# Patient Record
Sex: MALE | Race: WHITE | HISPANIC OR LATINO | URBAN - METROPOLITAN AREA
[De-identification: names, ages, dates, MRNs, and addresses within clinical notes are randomized per-mention and may not be internally consistent; named-entity substitution may affect disease eponyms.]

---

## 2019-07-18 ENCOUNTER — INPATIENT (INPATIENT)
Facility: HOSPITAL | Age: 64
LOS: 4 days | Discharge: HOME | DRG: 897 | End: 2019-07-23
Attending: FAMILY MEDICINE | Admitting: FAMILY MEDICINE
Payer: MEDICAID

## 2019-07-18 VITALS
WEIGHT: 145.06 LBS | HEART RATE: 107 BPM | OXYGEN SATURATION: 96 % | HEIGHT: 60 IN | SYSTOLIC BLOOD PRESSURE: 145 MMHG | RESPIRATION RATE: 16 BRPM | TEMPERATURE: 98 F | DIASTOLIC BLOOD PRESSURE: 87 MMHG

## 2019-07-18 DIAGNOSIS — K29.20 ALCOHOLIC GASTRITIS WITHOUT BLEEDING: ICD-10-CM

## 2019-07-18 DIAGNOSIS — F10.10 ALCOHOL ABUSE, UNCOMPLICATED: ICD-10-CM

## 2019-07-18 DIAGNOSIS — R10.84 GENERALIZED ABDOMINAL PAIN: ICD-10-CM

## 2019-07-18 DIAGNOSIS — E11.9 TYPE 2 DIABETES MELLITUS WITHOUT COMPLICATIONS: ICD-10-CM

## 2019-07-18 DIAGNOSIS — I10 ESSENTIAL (PRIMARY) HYPERTENSION: ICD-10-CM

## 2019-07-18 DIAGNOSIS — Z29.9 ENCOUNTER FOR PROPHYLACTIC MEASURES, UNSPECIFIED: ICD-10-CM

## 2019-07-18 LAB
ALBUMIN SERPL ELPH-MCNC: 4.2 G/DL — SIGNIFICANT CHANGE UP (ref 3.5–5)
ALP SERPL-CCNC: 113 U/L — SIGNIFICANT CHANGE UP (ref 40–120)
ALT FLD-CCNC: 146 U/L DA — HIGH (ref 10–60)
ANION GAP SERPL CALC-SCNC: 20 MMOL/L — HIGH (ref 5–17)
AST SERPL-CCNC: 232 U/L — HIGH (ref 10–40)
BASE EXCESS BLDA CALC-SCNC: -6.3 MMOL/L — LOW (ref -2–2)
BASOPHILS # BLD AUTO: 0.02 K/UL — SIGNIFICANT CHANGE UP (ref 0–0.2)
BASOPHILS NFR BLD AUTO: 0.4 % — SIGNIFICANT CHANGE UP (ref 0–2)
BILIRUB SERPL-MCNC: 1.6 MG/DL — HIGH (ref 0.2–1.2)
BLOOD GAS COMMENTS ARTERIAL: SIGNIFICANT CHANGE UP
BUN SERPL-MCNC: 17 MG/DL — SIGNIFICANT CHANGE UP (ref 7–18)
CALCIUM SERPL-MCNC: 8.6 MG/DL — SIGNIFICANT CHANGE UP (ref 8.4–10.5)
CHLORIDE SERPL-SCNC: 96 MMOL/L — SIGNIFICANT CHANGE UP (ref 96–108)
CO2 SERPL-SCNC: 17 MMOL/L — LOW (ref 22–31)
CREAT SERPL-MCNC: 0.96 MG/DL — SIGNIFICANT CHANGE UP (ref 0.5–1.3)
EOSINOPHIL # BLD AUTO: 0.06 K/UL — SIGNIFICANT CHANGE UP (ref 0–0.5)
EOSINOPHIL NFR BLD AUTO: 1.1 % — SIGNIFICANT CHANGE UP (ref 0–6)
ETHANOL SERPL-MCNC: 204 MG/DL — HIGH (ref 0–10)
GLUCOSE SERPL-MCNC: 168 MG/DL — HIGH (ref 70–99)
HCO3 BLDA-SCNC: 18 MMOL/L — LOW (ref 23–27)
HCT VFR BLD CALC: 42.5 % — SIGNIFICANT CHANGE UP (ref 39–50)
HGB BLD-MCNC: 15.1 G/DL — SIGNIFICANT CHANGE UP (ref 13–17)
HOROWITZ INDEX BLDA+IHG-RTO: 21 — SIGNIFICANT CHANGE UP
IMM GRANULOCYTES NFR BLD AUTO: 0.2 % — SIGNIFICANT CHANGE UP (ref 0–1.5)
LIDOCAIN IGE QN: 227 U/L — SIGNIFICANT CHANGE UP (ref 73–393)
LYMPHOCYTES # BLD AUTO: 2.72 K/UL — SIGNIFICANT CHANGE UP (ref 1–3.3)
LYMPHOCYTES # BLD AUTO: 51 % — HIGH (ref 13–44)
MCHC RBC-ENTMCNC: 35 PG — HIGH (ref 27–34)
MCHC RBC-ENTMCNC: 35.5 GM/DL — SIGNIFICANT CHANGE UP (ref 32–36)
MCV RBC AUTO: 98.4 FL — SIGNIFICANT CHANGE UP (ref 80–100)
MONOCYTES # BLD AUTO: 0.29 K/UL — SIGNIFICANT CHANGE UP (ref 0–0.9)
MONOCYTES NFR BLD AUTO: 5.4 % — SIGNIFICANT CHANGE UP (ref 2–14)
NEUTROPHILS # BLD AUTO: 2.23 K/UL — SIGNIFICANT CHANGE UP (ref 1.8–7.4)
NEUTROPHILS NFR BLD AUTO: 41.9 % — LOW (ref 43–77)
NRBC # BLD: 0 /100 WBCS — SIGNIFICANT CHANGE UP (ref 0–0)
PCO2 BLDA: 32 MMHG — SIGNIFICANT CHANGE UP (ref 32–46)
PH BLDA: 7.36 — SIGNIFICANT CHANGE UP (ref 7.35–7.45)
PLATELET # BLD AUTO: 168 K/UL — SIGNIFICANT CHANGE UP (ref 150–400)
PO2 BLDA: 85 MMHG — SIGNIFICANT CHANGE UP (ref 74–108)
POTASSIUM SERPL-MCNC: 3.8 MMOL/L — SIGNIFICANT CHANGE UP (ref 3.5–5.3)
POTASSIUM SERPL-SCNC: 3.8 MMOL/L — SIGNIFICANT CHANGE UP (ref 3.5–5.3)
PROT SERPL-MCNC: 9.3 G/DL — HIGH (ref 6–8.3)
RBC # BLD: 4.32 M/UL — SIGNIFICANT CHANGE UP (ref 4.2–5.8)
RBC # FLD: 12.8 % — SIGNIFICANT CHANGE UP (ref 10.3–14.5)
SAO2 % BLDA: 95 % — SIGNIFICANT CHANGE UP (ref 92–96)
SODIUM SERPL-SCNC: 133 MMOL/L — LOW (ref 135–145)
WBC # BLD: 5.33 K/UL — SIGNIFICANT CHANGE UP (ref 3.8–10.5)
WBC # FLD AUTO: 5.33 K/UL — SIGNIFICANT CHANGE UP (ref 3.8–10.5)

## 2019-07-18 PROCEDURE — 76700 US EXAM ABDOM COMPLETE: CPT | Mod: 26

## 2019-07-18 PROCEDURE — 74177 CT ABD & PELVIS W/CONTRAST: CPT | Mod: 26

## 2019-07-18 PROCEDURE — 99285 EMERGENCY DEPT VISIT HI MDM: CPT

## 2019-07-18 RX ORDER — INSULIN LISPRO 100/ML
VIAL (ML) SUBCUTANEOUS EVERY 6 HOURS
Refills: 0 | Status: DISCONTINUED | OUTPATIENT
Start: 2019-07-18 | End: 2019-07-22

## 2019-07-18 RX ORDER — SODIUM CHLORIDE 9 MG/ML
1000 INJECTION, SOLUTION INTRAVENOUS
Refills: 0 | Status: DISCONTINUED | OUTPATIENT
Start: 2019-07-18 | End: 2019-07-22

## 2019-07-18 RX ORDER — SODIUM CHLORIDE 9 MG/ML
1000 INJECTION, SOLUTION INTRAVENOUS
Refills: 0 | Status: DISCONTINUED | OUTPATIENT
Start: 2019-07-18 | End: 2019-07-18

## 2019-07-18 RX ORDER — SODIUM CHLORIDE 9 MG/ML
1000 INJECTION INTRAMUSCULAR; INTRAVENOUS; SUBCUTANEOUS ONCE
Refills: 0 | Status: COMPLETED | OUTPATIENT
Start: 2019-07-18 | End: 2019-07-18

## 2019-07-18 RX ORDER — ONDANSETRON 8 MG/1
4 TABLET, FILM COATED ORAL ONCE
Refills: 0 | Status: COMPLETED | OUTPATIENT
Start: 2019-07-18 | End: 2019-07-18

## 2019-07-18 RX ORDER — THIAMINE MONONITRATE (VIT B1) 100 MG
500 TABLET ORAL DAILY
Refills: 0 | Status: COMPLETED | OUTPATIENT
Start: 2019-07-18 | End: 2019-07-21

## 2019-07-18 RX ORDER — FAMOTIDINE 10 MG/ML
20 INJECTION INTRAVENOUS ONCE
Refills: 0 | Status: COMPLETED | OUTPATIENT
Start: 2019-07-18 | End: 2019-07-18

## 2019-07-18 RX ORDER — METOCLOPRAMIDE HCL 10 MG
10 TABLET ORAL ONCE
Refills: 0 | Status: COMPLETED | OUTPATIENT
Start: 2019-07-18 | End: 2019-07-18

## 2019-07-18 RX ORDER — THIAMINE MONONITRATE (VIT B1) 100 MG
100 TABLET ORAL ONCE
Refills: 0 | Status: COMPLETED | OUTPATIENT
Start: 2019-07-18 | End: 2019-07-18

## 2019-07-18 RX ORDER — ENOXAPARIN SODIUM 100 MG/ML
40 INJECTION SUBCUTANEOUS DAILY
Refills: 0 | Status: DISCONTINUED | OUTPATIENT
Start: 2019-07-18 | End: 2019-07-23

## 2019-07-18 RX ORDER — SODIUM CHLORIDE 9 MG/ML
1000 INJECTION, SOLUTION INTRAVENOUS
Refills: 0 | Status: DISCONTINUED | OUTPATIENT
Start: 2019-07-18 | End: 2019-07-23

## 2019-07-18 RX ADMIN — FAMOTIDINE 20 MILLIGRAM(S): 10 INJECTION INTRAVENOUS at 13:34

## 2019-07-18 RX ADMIN — Medication 100 MILLIGRAM(S): at 13:34

## 2019-07-18 RX ADMIN — SODIUM CHLORIDE 150 MILLILITER(S): 9 INJECTION, SOLUTION INTRAVENOUS at 20:05

## 2019-07-18 RX ADMIN — SODIUM CHLORIDE 1000 MILLILITER(S): 9 INJECTION INTRAMUSCULAR; INTRAVENOUS; SUBCUTANEOUS at 14:19

## 2019-07-18 RX ADMIN — ONDANSETRON 4 MILLIGRAM(S): 8 TABLET, FILM COATED ORAL at 13:34

## 2019-07-18 RX ADMIN — Medication 1 MILLIGRAM(S): at 15:30

## 2019-07-18 RX ADMIN — SODIUM CHLORIDE 1000 MILLILITER(S): 9 INJECTION INTRAMUSCULAR; INTRAVENOUS; SUBCUTANEOUS at 13:33

## 2019-07-18 RX ADMIN — Medication 30 MILLILITER(S): at 13:34

## 2019-07-18 RX ADMIN — Medication 10 MILLIGRAM(S): at 15:30

## 2019-07-18 NOTE — ED PROVIDER NOTE - PROGRESS NOTE DETAILS
patient persistently vomiting, will admit for symptom control and hydration, likely alcoholic ketoacidosis.

## 2019-07-18 NOTE — ED PROVIDER NOTE - OBJECTIVE STATEMENT
64 y/o M with a significant PMHx of ETOH dependence presents to the ED with complaints of epigastric/LUQ pain and nausea. Patient states he has been drinking every day and night for the past 15 days; last drink prior to arrival. Denies fever, chills or any other acute complaints.

## 2019-07-18 NOTE — ED PROVIDER NOTE - CARE PLAN
Principal Discharge DX:	Acute alcoholic gastritis, presence of bleeding unspecified  Secondary Diagnosis:	Vomiting  Secondary Diagnosis:	Alcoholic ketoacidosis

## 2019-07-18 NOTE — ED ADULT TRIAGE NOTE - CHIEF COMPLAINT QUOTE
c/o ETOH " I HAVE BEEN DRINKING LIQUEUR FOR THE PASSED 15 DAYS DAY AND NIGHT AND I AM DIABETIC. I HAVE NO MORE MONEY FOR ALCOHOL." LAST DRINK ONE HOUR AGO

## 2019-07-18 NOTE — ED ADULT NURSE NOTE - OBJECTIVE STATEMENT
pt a&ox3, here with c/o ETOH intox. pt states he's been drinking for the past 2 weeks, last drink was today. states he doesn't have any money left. c/o nausea, minor headache. ambulatory in ED.

## 2019-07-18 NOTE — H&P ADULT - HISTORY OF PRESENT ILLNESS
63 Y old Male Lithuanian speaking with Significant alcohol intoxication presented with  abdominal pain, 4/10, non radiating. He felt like throwing up. He is binge drinker. Drinks 10 - 15 glasses in a day. He has been drinking Alcohol for many years,he was still in intoxicated state wasnt able to provide much history. Primary team to follow. 63 Y old Male Albanian speaking with Significant alcohol intoxication presented with  abdominal pain, 4/10, non radiating. He felt like throwing up. He is binge drinker. Drinks 10 - 15 glasses in a day. He has been drinking Alcohol for many years,he was still in intoxicated state wasnt able to provide much history. Primary team to follow.    Ed course Vit [id number]

## 2019-07-18 NOTE — ED ADULT NURSE NOTE - ED STAT RN HANDOFF DETAILS
endorsed pt to YANELI Saldaña in G1 in stable condition for continuation of care. pt a&ox3, admitted for alcoholic gastritis. 20G right hand. in yellow gown, safety maintained.

## 2019-07-18 NOTE — H&P ADULT - PROBLEM SELECTOR PLAN 1
Patient is intoxicated.  -Banana bag  -Ativan q6.  U/S abdomen  -amylase  -lipase  -CIWA protocol.  - consult  -gianfranco consult. Patient is intoxicated.  -multivitamin/thiamin/folic acid in 0/9 % Nacl. ( banana bag) ordered once stat. (Primary team to reordeer if needed.  -Ativan 1 mg q4  U/S abdomen  -amylase  -lipase  -CIWA protocol.  - consult  -pysch consult.

## 2019-07-18 NOTE — ED PROVIDER NOTE - CLINICAL SUMMARY MEDICAL DECISION MAKING FREE TEXT BOX
64 y/o M presents with epigastric/LUQ pain. Will give symptomatic control, obtain labs including lipase, hydrate and assess for sobriety.

## 2019-07-18 NOTE — H&P ADULT - PROBLEM SELECTOR PLAN 3
unabble to get history about diabetes. he said he had high blood sugars.  F/U HbA1c, acccu checks.  on HSS

## 2019-07-18 NOTE — ED ADULT NURSE NOTE - NSIMPLEMENTINTERV_GEN_ALL_ED
Implemented All Fall Risk Interventions:  [place] to call system. Call bell, personal items and telephone within reach. Instruct patient to call for assistance. Room bathroom lighting operational. Non-slip footwear when patient is off stretcher. Physically safe environment: no spills, clutter or unnecessary equipment. Stretcher in lowest position, wheels locked, appropriate side rails in place. Provide visual cue, wrist band, yellow gown, etc. Monitor gait and stability. Monitor for mental status changes and reorient to person, place, and time. Review medications for side effects contributing to fall risk. Reinforce activity limits and safety measures with patient and family.

## 2019-07-18 NOTE — H&P ADULT - ATTENDING COMMENTS
Patient seen and examined. Case fully discussed with  ER attending and medical resident. Reviewed chief complaint. Reviewed review of systems. Reviewed list of medications.  Reviewed physical exam.  Reviewed assessment and plan. agree with full H and P. Will follow up clinically. Will follow up with psychiatry. DT precautions.

## 2019-07-19 DIAGNOSIS — K29.20 ALCOHOLIC GASTRITIS WITHOUT BLEEDING: ICD-10-CM

## 2019-07-19 DIAGNOSIS — R11.0 NAUSEA: ICD-10-CM

## 2019-07-19 DIAGNOSIS — F10.20 ALCOHOL DEPENDENCE, UNCOMPLICATED: ICD-10-CM

## 2019-07-19 LAB
24R-OH-CALCIDIOL SERPL-MCNC: 14 NG/ML — LOW (ref 30–80)
ALBUMIN SERPL ELPH-MCNC: 3.4 G/DL — LOW (ref 3.5–5)
ALBUMIN SERPL ELPH-MCNC: 3.5 G/DL — SIGNIFICANT CHANGE UP (ref 3.5–5)
ALP SERPL-CCNC: 92 U/L — SIGNIFICANT CHANGE UP (ref 40–120)
ALP SERPL-CCNC: 93 U/L — SIGNIFICANT CHANGE UP (ref 40–120)
ALT FLD-CCNC: 110 U/L DA — HIGH (ref 10–60)
ALT FLD-CCNC: 111 U/L DA — HIGH (ref 10–60)
AMYLASE P1 CFR SERPL: 50 U/L — SIGNIFICANT CHANGE UP (ref 25–115)
ANION GAP SERPL CALC-SCNC: 17 MMOL/L — SIGNIFICANT CHANGE UP (ref 5–17)
APAP SERPL-MCNC: <10 UG/ML — SIGNIFICANT CHANGE UP (ref 10–30)
AST SERPL-CCNC: 151 U/L — HIGH (ref 10–40)
AST SERPL-CCNC: 152 U/L — HIGH (ref 10–40)
BASOPHILS # BLD AUTO: 0.03 K/UL — SIGNIFICANT CHANGE UP (ref 0–0.2)
BASOPHILS NFR BLD AUTO: 0.6 % — SIGNIFICANT CHANGE UP (ref 0–2)
BILIRUB DIRECT SERPL-MCNC: 0.4 MG/DL — HIGH (ref 0–0.2)
BILIRUB INDIRECT FLD-MCNC: 1.2 MG/DL — HIGH (ref 0.2–1)
BILIRUB SERPL-MCNC: 1.6 MG/DL — HIGH (ref 0.2–1.2)
BILIRUB SERPL-MCNC: 1.6 MG/DL — HIGH (ref 0.2–1.2)
BUN SERPL-MCNC: 14 MG/DL — SIGNIFICANT CHANGE UP (ref 7–18)
CALCIUM SERPL-MCNC: 7.7 MG/DL — LOW (ref 8.4–10.5)
CHLORIDE SERPL-SCNC: 101 MMOL/L — SIGNIFICANT CHANGE UP (ref 96–108)
CHOLEST SERPL-MCNC: 233 MG/DL — HIGH (ref 10–199)
CO2 SERPL-SCNC: 18 MMOL/L — LOW (ref 22–31)
CREAT SERPL-MCNC: 0.77 MG/DL — SIGNIFICANT CHANGE UP (ref 0.5–1.3)
EOSINOPHIL # BLD AUTO: 0.09 K/UL — SIGNIFICANT CHANGE UP (ref 0–0.5)
EOSINOPHIL NFR BLD AUTO: 1.7 % — SIGNIFICANT CHANGE UP (ref 0–6)
FOLATE SERPL-MCNC: >20 NG/ML — SIGNIFICANT CHANGE UP
GLUCOSE BLDC GLUCOMTR-MCNC: 147 MG/DL — HIGH (ref 70–99)
GLUCOSE BLDC GLUCOMTR-MCNC: 163 MG/DL — HIGH (ref 70–99)
GLUCOSE BLDC GLUCOMTR-MCNC: 165 MG/DL — HIGH (ref 70–99)
GLUCOSE BLDC GLUCOMTR-MCNC: 166 MG/DL — HIGH (ref 70–99)
GLUCOSE BLDC GLUCOMTR-MCNC: 193 MG/DL — HIGH (ref 70–99)
GLUCOSE SERPL-MCNC: 155 MG/DL — HIGH (ref 70–99)
HAV IGM SER-ACNC: SIGNIFICANT CHANGE UP
HBA1C BLD-MCNC: 9.9 % — HIGH (ref 4–5.6)
HBV CORE IGM SER-ACNC: SIGNIFICANT CHANGE UP
HBV SURFACE AG SER-ACNC: SIGNIFICANT CHANGE UP
HCT VFR BLD CALC: 37.1 % — LOW (ref 39–50)
HCV AB S/CO SERPL IA: 0.17 S/CO — SIGNIFICANT CHANGE UP (ref 0–0.99)
HCV AB SERPL-IMP: SIGNIFICANT CHANGE UP
HDLC SERPL-MCNC: 59 MG/DL — SIGNIFICANT CHANGE UP
HGB BLD-MCNC: 12.8 G/DL — LOW (ref 13–17)
IMM GRANULOCYTES NFR BLD AUTO: 0.2 % — SIGNIFICANT CHANGE UP (ref 0–1.5)
LACTATE SERPL-SCNC: 1 MMOL/L — SIGNIFICANT CHANGE UP (ref 0.7–2)
LIDOCAIN IGE QN: 154 U/L — SIGNIFICANT CHANGE UP (ref 73–393)
LIPID PNL WITH DIRECT LDL SERPL: 147 MG/DL — SIGNIFICANT CHANGE UP
LYMPHOCYTES # BLD AUTO: 1.6 K/UL — SIGNIFICANT CHANGE UP (ref 1–3.3)
LYMPHOCYTES # BLD AUTO: 30.5 % — SIGNIFICANT CHANGE UP (ref 13–44)
MAGNESIUM SERPL-MCNC: 2 MG/DL — SIGNIFICANT CHANGE UP (ref 1.6–2.6)
MCHC RBC-ENTMCNC: 34.2 PG — HIGH (ref 27–34)
MCHC RBC-ENTMCNC: 34.5 GM/DL — SIGNIFICANT CHANGE UP (ref 32–36)
MCV RBC AUTO: 99.2 FL — SIGNIFICANT CHANGE UP (ref 80–100)
MONOCYTES # BLD AUTO: 0.33 K/UL — SIGNIFICANT CHANGE UP (ref 0–0.9)
MONOCYTES NFR BLD AUTO: 6.3 % — SIGNIFICANT CHANGE UP (ref 2–14)
NEUTROPHILS # BLD AUTO: 3.18 K/UL — SIGNIFICANT CHANGE UP (ref 1.8–7.4)
NEUTROPHILS NFR BLD AUTO: 60.7 % — SIGNIFICANT CHANGE UP (ref 43–77)
NRBC # BLD: 0 /100 WBCS — SIGNIFICANT CHANGE UP (ref 0–0)
PHOSPHATE SERPL-MCNC: 2.4 MG/DL — LOW (ref 2.5–4.5)
PLATELET # BLD AUTO: 139 K/UL — LOW (ref 150–400)
POTASSIUM SERPL-MCNC: 3.6 MMOL/L — SIGNIFICANT CHANGE UP (ref 3.5–5.3)
POTASSIUM SERPL-SCNC: 3.6 MMOL/L — SIGNIFICANT CHANGE UP (ref 3.5–5.3)
PROT SERPL-MCNC: 7.5 G/DL — SIGNIFICANT CHANGE UP (ref 6–8.3)
PROT SERPL-MCNC: 7.6 G/DL — SIGNIFICANT CHANGE UP (ref 6–8.3)
RBC # BLD: 3.74 M/UL — LOW (ref 4.2–5.8)
RBC # FLD: 13.2 % — SIGNIFICANT CHANGE UP (ref 10.3–14.5)
SODIUM SERPL-SCNC: 136 MMOL/L — SIGNIFICANT CHANGE UP (ref 135–145)
TOTAL CHOLESTEROL/HDL RATIO MEASUREMENT: 3.9 RATIO — SIGNIFICANT CHANGE UP (ref 3.4–9.6)
TRIGL SERPL-MCNC: 137 MG/DL — SIGNIFICANT CHANGE UP (ref 10–149)
TSH SERPL-MCNC: 3.29 UU/ML — SIGNIFICANT CHANGE UP (ref 0.34–4.82)
VIT B12 SERPL-MCNC: 916 PG/ML — SIGNIFICANT CHANGE UP (ref 232–1245)
WBC # BLD: 5.24 K/UL — SIGNIFICANT CHANGE UP (ref 3.8–10.5)
WBC # FLD AUTO: 5.24 K/UL — SIGNIFICANT CHANGE UP (ref 3.8–10.5)

## 2019-07-19 PROCEDURE — 76705 ECHO EXAM OF ABDOMEN: CPT | Mod: 26

## 2019-07-19 RX ORDER — LANOLIN ALCOHOL/MO/W.PET/CERES
5 CREAM (GRAM) TOPICAL AT BEDTIME
Refills: 0 | Status: DISCONTINUED | OUTPATIENT
Start: 2019-07-19 | End: 2019-07-20

## 2019-07-19 RX ORDER — POTASSIUM PHOSPHATE, MONOBASIC POTASSIUM PHOSPHATE, DIBASIC 236; 224 MG/ML; MG/ML
15 INJECTION, SOLUTION INTRAVENOUS ONCE
Refills: 0 | Status: COMPLETED | OUTPATIENT
Start: 2019-07-19 | End: 2019-07-19

## 2019-07-19 RX ORDER — ONDANSETRON 8 MG/1
4 TABLET, FILM COATED ORAL ONCE
Refills: 0 | Status: COMPLETED | OUTPATIENT
Start: 2019-07-19 | End: 2019-07-19

## 2019-07-19 RX ORDER — ONDANSETRON 8 MG/1
4 TABLET, FILM COATED ORAL EVERY 6 HOURS
Refills: 0 | Status: DISCONTINUED | OUTPATIENT
Start: 2019-07-19 | End: 2019-07-23

## 2019-07-19 RX ORDER — ACETAMINOPHEN 500 MG
650 TABLET ORAL ONCE
Refills: 0 | Status: COMPLETED | OUTPATIENT
Start: 2019-07-19 | End: 2019-07-19

## 2019-07-19 RX ADMIN — Medication 650 MILLIGRAM(S): at 21:53

## 2019-07-19 RX ADMIN — SODIUM CHLORIDE 150 MILLILITER(S): 9 INJECTION, SOLUTION INTRAVENOUS at 13:35

## 2019-07-19 RX ADMIN — Medication 650 MILLIGRAM(S): at 23:19

## 2019-07-19 RX ADMIN — ENOXAPARIN SODIUM 40 MILLIGRAM(S): 100 INJECTION SUBCUTANEOUS at 13:26

## 2019-07-19 RX ADMIN — Medication 1: at 17:20

## 2019-07-19 RX ADMIN — Medication 105 MILLIGRAM(S): at 13:26

## 2019-07-19 RX ADMIN — SODIUM CHLORIDE 100 MILLILITER(S): 9 INJECTION, SOLUTION INTRAVENOUS at 00:58

## 2019-07-19 RX ADMIN — ONDANSETRON 4 MILLIGRAM(S): 8 TABLET, FILM COATED ORAL at 21:53

## 2019-07-19 RX ADMIN — POTASSIUM PHOSPHATE, MONOBASIC POTASSIUM PHOSPHATE, DIBASIC 62.5 MILLIMOLE(S): 236; 224 INJECTION, SOLUTION INTRAVENOUS at 15:33

## 2019-07-19 RX ADMIN — ONDANSETRON 4 MILLIGRAM(S): 8 TABLET, FILM COATED ORAL at 13:49

## 2019-07-19 NOTE — PROGRESS NOTE ADULT - ASSESSMENT
63 Y old Male Indonesian speaking with Significant alcohol intoxication presented with  abdominal pain, 4/10, non radiating. He felt like throwing up. He is binge drinker. Drinks 10 - 15 glasses in a day. He has been drinking Alcohol for many years, he was still in intoxicated state wasn't able to provide much history.

## 2019-07-19 NOTE — CHART NOTE - NSCHARTNOTEFT_GEN_A_CORE
EVENT:     OBJECTIVE:  Vital Signs Last 24 Hrs  T(C): 36.6 (19 Jul 2019 13:19), Max: 36.8 (19 Jul 2019 05:37)  T(F): 97.8 (19 Jul 2019 13:19), Max: 98.3 (19 Jul 2019 05:37)  HR: 85 (19 Jul 2019 13:19) (84 - 89)  BP: 136/66 (19 Jul 2019 13:19) (113/51 - 136/66)  BP(mean): --  RR: 17 (19 Jul 2019 13:19) (17 - 18)  SpO2: 98% (19 Jul 2019 13:19) (95% - 98%)    FOCUSED PHYSICAL EXAM:    LABS:                        12.8   5.24  )-----------( 139      ( 19 Jul 2019 05:56 )             37.1     07-19    136  |  101  |  14  ----------------------------<  155<H>  3.6   |  18<L>  |  0.77    Ca    7.7<L>      19 Jul 2019 05:56  Phos  2.4     07-19  Mg     2.0     07-19    TPro  7.6  /  Alb  3.5  /  TBili  1.6<H>  /  DBili  0.4<H>  /  AST  151<H>  /  ALT  111<H>  /  AlkPhos  93  07-19      EKG:   IMGAGING:    ASSESSMENT:  HPI:  63 Y old Male Serbian speaking with Significant alcohol intoxication presented with  abdominal pain, 4/10, non radiating. He felt like throwing up. He is binge drinker. Drinks 10 - 15 glasses in a day. He has been drinking Alcohol for many years,he was still in intoxicated state wasnt able to provide much history. Primary team to follow.    Ed course Vit [id number] (18 Jul 2019 20:24)      PLAN:     FOLLOW UP / RESULT: EVENT: c/o headache, epigastric pain and nausea. Requesting medicine to sleep also.    OBJECTIVE:  Vital Signs Last 24 Hrs  T(C): 36.6 (19 Jul 2019 13:19), Max: 36.8 (19 Jul 2019 05:37)  T(F): 97.8 (19 Jul 2019 13:19), Max: 98.3 (19 Jul 2019 05:37)  HR: 85 (19 Jul 2019 13:19) (84 - 89)  BP: 136/66 (19 Jul 2019 13:19) (113/51 - 136/66)  BP(mean): --  RR: 17 (19 Jul 2019 13:19) (17 - 18)  SpO2: 98% (19 Jul 2019 13:19) (95% - 98%)    FOCUSED PHYSICAL EXAM:  CHEST/LUNG: Clear to auscultation bilaterally; No wheezes or rales  HEART: Regular rate and rhythm; No murmurs, rubs, or gallops  ABDOMEN: Soft, epigastric pain, Nondistended, Normal bowel sounds  EXTREMITIES:  2+ Peripheral Pulses, No clubbing, cyanosis, or edema  Neurological: AOx3  Skin: Warm and dry    LABS:                        12.8   5.24  )-----------( 139      ( 19 Jul 2019 05:56 )             37.1     07-19    136  |  101  |  14  ----------------------------<  155<H>  3.6   |  18<L>  |  0.77    Ca    7.7<L>      19 Jul 2019 05:56  Phos  2.4     07-19  Mg     2.0     07-19    TPro  7.6  /  Alb  3.5  /  TBili  1.6<H>  /  DBili  0.4<H>  /  AST  151<H>  /  ALT  111<H>  /  AlkPhos  93  07-19    ASSESSMENT:  HPI:  63 Y old Male Sinhala speaking with Significant alcohol intoxication presented with  abdominal pain, 4/10, non radiating. He felt like throwing up. He is binge drinker. Drinks 10 - 15 glasses in a day. He has been drinking Alcohol for many years, he was still in intoxicated state wasnt able to provide much history. Primary team to follow.  Ed course Vit [id number] (18 Jul 2019 20:24)    PLAN:     MEDICATIONS  (STANDING):  dextrose 5% + sodium chloride 0.45%. 1000 milliLiter(s) (150 mL/Hr) IV Continuous <Continuous>  enoxaparin Injectable 40 milliGRAM(s) SubCutaneous daily  insulin lispro (HumaLOG) corrective regimen sliding scale   SubCutaneous every 6 hours  melatonin 5 milliGRAM(s) Oral at bedtime  multivitamin/thiamine/folic acid in sodium chloride 0.9% 1000 milliLiter(s) (100 mL/Hr) IV Continuous <Continuous>  thiamine IVPB 500 milliGRAM(s) IV Intermittent daily    MEDICATIONS  (PRN):  LORazepam   Injectable 1 milliGRAM(s) IV Push every 4 hours PRN CIWA-Ar score increase by 2 points and a total score of 7 or less  LORazepam   Injectable 1 milliGRAM(s) IntraMuscular four times a day PRN Agitation EVENT: c/o headache, epigastric pain and nausea. Requesting medicine to sleep also.    OBJECTIVE:  Vital Signs Last 24 Hrs  T(C): 36.6 (19 Jul 2019 13:19), Max: 36.8 (19 Jul 2019 05:37)  T(F): 97.8 (19 Jul 2019 13:19), Max: 98.3 (19 Jul 2019 05:37)  HR: 85 (19 Jul 2019 13:19) (84 - 89)  BP: 136/66 (19 Jul 2019 13:19) (113/51 - 136/66)  BP(mean): --  RR: 17 (19 Jul 2019 13:19) (17 - 18)  SpO2: 98% (19 Jul 2019 13:19) (95% - 98%)    FOCUSED PHYSICAL EXAM:  CHEST/LUNG: Clear to auscultation bilaterally; No wheezes or rales  HEART: Regular rate and rhythm; No murmurs, rubs, or gallops  ABDOMEN: Soft, epigastric pain, Nondistended, Normal bowel sounds  EXTREMITIES:  2+ Peripheral Pulses, No clubbing, cyanosis, or edema  Neurological: AOx3  Skin: Warm and dry    LABS:                        12.8   5.24  )-----------( 139      ( 19 Jul 2019 05:56 )             37.1     07-19    136  |  101  |  14  ----------------------------<  155<H>  3.6   |  18<L>  |  0.77    Ca    7.7<L>      19 Jul 2019 05:56  Phos  2.4     07-19  Mg     2.0     07-19    TPro  7.6  /  Alb  3.5  /  TBili  1.6<H>  /  DBili  0.4<H>  /  AST  151<H>  /  ALT  111<H>  /  AlkPhos  93  07-19    ASSESSMENT:  HPI:  63 Y old Male Kyrgyz speaking with Significant alcohol intoxication presented with  abdominal pain, 4/10, non radiating. He felt like throwing up. He is binge drinker. Drinks 10 - 15 glasses in a day. He has been drinking Alcohol for many years, he was still in intoxicated state wasnt able to provide much history. Primary team to follow.  Ed course Vit [id number] (18 Jul 2019 20:24)    PLAN:   MEDICATIONS  (PRN):  1. Ondansetron Injectable 4 milliGRAM(s) IV Push every 6 hours PRN Nausea and/or Vomiting ordered  2. Melatonin 5 milliGRAM(s) Oral at bedtime ordered EVENT: c/o headache, epigastric pain and nausea. Requesting medicine to sleep also.    OBJECTIVE:  Vital Signs Last 24 Hrs  T(C): 36.6 (19 Jul 2019 13:19), Max: 36.8 (19 Jul 2019 05:37)  T(F): 97.8 (19 Jul 2019 13:19), Max: 98.3 (19 Jul 2019 05:37)  HR: 85 (19 Jul 2019 13:19) (84 - 89)  BP: 136/66 (19 Jul 2019 13:19) (113/51 - 136/66)  BP(mean): --  RR: 17 (19 Jul 2019 13:19) (17 - 18)  SpO2: 98% (19 Jul 2019 13:19) (95% - 98%)    FOCUSED PHYSICAL EXAM:  CHEST/LUNG: Clear to auscultation bilaterally; No wheezes or rales  HEART: Regular rate and rhythm; No murmurs, rubs, or gallops  ABDOMEN: Soft, epigastric pain, Nondistended, Normal bowel sounds  EXTREMITIES:  2+ Peripheral Pulses, No clubbing, cyanosis, or edema  Neurological: AOx3  Skin: Warm and dry    LABS:                        12.8   5.24  )-----------( 139      ( 19 Jul 2019 05:56 )             37.1     07-19    136  |  101  |  14  ----------------------------<  155<H>  3.6   |  18<L>  |  0.77    Ca    7.7<L>      19 Jul 2019 05:56  Phos  2.4     07-19  Mg     2.0     07-19    TPro  7.6  /  Alb  3.5  /  TBili  1.6<H>  /  DBili  0.4<H>  /  AST  151<H>  /  ALT  111<H>  /  AlkPhos  93  07-19    ASSESSMENT:  HPI:  63 Y old Male Bengali speaking with Significant alcohol intoxication presented with  abdominal pain, 4/10, non radiating. He felt like throwing up. He is binge drinker. Drinks 10 - 15 glasses in a day. He has been drinking Alcohol for many years, he was still in intoxicated state wasnt able to provide much history. Primary team to follow.  Ed course Vit [id number] (18 Jul 2019 20:24)    PLAN:   MEDICATIONS  (PRN):  1. Ondansetron Injectable 4 milliGRAM(s) IV Push every 6 hours PRN Nausea and/or Vomiting ordered  2. Melatonin 5 milliGRAM(s) Oral at bedtime ordered  3. Tylenol 650 mg X 1 ordered

## 2019-07-20 LAB
ALBUMIN SERPL ELPH-MCNC: 3.1 G/DL — LOW (ref 3.5–5)
ALP SERPL-CCNC: 89 U/L — SIGNIFICANT CHANGE UP (ref 40–120)
ALT FLD-CCNC: 113 U/L DA — HIGH (ref 10–60)
ANION GAP SERPL CALC-SCNC: 12 MMOL/L — SIGNIFICANT CHANGE UP (ref 5–17)
AST SERPL-CCNC: 163 U/L — HIGH (ref 10–40)
BILIRUB SERPL-MCNC: 1.4 MG/DL — HIGH (ref 0.2–1.2)
BUN SERPL-MCNC: 7 MG/DL — SIGNIFICANT CHANGE UP (ref 7–18)
CALCIUM SERPL-MCNC: 7.9 MG/DL — LOW (ref 8.4–10.5)
CHLORIDE SERPL-SCNC: 100 MMOL/L — SIGNIFICANT CHANGE UP (ref 96–108)
CO2 SERPL-SCNC: 22 MMOL/L — SIGNIFICANT CHANGE UP (ref 22–31)
CREAT SERPL-MCNC: 0.71 MG/DL — SIGNIFICANT CHANGE UP (ref 0.5–1.3)
GLUCOSE BLDC GLUCOMTR-MCNC: 161 MG/DL — HIGH (ref 70–99)
GLUCOSE BLDC GLUCOMTR-MCNC: 202 MG/DL — HIGH (ref 70–99)
GLUCOSE BLDC GLUCOMTR-MCNC: 224 MG/DL — HIGH (ref 70–99)
GLUCOSE BLDC GLUCOMTR-MCNC: 241 MG/DL — HIGH (ref 70–99)
GLUCOSE BLDC GLUCOMTR-MCNC: 248 MG/DL — HIGH (ref 70–99)
GLUCOSE BLDC GLUCOMTR-MCNC: 251 MG/DL — HIGH (ref 70–99)
GLUCOSE SERPL-MCNC: 142 MG/DL — HIGH (ref 70–99)
HCT VFR BLD CALC: 35.3 % — LOW (ref 39–50)
HGB BLD-MCNC: 12.4 G/DL — LOW (ref 13–17)
MCHC RBC-ENTMCNC: 34.8 PG — HIGH (ref 27–34)
MCHC RBC-ENTMCNC: 35.1 GM/DL — SIGNIFICANT CHANGE UP (ref 32–36)
MCV RBC AUTO: 99.2 FL — SIGNIFICANT CHANGE UP (ref 80–100)
NRBC # BLD: 0 /100 WBCS — SIGNIFICANT CHANGE UP (ref 0–0)
PHOSPHATE SERPL-MCNC: 2.2 MG/DL — LOW (ref 2.5–4.5)
PLATELET # BLD AUTO: 111 K/UL — LOW (ref 150–400)
POTASSIUM SERPL-MCNC: 3.4 MMOL/L — LOW (ref 3.5–5.3)
POTASSIUM SERPL-SCNC: 3.4 MMOL/L — LOW (ref 3.5–5.3)
PROT SERPL-MCNC: 7.1 G/DL — SIGNIFICANT CHANGE UP (ref 6–8.3)
RBC # BLD: 3.56 M/UL — LOW (ref 4.2–5.8)
RBC # FLD: 12.6 % — SIGNIFICANT CHANGE UP (ref 10.3–14.5)
SODIUM SERPL-SCNC: 134 MMOL/L — LOW (ref 135–145)
WBC # BLD: 3.57 K/UL — LOW (ref 3.8–10.5)
WBC # FLD AUTO: 3.57 K/UL — LOW (ref 3.8–10.5)

## 2019-07-20 RX ORDER — LANOLIN ALCOHOL/MO/W.PET/CERES
5 CREAM (GRAM) TOPICAL AT BEDTIME
Refills: 0 | Status: DISCONTINUED | OUTPATIENT
Start: 2019-07-20 | End: 2019-07-23

## 2019-07-20 RX ORDER — LANOLIN ALCOHOL/MO/W.PET/CERES
5 CREAM (GRAM) TOPICAL ONCE
Refills: 0 | Status: COMPLETED | OUTPATIENT
Start: 2019-07-20 | End: 2019-07-20

## 2019-07-20 RX ADMIN — SODIUM CHLORIDE 100 MILLILITER(S): 9 INJECTION, SOLUTION INTRAVENOUS at 12:24

## 2019-07-20 RX ADMIN — Medication 2: at 17:25

## 2019-07-20 RX ADMIN — Medication 105 MILLIGRAM(S): at 12:23

## 2019-07-20 RX ADMIN — Medication 2: at 00:33

## 2019-07-20 RX ADMIN — Medication 5 MILLIGRAM(S): at 01:02

## 2019-07-20 RX ADMIN — SODIUM CHLORIDE 150 MILLILITER(S): 9 INJECTION, SOLUTION INTRAVENOUS at 16:37

## 2019-07-20 RX ADMIN — Medication 5 MILLIGRAM(S): at 21:32

## 2019-07-20 RX ADMIN — SODIUM CHLORIDE 150 MILLILITER(S): 9 INJECTION, SOLUTION INTRAVENOUS at 23:49

## 2019-07-20 RX ADMIN — SODIUM CHLORIDE 100 MILLILITER(S): 9 INJECTION, SOLUTION INTRAVENOUS at 02:56

## 2019-07-20 RX ADMIN — ONDANSETRON 4 MILLIGRAM(S): 8 TABLET, FILM COATED ORAL at 16:37

## 2019-07-20 RX ADMIN — Medication 3: at 23:44

## 2019-07-20 RX ADMIN — Medication 2: at 12:24

## 2019-07-20 RX ADMIN — ENOXAPARIN SODIUM 40 MILLIGRAM(S): 100 INJECTION SUBCUTANEOUS at 12:23

## 2019-07-20 RX ADMIN — Medication 1: at 06:17

## 2019-07-20 NOTE — PROGRESS NOTE ADULT - ASSESSMENT
63 Y old Male Nepali speaking with Significant alcohol intoxication presented with  abdominal pain, 4/10, non radiating. He felt like throwing up. He is binge drinker. Drinks 10 - 15 glasses in a day. He has been drinking Alcohol for many years, he was still in intoxicated state wasn't able to provide much history.

## 2019-07-21 LAB
ALBUMIN SERPL ELPH-MCNC: 3.2 G/DL — LOW (ref 3.5–5)
ALP SERPL-CCNC: 93 U/L — SIGNIFICANT CHANGE UP (ref 40–120)
ALT FLD-CCNC: 189 U/L DA — HIGH (ref 10–60)
ANION GAP SERPL CALC-SCNC: 8 MMOL/L — SIGNIFICANT CHANGE UP (ref 5–17)
AST SERPL-CCNC: 270 U/L — HIGH (ref 10–40)
BILIRUB SERPL-MCNC: 1.4 MG/DL — HIGH (ref 0.2–1.2)
BUN SERPL-MCNC: 4 MG/DL — LOW (ref 7–18)
CALCIUM SERPL-MCNC: 8.4 MG/DL — SIGNIFICANT CHANGE UP (ref 8.4–10.5)
CHLORIDE SERPL-SCNC: 99 MMOL/L — SIGNIFICANT CHANGE UP (ref 96–108)
CO2 SERPL-SCNC: 28 MMOL/L — SIGNIFICANT CHANGE UP (ref 22–31)
CREAT SERPL-MCNC: 0.69 MG/DL — SIGNIFICANT CHANGE UP (ref 0.5–1.3)
GLUCOSE BLDC GLUCOMTR-MCNC: 179 MG/DL — HIGH (ref 70–99)
GLUCOSE BLDC GLUCOMTR-MCNC: 218 MG/DL — HIGH (ref 70–99)
GLUCOSE BLDC GLUCOMTR-MCNC: 246 MG/DL — HIGH (ref 70–99)
GLUCOSE BLDC GLUCOMTR-MCNC: 348 MG/DL — HIGH (ref 70–99)
GLUCOSE SERPL-MCNC: 229 MG/DL — HIGH (ref 70–99)
HCT VFR BLD CALC: 36.9 % — LOW (ref 39–50)
HGB BLD-MCNC: 13.1 G/DL — SIGNIFICANT CHANGE UP (ref 13–17)
MCHC RBC-ENTMCNC: 35.1 PG — HIGH (ref 27–34)
MCHC RBC-ENTMCNC: 35.5 GM/DL — SIGNIFICANT CHANGE UP (ref 32–36)
MCV RBC AUTO: 98.9 FL — SIGNIFICANT CHANGE UP (ref 80–100)
NRBC # BLD: 0 /100 WBCS — SIGNIFICANT CHANGE UP (ref 0–0)
PLATELET # BLD AUTO: 118 K/UL — LOW (ref 150–400)
POTASSIUM SERPL-MCNC: 3.4 MMOL/L — LOW (ref 3.5–5.3)
POTASSIUM SERPL-SCNC: 3.4 MMOL/L — LOW (ref 3.5–5.3)
PROT SERPL-MCNC: 7.5 G/DL — SIGNIFICANT CHANGE UP (ref 6–8.3)
RBC # BLD: 3.73 M/UL — LOW (ref 4.2–5.8)
RBC # FLD: 12.3 % — SIGNIFICANT CHANGE UP (ref 10.3–14.5)
SODIUM SERPL-SCNC: 135 MMOL/L — SIGNIFICANT CHANGE UP (ref 135–145)
WBC # BLD: 3.43 K/UL — LOW (ref 3.8–10.5)
WBC # FLD AUTO: 3.43 K/UL — LOW (ref 3.8–10.5)

## 2019-07-21 RX ORDER — POTASSIUM CHLORIDE 20 MEQ
40 PACKET (EA) ORAL ONCE
Refills: 0 | Status: COMPLETED | OUTPATIENT
Start: 2019-07-21 | End: 2019-07-21

## 2019-07-21 RX ADMIN — Medication 1: at 17:41

## 2019-07-21 RX ADMIN — Medication 4: at 11:36

## 2019-07-21 RX ADMIN — Medication 40 MILLIEQUIVALENT(S): at 11:26

## 2019-07-21 RX ADMIN — ONDANSETRON 4 MILLIGRAM(S): 8 TABLET, FILM COATED ORAL at 13:45

## 2019-07-21 RX ADMIN — Medication 5 MILLIGRAM(S): at 21:57

## 2019-07-21 RX ADMIN — Medication 2: at 06:02

## 2019-07-21 RX ADMIN — Medication 105 MILLIGRAM(S): at 11:26

## 2019-07-21 RX ADMIN — ENOXAPARIN SODIUM 40 MILLIGRAM(S): 100 INJECTION SUBCUTANEOUS at 11:26

## 2019-07-21 RX ADMIN — SODIUM CHLORIDE 100 MILLILITER(S): 9 INJECTION, SOLUTION INTRAVENOUS at 11:34

## 2019-07-21 NOTE — PROGRESS NOTE ADULT - ASSESSMENT
63 Y old Male Amharic speaking with Significant alcohol intoxication presented with  abdominal pain, 4/10, non radiating. He felt like throwing up. He is binge drinker. Drinks 10 - 15 glasses in a day. He has been drinking Alcohol for many years, he was still in intoxicated state wasn't able to provide much history.

## 2019-07-22 DIAGNOSIS — E87.6 HYPOKALEMIA: ICD-10-CM

## 2019-07-22 LAB
ALBUMIN SERPL ELPH-MCNC: 3 G/DL — LOW (ref 3.5–5)
ALP SERPL-CCNC: 91 U/L — SIGNIFICANT CHANGE UP (ref 40–120)
ALT FLD-CCNC: 213 U/L DA — HIGH (ref 10–60)
ANION GAP SERPL CALC-SCNC: 8 MMOL/L — SIGNIFICANT CHANGE UP (ref 5–17)
AST SERPL-CCNC: 238 U/L — HIGH (ref 10–40)
BILIRUB SERPL-MCNC: 1.1 MG/DL — SIGNIFICANT CHANGE UP (ref 0.2–1.2)
BUN SERPL-MCNC: 4 MG/DL — LOW (ref 7–18)
CALCIUM SERPL-MCNC: 8.4 MG/DL — SIGNIFICANT CHANGE UP (ref 8.4–10.5)
CHLORIDE SERPL-SCNC: 101 MMOL/L — SIGNIFICANT CHANGE UP (ref 96–108)
CO2 SERPL-SCNC: 27 MMOL/L — SIGNIFICANT CHANGE UP (ref 22–31)
CREAT SERPL-MCNC: 0.64 MG/DL — SIGNIFICANT CHANGE UP (ref 0.5–1.3)
GLUCOSE BLDC GLUCOMTR-MCNC: 136 MG/DL — HIGH (ref 70–99)
GLUCOSE BLDC GLUCOMTR-MCNC: 186 MG/DL — HIGH (ref 70–99)
GLUCOSE BLDC GLUCOMTR-MCNC: 231 MG/DL — HIGH (ref 70–99)
GLUCOSE BLDC GLUCOMTR-MCNC: 378 MG/DL — HIGH (ref 70–99)
GLUCOSE SERPL-MCNC: 240 MG/DL — HIGH (ref 70–99)
HCT VFR BLD CALC: 36.4 % — LOW (ref 39–50)
HGB BLD-MCNC: 13 G/DL — SIGNIFICANT CHANGE UP (ref 13–17)
MCHC RBC-ENTMCNC: 34.9 PG — HIGH (ref 27–34)
MCHC RBC-ENTMCNC: 35.7 GM/DL — SIGNIFICANT CHANGE UP (ref 32–36)
MCV RBC AUTO: 97.8 FL — SIGNIFICANT CHANGE UP (ref 80–100)
NRBC # BLD: 0 /100 WBCS — SIGNIFICANT CHANGE UP (ref 0–0)
PLATELET # BLD AUTO: 124 K/UL — LOW (ref 150–400)
POTASSIUM SERPL-MCNC: 3.2 MMOL/L — LOW (ref 3.5–5.3)
POTASSIUM SERPL-SCNC: 3.2 MMOL/L — LOW (ref 3.5–5.3)
PROT SERPL-MCNC: 6.9 G/DL — SIGNIFICANT CHANGE UP (ref 6–8.3)
RBC # BLD: 3.72 M/UL — LOW (ref 4.2–5.8)
RBC # FLD: 12.2 % — SIGNIFICANT CHANGE UP (ref 10.3–14.5)
SODIUM SERPL-SCNC: 136 MMOL/L — SIGNIFICANT CHANGE UP (ref 135–145)
WBC # BLD: 3.59 K/UL — LOW (ref 3.8–10.5)
WBC # FLD AUTO: 3.59 K/UL — LOW (ref 3.8–10.5)

## 2019-07-22 RX ORDER — POTASSIUM CHLORIDE 20 MEQ
40 PACKET (EA) ORAL
Refills: 0 | Status: COMPLETED | OUTPATIENT
Start: 2019-07-22 | End: 2019-07-22

## 2019-07-22 RX ORDER — POTASSIUM CHLORIDE 20 MEQ
10 PACKET (EA) ORAL ONCE
Refills: 0 | Status: DISCONTINUED | OUTPATIENT
Start: 2019-07-22 | End: 2019-07-22

## 2019-07-22 RX ORDER — INSULIN LISPRO 100/ML
5 VIAL (ML) SUBCUTANEOUS ONCE
Refills: 0 | Status: COMPLETED | OUTPATIENT
Start: 2019-07-22 | End: 2019-07-22

## 2019-07-22 RX ORDER — INSULIN LISPRO 100/ML
VIAL (ML) SUBCUTANEOUS
Refills: 0 | Status: DISCONTINUED | OUTPATIENT
Start: 2019-07-22 | End: 2019-07-23

## 2019-07-22 RX ADMIN — Medication 5 MILLIGRAM(S): at 21:50

## 2019-07-22 RX ADMIN — Medication 1: at 21:50

## 2019-07-22 RX ADMIN — Medication 5 UNIT(S): at 12:29

## 2019-07-22 RX ADMIN — Medication 2: at 00:29

## 2019-07-22 RX ADMIN — ENOXAPARIN SODIUM 40 MILLIGRAM(S): 100 INJECTION SUBCUTANEOUS at 12:00

## 2019-07-22 RX ADMIN — SODIUM CHLORIDE 150 MILLILITER(S): 9 INJECTION, SOLUTION INTRAVENOUS at 06:39

## 2019-07-22 RX ADMIN — Medication 40 MILLIEQUIVALENT(S): at 17:40

## 2019-07-22 RX ADMIN — Medication 40 MILLIEQUIVALENT(S): at 10:16

## 2019-07-22 NOTE — PROGRESS NOTE ADULT - PROBLEM SELECTOR PLAN 3
Pt with CIWA 2 ,  No tremors  d/c MVI /thiamine and folic acid in NS. (tolerating consisten carbohydrate diet) Pt with CIWA 2   No tremors  Diet advanced to consistent carbohydrate (tolerating well)

## 2019-07-22 NOTE — PROGRESS NOTE ADULT - PROBLEM SELECTOR PLAN 2
No further vomiting, abd pain present but better  + Tenderness
improved

## 2019-07-22 NOTE — PROGRESS NOTE ADULT - SUBJECTIVE AND OBJECTIVE BOX
CHIEF COMPLAINT:Patient is a 63y old  Male who presents with a chief complaint of Alcohol intoxication (19 Jul 2019 13:28)    	  REVIEW OF SYSTEMS:  CONSTITUTIONAL: No fever, weight loss, or fatigue  EYES: No eye pain, visual disturbances, or discharge  ENMT:  No difficulty hearing, tinnitus, vertigo; No sinus or throat pain  NECK: No pain or stiffness  RESPIRATORY: No cough, wheezing, chills or hemoptysis; No Shortness of Breath  CARDIOVASCULAR: No chest pain, palpitations, passing out, dizziness, or leg swelling  GASTROINTESTINAL: No abdominal or epigastric pain. No nausea, vomiting, or hematemesis; No diarrhea or constipation. No melena or hematochezia.  GENITOURINARY: No dysuria, frequency, hematuria, or incontinence  NEUROLOGICAL: No headaches, memory loss, loss of strength, numbness, or tremors  SKIN: No itching, burning, rashes, or lesions   LYMPH Nodes: No enlarged glands  ENDOCRINE: No heat or cold intolerance; No hair loss  MUSCULOSKELETAL: No joint pain or swelling; No muscle, back, or extremity pain  PSYCHIATRIC: No depression, anxiety, mood swings, or difficulty sleeping  HEME/LYMPH: No easy bruising, or bleeding gums  ALLERY AND IMMUNOLOGIC: No hives or eczema	    [ ] All others negative	  [ ] Unable to obtain    PHYSICAL EXAM:  T(C): 36.9 (07-20-19 @ 21:11), Max: 36.9 (07-20-19 @ 21:11)  HR: 75 (07-20-19 @ 21:11) (67 - 80)  BP: 133/75 (07-20-19 @ 21:11) (116/63 - 134/74)  RR: 16 (07-20-19 @ 21:11) (16 - 18)  SpO2: 100% (07-20-19 @ 21:11) (98% - 100%)  Wt(kg): --  I&O's Summary      Appearance: Normal	  HEENT:   Normal oral mucosa, PERRL, EOMI	  Lymphatic: No lymphadenopathy  Cardiovascular: Normal S1 S2, No JVD, No murmurs, No edema  Respiratory: Lungs clear to auscultation	  Psychiatry: A & O x 1-2, Mood & affect appropriate  Gastrointestinal:  Soft, Non-tender, + BS	  Skin: No rashes, No ecchymoses, No cyanosis	  Neurologic: Non-focal  Extremities: Normal range of motion, No clubbing, cyanosis or edema  Vascular: Peripheral pulses palpable 2+ bilaterally    MEDICATIONS  (STANDING):  dextrose 5% + sodium chloride 0.45%. 1000 milliLiter(s) (150 mL/Hr) IV Continuous <Continuous>  enoxaparin Injectable 40 milliGRAM(s) SubCutaneous daily  insulin lispro (HumaLOG) corrective regimen sliding scale   SubCutaneous every 6 hours  melatonin 5 milliGRAM(s) Oral at bedtime  multivitamin/thiamine/folic acid in sodium chloride 0.9% 1000 milliLiter(s) (100 mL/Hr) IV Continuous <Continuous>  thiamine IVPB 500 milliGRAM(s) IV Intermittent daily      TELEMETRY: 	    ECG:  	  RADIOLOGY:  OTHER: 	  	  CBC Full  -  ( 20 Jul 2019 08:12 )  WBC Count : 3.57 K/uL  Hemoglobin : 12.4 g/dL  Hematocrit : 35.3 %  Platelet Count - Automated : 111 K/uL  Mean Cell Volume : 99.2 fl  Mean Cell Hemoglobin : 34.8 pg  Mean Cell Hemoglobin Concentration : 35.1 gm/dL  Auto Neutrophil # : x  Auto Lymphocyte # : x  Auto Monocyte # : x  Auto Eosinophil # : x  Auto Basophil # : x  Auto Neutrophil % : x  Auto Lymphocyte % : x  Auto Monocyte % : x  Auto Eosinophil % : x  Auto Basophil % : x        CARDIAC MARKERS:                              12.4   3.57  )-----------( 111      ( 20 Jul 2019 08:12 )             35.3       07-20    134<L>  |  100  |  7   ----------------------------<  142<H>  3.4<L>   |  22  |  0.71    Ca    7.9<L>      20 Jul 2019 08:12  Phos  2.2     07-20  Mg     2.0     07-19    TPro  7.1  /  Alb  3.1<L>  /  TBili  1.4<H>  /  DBili  x   /  AST  163<H>  /  ALT  113<H>  /  AlkPhos  89  07-20            proBNP:   Lipid Profile: Cholesterol 233    HDL 59      HgA1c: Hemoglobin A1C, Whole Blood: 9.9 % (07-19 @ 09:07)    TSH: Thyroid Stimulating Hormone, Serum: 3.29 uU/mL (07-19 @ 05:56)
CHIEF COMPLAINT:Patient is a 63y old  Male who presents with a chief complaint of Alcohol intoxication (20 Jul 2019 21:28)    	  REVIEW OF SYSTEMS:  CONSTITUTIONAL: No fever, weight loss, or fatigue  EYES: No eye pain, visual disturbances, or discharge  ENMT:  No difficulty hearing, tinnitus, vertigo; No sinus or throat pain  NECK: No pain or stiffness  RESPIRATORY: No cough, wheezing, chills or hemoptysis; No Shortness of Breath  CARDIOVASCULAR: No chest pain, palpitations, passing out, dizziness, or leg swelling  GASTROINTESTINAL: No abdominal or epigastric pain. No nausea, vomiting, or hematemesis; No diarrhea or constipation. No melena or hematochezia.  GENITOURINARY: No dysuria, frequency, hematuria, or incontinence  NEUROLOGICAL: No headaches, memory loss, loss of strength, numbness, or tremors  SKIN: No itching, burning, rashes, or lesions   LYMPH Nodes: No enlarged glands  ENDOCRINE: No heat or cold intolerance; No hair loss  MUSCULOSKELETAL: No joint pain or swelling; No muscle, back, or extremity pain  PSYCHIATRIC: No depression, anxiety, mood swings, or difficulty sleeping  HEME/LYMPH: No easy bruising, or bleeding gums  ALLERY AND IMMUNOLOGIC: No hives or eczema	    [ ] All others negative	  [ ] Unable to obtain    PHYSICAL EXAM:  T(C): 37 (07-21-19 @ 14:25), Max: 37 (07-21-19 @ 14:25)  HR: 82 (07-21-19 @ 14:25) (69 - 82)  BP: 123/63 (07-21-19 @ 14:25) (121/64 - 133/75)  RR: 17 (07-21-19 @ 14:25) (16 - 17)  SpO2: 97% (07-21-19 @ 14:25) (97% - 100%)  Wt(kg): --  I&O's Summary      Appearance: Normal	  HEENT:   Normal oral mucosa, PERRL, EOMI	  Lymphatic: No lymphadenopathy  Cardiovascular: Normal S1 S2, No JVD, No murmurs, No edema  Respiratory: Lungs clear to auscultation	  Psychiatry: A & O x 3, Mood & affect appropriate  Gastrointestinal:  Soft, Non-tender, + BS	  Skin: No rashes, No ecchymoses, No cyanosis	  Neurologic: Non-focal  Extremities: Normal range of motion, No clubbing, cyanosis or edema  Vascular: Peripheral pulses palpable 2+ bilaterally    MEDICATIONS  (STANDING):  dextrose 5% + sodium chloride 0.45%. 1000 milliLiter(s) (150 mL/Hr) IV Continuous <Continuous>  enoxaparin Injectable 40 milliGRAM(s) SubCutaneous daily  insulin lispro (HumaLOG) corrective regimen sliding scale   SubCutaneous every 6 hours  melatonin 5 milliGRAM(s) Oral at bedtime  multivitamin/thiamine/folic acid in sodium chloride 0.9% 1000 milliLiter(s) (100 mL/Hr) IV Continuous <Continuous>      TELEMETRY: 	    ECG:  	  RADIOLOGY:  OTHER: 	  	  CBC Full  -  ( 21 Jul 2019 07:36 )  WBC Count : 3.43 K/uL  Hemoglobin : 13.1 g/dL  Hematocrit : 36.9 %  Platelet Count - Automated : 118 K/uL  Mean Cell Volume : 98.9 fl  Mean Cell Hemoglobin : 35.1 pg  Mean Cell Hemoglobin Concentration : 35.5 gm/dL  Auto Neutrophil # : x  Auto Lymphocyte # : x  Auto Monocyte # : x  Auto Eosinophil # : x  Auto Basophil # : x  Auto Neutrophil % : x  Auto Lymphocyte % : x  Auto Monocyte % : x  Auto Eosinophil % : x  Auto Basophil % : x        CARDIAC MARKERS:                              13.1   3.43  )-----------( 118      ( 21 Jul 2019 07:36 )             36.9       07-21    135  |  99  |  4<L>  ----------------------------<  229<H>  3.4<L>   |  28  |  0.69    Ca    8.4      21 Jul 2019 07:36  Phos  2.2     07-20    TPro  7.5  /  Alb  3.2<L>  /  TBili  1.4<H>  /  DBili  x   /  AST  270<H>  /  ALT  189<H>  /  AlkPhos  93  07-21            proBNP:   Lipid Profile: Cholesterol 233    HDL 59      HgA1c: Hemoglobin A1C, Whole Blood: 9.9 % (07-19 @ 09:07)    TSH: Thyroid Stimulating Hormone, Serum: 3.29 uU/mL (07-19 @ 05:56)
Patient is a 63y old  Male who presents with a chief complaint of Alcohol intoxication (21 Jul 2019 17:09)      INTERVAL HPI/OVERNIGHT EVENTS:  T(C): 37.2 (07-22-19 @ 13:54), Max: 37.2 (07-22-19 @ 13:54)  HR: 80 (07-22-19 @ 13:54) (73 - 80)  BP: 120/70 (07-22-19 @ 13:54) (120/70 - 135/74)  RR: 16 (07-22-19 @ 13:54) (16 - 16)  SpO2: 98% (07-22-19 @ 13:54) (96% - 99%)  Wt(kg): --  I&O's Summary      PAST MEDICAL & SURGICAL HISTORY:  EtOH dependence  No significant past surgical history            LABS:                        13.0   3.59  )-----------( 124      ( 22 Jul 2019 07:51 )             36.4     07-22    136  |  101  |  4<L>  ----------------------------<  240<H>  3.2<L>   |  27  |  0.64    Ca    8.4      22 Jul 2019 07:51    TPro  6.9  /  Alb  3.0<L>  /  TBili  1.1  /  DBili  x   /  AST  238<H>  /  ALT  213<H>  /  AlkPhos  91  07-22        CAPILLARY BLOOD GLUCOSE      POCT Blood Glucose.: 136 mg/dL (22 Jul 2019 16:34)  POCT Blood Glucose.: 378 mg/dL (22 Jul 2019 11:36)  POCT Blood Glucose.: 231 mg/dL (22 Jul 2019 06:22)  POCT Blood Glucose.: 218 mg/dL (21 Jul 2019 23:51)            MEDICATIONS  (STANDING):  dextrose 5% + sodium chloride 0.45%. 1000 milliLiter(s) (150 mL/Hr) IV Continuous <Continuous>  enoxaparin Injectable 40 milliGRAM(s) SubCutaneous daily  insulin lispro (HumaLOG) corrective regimen sliding scale   SubCutaneous Before meals and at bedtime  melatonin 5 milliGRAM(s) Oral at bedtime  multivitamin/thiamine/folic acid in sodium chloride 0.9% 1000 milliLiter(s) (100 mL/Hr) IV Continuous <Continuous>  potassium chloride   Powder 40 milliEquivalent(s) Oral two times a day    MEDICATIONS  (PRN):  LORazepam   Injectable 1 milliGRAM(s) IV Push every 4 hours PRN CIWA-Ar score increase by 2 points and a total score of 7 or less  LORazepam   Injectable 1 milliGRAM(s) IntraMuscular four times a day PRN Agitation  ondansetron Injectable 4 milliGRAM(s) IV Push every 6 hours PRN Nausea and/or Vomiting      REVIEW OF SYSTEMS:  CONSTITUTIONAL: No fever, weight loss, or fatigue  EYES: No eye pain, visual disturbances, or discharge  ENMT:  No difficulty hearing, tinnitus, vertigo; No sinus or throat pain  NECK: No pain or stiffness  RESPIRATORY: No cough, wheezing, chills or hemoptysis; No shortness of breath  CARDIOVASCULAR: No chest pain, palpitations, dizziness, or leg swelling  GASTROINTESTINAL: No abdominal or epigastric pain. No nausea, vomiting, or hematemesis; No diarrhea or constipation. No melena or hematochezia.  GENITOURINARY: No dysuria, frequency, hematuria, or incontinence  NEUROLOGICAL: No headaches, memory loss, loss of strength, numbness, or tremors  SKIN: No itching, burning, rashes, or lesions   LYMPH NODES: No enlarged glands  ENDOCRINE: No heat or cold intolerance; No hair loss  MUSCULOSKELETAL: No joint pain or swelling; No muscle, back, or extremity pain  PSYCHIATRIC: No depression, anxiety, mood swings, or difficulty sleeping  HEME/LYMPH: No easy bruising, or bleeding gums  ALLERY AND IMMUNOLOGIC: No hives or eczema    RADIOLOGY & ADDITIONAL TESTS:    Imaging Personally Reviewed:  [x ] YES  [ ] NO    Consultant(s) Notes Reviewed:  [x ] YES  [ ] NO    PHYSICAL EXAM:  GENERAL: NAD, well-groomed, well-developed  HEAD:  Atraumatic, Normocephalic  EYES: EOMI, PERRLA, conjunctiva and sclera clear  ENMT: No tonsillar erythema, exudates, or enlargement; Moist mucous membranes, Good dentition, No lesions  NECK: Supple, No JVD, Normal thyroid  NERVOUS SYSTEM:  Alert & Oriented X3, Good concentration  CHEST/LUNG: Clear to percussion bilaterally; No rales, rhonchi, wheezing, or rubs  HEART: Regular rate and rhythm; No murmurs, rubs, or gallops  ABDOMEN: Soft, Nontender, Nondistended; Bowel sounds present  EXTREMITIES:  2+ Peripheral Pulses, No clubbing, cyanosis, or edema  LYMPH: No lymphadenopathy noted  SKIN: No rashes or lesions    Care Collaborated Discussed with Consultants/Other Providers [x ] YES  [ ] NO
NP Note discussed with  Primary Attending    Patient is a 63y old  Male who presents with a chief complaint of Alcohol intoxication (18 Jul 2019 20:24)      INTERVAL HPI/OVERNIGHT EVENTS: no new complaints    MEDICATIONS  (STANDING):  dextrose 5% + sodium chloride 0.45%. 1000 milliLiter(s) (150 mL/Hr) IV Continuous <Continuous>  enoxaparin Injectable 40 milliGRAM(s) SubCutaneous daily  insulin lispro (HumaLOG) corrective regimen sliding scale   SubCutaneous every 6 hours  multivitamin/thiamine/folic acid in sodium chloride 0.9% 1000 milliLiter(s) (100 mL/Hr) IV Continuous <Continuous>  ondansetron Injectable 4 milliGRAM(s) IV Push once  thiamine IVPB 500 milliGRAM(s) IV Intermittent daily    MEDICATIONS  (PRN):  LORazepam   Injectable 1 milliGRAM(s) IV Push every 4 hours PRN CIWA-Ar score increase by 2 points and a total score of 7 or less  LORazepam   Injectable 1 milliGRAM(s) IntraMuscular four times a day PRN Agitation      __________________________________________________  REVIEW OF SYSTEMS:    CONSTITUTIONAL: No fever,   EYES: no acute visual disturbances  NECK: No pain or stiffness  RESPIRATORY: No cough; No shortness of breath  CARDIOVASCULAR: No chest pain, no palpitations  GASTROINTESTINAL: + pain. + nausea , no vomiting; No diarrhea   NEUROLOGICAL: No headache or numbness, no tremors  MUSCULOSKELETAL: No joint pain, no muscle pain  GENITOURINARY: no dysuria, no frequency, no hesitancy  PSYCHIATRY: no depression , no anxiety  ALL OTHER  ROS negative        Vital Signs Last 24 Hrs  T(C): 36.6 (19 Jul 2019 13:19), Max: 37 (18 Jul 2019 15:27)  T(F): 97.8 (19 Jul 2019 13:19), Max: 98.6 (18 Jul 2019 15:27)  HR: 85 (19 Jul 2019 13:19) (84 - 89)  BP: 136/66 (19 Jul 2019 13:19) (113/51 - 155/66)  BP(mean): --  RR: 17 (19 Jul 2019 13:19) (16 - 18)  SpO2: 98% (19 Jul 2019 13:19) (95% - 98%)    ________________________________________________  PHYSICAL EXAM:  GENERAL: NAD  HEENT: Normocephalic;  conjunctivae and sclerae clear; moist mucous membranes;   NECK : supple  CHEST/LUNG: Clear to auscultation  HEART: S1 S2  regular;   ABDOMEN: Soft, + tender, Nondistended; Bowel sounds present  EXTREMITIES: no cyanosis; no edema; no calf tenderness  SKIN: warm and dry; no rash  NERVOUS SYSTEM:  Awake and alert; Oriented  to place, person and time    _________________________________________________  LABS:                        12.8   5.24  )-----------( 139      ( 19 Jul 2019 05:56 )             37.1     07-19    136  |  101  |  14  ----------------------------<  155<H>  3.6   |  18<L>  |  0.77    Ca    7.7<L>      19 Jul 2019 05:56  Phos  2.4     07-19  Mg     2.0     07-19    TPro  7.6  /  Alb  3.5  /  TBili  1.6<H>  /  DBili  0.4<H>  /  AST  151<H>  /  ALT  111<H>  /  AlkPhos  93  07-19        CAPILLARY BLOOD GLUCOSE      POCT Blood Glucose.: 165 mg/dL (19 Jul 2019 11:41)  POCT Blood Glucose.: 163 mg/dL (19 Jul 2019 05:43)  POCT Blood Glucose.: 147 mg/dL (18 Jul 2019 23:59)        RADIOLOGY & ADDITIONAL TESTS:  < from: US Abdomen Limited (07.19.19 @ 10:05) >  Study limited to evaluation of the hepatic surface does not demonstrate   nodularity. There is fatty infiltration of the liver as seen on the CAT   scan of the previous day    < end of copied text >  < from: CT Abdomen and Pelvis w/ IV Cont (07.18.19 @ 21:35) >  significant diffuse hepatic steatosis.    < end of copied text >    Imaging Personally Reviewed:  YES  Consultant(s) Notes Reviewed:   YES  Care Discussed with Consultants :     Plan of care was discussed with patient; all questions and concerns were addressed and care was aligned with patient's wishes.

## 2019-07-22 NOTE — PROGRESS NOTE ADULT - PROBLEM SELECTOR PLAN 1
Pt c/o nausea. Zofran ordered.  Pt also requesting food. Diet ordered for after Zofran
Pt c/o nausea. Zofran ordered.  Pt also requesting food. Diet ordered for after Zofran
c/w zofran prn
Pt c/o nausea. Zofran ordered.  Pt also requesting food. Diet ordered for after Zofran

## 2019-07-22 NOTE — PROGRESS NOTE ADULT - PROBLEM SELECTOR PROBLEM 4
Acute alcoholic gastritis, presence of bleeding unspecified
Diabetes

## 2019-07-22 NOTE — PROGRESS NOTE ADULT - PROBLEM SELECTOR PLAN 4
Pantoprazole IV ordered , if tolerating diet will change to PO
a1c-9.9%  consistent carbohydrate no snack  c/w ss insulin  monitor FS

## 2019-07-22 NOTE — DIETITIAN INITIAL EVALUATION ADULT. - ADD RECOMMEND
Diet was changed to Diabetic, Suggest social service consult and Endo consult if medically appropriate

## 2019-07-22 NOTE — DIETITIAN INITIAL EVALUATION ADULT. - OTHER INFO
Pt visited. OOB to chair. Pt is Azeri speaking. Information obtained via [email or web address]  Phone ID # [phone] Vianca. Pt reports Recently Dx DM 1 year ago in this Hospital. Pt was on insulin and oral Hypoglycemic agents. But later on insulin was Stopped . Pt goes to  [facility] for DM  management. Pt has Blood sugar machine at  Home but does not check it. But At present Pt requesting For Supplies for the machine. At home lives with the son. Pt cooks for himself. Pt eats well. NKFA. Weight stable. Appetite is good at present. Pt admitts to NON complinance to the diet Like drinking Regular coke,  Rice and Breads ( increased amount of carbohydrate) Pt W ETOH for many years. Also Binge drinking. Pt wants to find out that he needs to take at Home

## 2019-07-22 NOTE — PROGRESS NOTE ADULT - ASSESSMENT
63 Y old Male Kittitian speaking with Significant alcohol intoxication presented with  abdominal pain, 4/10, non radiating. He felt like throwing up. He is binge drinker. Drinks 10 - 15 glasses in a day. He has been drinking Alcohol for many years. Patient seen at [facility] today, ambulatory AxOx3, denies abdominal   pain or discomfort. Tolerating consistent carbohydrate diet. 63 Y old Male New Zealander speaking with Significant alcohol intoxication presented with  abdominal pain, 4/10, non radiating. He felt like throwing up. He is binge drinker. Drinks 10 - 15 glasses in a day. He has been drinking Alcohol for many years. Patient seen at [facility] today, ambulatory AxOx3, denies abdominal  pain or discomfort. Tolerating consistent carbohydrate diet. Potassium level low today.

## 2019-07-22 NOTE — DIETITIAN INITIAL EVALUATION ADULT. - PROBLEM SELECTOR PLAN 1
Patient is intoxicated.  -multivitamin/thiamin/folic acid in 0/9 % Nacl. ( banana bag) ordered once stat. (Primary team to reordeer if needed.  -Ativan 1 mg q4  U/S abdomen  -amylase  -lipase  -CIWA protocol.  - consult  -pysch consult.

## 2019-07-22 NOTE — PROGRESS NOTE ADULT - PROBLEM SELECTOR PLAN 5
GI PPX with IV pantoprazole  DVT ppx with Lovenox
c/w DVT ppx with Lovenox.

## 2019-07-22 NOTE — PROGRESS NOTE ADULT - ATTENDING COMMENTS
Patient is seen and examined. Case reviewed with the medical team. Above note is appreciated. Will follow up clinically. Continue DVT prophylaxis. Case discussed with house staff. Follow up with psychiatry. Ativan prn.
Patient is seen and examined. Case reviewed with the medical team. Above note is appreciated. Will follow up clinically. Continue DVT prophylaxis. Case discussed with house staff. Follow up with psychiatry. Ativan prn.
Patient is seen and examined. Case reviewed with the medical team. Above note is appreciated. Will follow up clinically. Continue DVT prophylaxis. Case discussed with house staff.
Patient is seen and examined. Case reviewed with the medical team. Above note is appreciated. Will follow up clinically. Continue DVT prophylaxis. Replace K. Diabetes better controlled. Will change to oral tabs, likely amaryl and will refer to medical clinic for follow up upon discharge.

## 2019-07-23 VITALS
OXYGEN SATURATION: 99 % | TEMPERATURE: 98 F | DIASTOLIC BLOOD PRESSURE: 72 MMHG | SYSTOLIC BLOOD PRESSURE: 117 MMHG | HEART RATE: 79 BPM | RESPIRATION RATE: 16 BRPM

## 2019-07-23 LAB
ALBUMIN SERPL ELPH-MCNC: 3.3 G/DL — LOW (ref 3.5–5)
ALP SERPL-CCNC: 104 U/L — SIGNIFICANT CHANGE UP (ref 40–120)
ALT FLD-CCNC: 223 U/L DA — HIGH (ref 10–60)
ANION GAP SERPL CALC-SCNC: 9 MMOL/L — SIGNIFICANT CHANGE UP (ref 5–17)
AST SERPL-CCNC: 194 U/L — HIGH (ref 10–40)
BILIRUB DIRECT SERPL-MCNC: 0.2 MG/DL — SIGNIFICANT CHANGE UP (ref 0–0.2)
BILIRUB INDIRECT FLD-MCNC: 0.7 MG/DL — SIGNIFICANT CHANGE UP (ref 0.2–1)
BILIRUB SERPL-MCNC: 0.9 MG/DL — SIGNIFICANT CHANGE UP (ref 0.2–1.2)
BUN SERPL-MCNC: 6 MG/DL — LOW (ref 7–18)
CALCIUM SERPL-MCNC: 8.9 MG/DL — SIGNIFICANT CHANGE UP (ref 8.4–10.5)
CHLORIDE SERPL-SCNC: 97 MMOL/L — SIGNIFICANT CHANGE UP (ref 96–108)
CO2 SERPL-SCNC: 28 MMOL/L — SIGNIFICANT CHANGE UP (ref 22–31)
CREAT SERPL-MCNC: 0.6 MG/DL — SIGNIFICANT CHANGE UP (ref 0.5–1.3)
GLUCOSE BLDC GLUCOMTR-MCNC: 211 MG/DL — HIGH (ref 70–99)
GLUCOSE BLDC GLUCOMTR-MCNC: 221 MG/DL — HIGH (ref 70–99)
GLUCOSE BLDC GLUCOMTR-MCNC: 254 MG/DL — HIGH (ref 70–99)
GLUCOSE SERPL-MCNC: 190 MG/DL — HIGH (ref 70–99)
HCT VFR BLD CALC: 38.2 % — LOW (ref 39–50)
HGB BLD-MCNC: 13.5 G/DL — SIGNIFICANT CHANGE UP (ref 13–17)
MCHC RBC-ENTMCNC: 34.7 PG — HIGH (ref 27–34)
MCHC RBC-ENTMCNC: 35.3 GM/DL — SIGNIFICANT CHANGE UP (ref 32–36)
MCV RBC AUTO: 98.2 FL — SIGNIFICANT CHANGE UP (ref 80–100)
NRBC # BLD: 0 /100 WBCS — SIGNIFICANT CHANGE UP (ref 0–0)
PLATELET # BLD AUTO: 135 K/UL — LOW (ref 150–400)
POTASSIUM SERPL-MCNC: 3.6 MMOL/L — SIGNIFICANT CHANGE UP (ref 3.5–5.3)
POTASSIUM SERPL-SCNC: 3.6 MMOL/L — SIGNIFICANT CHANGE UP (ref 3.5–5.3)
PROT SERPL-MCNC: 7.5 G/DL — SIGNIFICANT CHANGE UP (ref 6–8.3)
RBC # BLD: 3.89 M/UL — LOW (ref 4.2–5.8)
RBC # FLD: 12.4 % — SIGNIFICANT CHANGE UP (ref 10.3–14.5)
SODIUM SERPL-SCNC: 134 MMOL/L — LOW (ref 135–145)
WBC # BLD: 4.79 K/UL — SIGNIFICANT CHANGE UP (ref 3.8–10.5)
WBC # FLD AUTO: 4.79 K/UL — SIGNIFICANT CHANGE UP (ref 3.8–10.5)

## 2019-07-23 PROCEDURE — 82746 ASSAY OF FOLIC ACID SERUM: CPT

## 2019-07-23 PROCEDURE — 82150 ASSAY OF AMYLASE: CPT

## 2019-07-23 PROCEDURE — 84443 ASSAY THYROID STIM HORMONE: CPT

## 2019-07-23 PROCEDURE — 82306 VITAMIN D 25 HYDROXY: CPT

## 2019-07-23 PROCEDURE — 82962 GLUCOSE BLOOD TEST: CPT

## 2019-07-23 PROCEDURE — 84100 ASSAY OF PHOSPHORUS: CPT

## 2019-07-23 PROCEDURE — 96374 THER/PROPH/DIAG INJ IV PUSH: CPT

## 2019-07-23 PROCEDURE — 80048 BASIC METABOLIC PNL TOTAL CA: CPT

## 2019-07-23 PROCEDURE — 76705 ECHO EXAM OF ABDOMEN: CPT

## 2019-07-23 PROCEDURE — 82607 VITAMIN B-12: CPT

## 2019-07-23 PROCEDURE — 36415 COLL VENOUS BLD VENIPUNCTURE: CPT

## 2019-07-23 PROCEDURE — 83690 ASSAY OF LIPASE: CPT

## 2019-07-23 PROCEDURE — 80074 ACUTE HEPATITIS PANEL: CPT

## 2019-07-23 PROCEDURE — 82803 BLOOD GASES ANY COMBINATION: CPT

## 2019-07-23 PROCEDURE — 80061 LIPID PANEL: CPT

## 2019-07-23 PROCEDURE — 85027 COMPLETE CBC AUTOMATED: CPT

## 2019-07-23 PROCEDURE — 96375 TX/PRO/DX INJ NEW DRUG ADDON: CPT

## 2019-07-23 PROCEDURE — 80053 COMPREHEN METABOLIC PANEL: CPT

## 2019-07-23 PROCEDURE — 76700 US EXAM ABDOM COMPLETE: CPT

## 2019-07-23 PROCEDURE — 83735 ASSAY OF MAGNESIUM: CPT

## 2019-07-23 PROCEDURE — 99285 EMERGENCY DEPT VISIT HI MDM: CPT | Mod: 25

## 2019-07-23 PROCEDURE — 80307 DRUG TEST PRSMV CHEM ANLYZR: CPT

## 2019-07-23 PROCEDURE — 80076 HEPATIC FUNCTION PANEL: CPT

## 2019-07-23 PROCEDURE — 83605 ASSAY OF LACTIC ACID: CPT

## 2019-07-23 PROCEDURE — 83036 HEMOGLOBIN GLYCOSYLATED A1C: CPT

## 2019-07-23 PROCEDURE — 74177 CT ABD & PELVIS W/CONTRAST: CPT

## 2019-07-23 RX ORDER — METFORMIN HYDROCHLORIDE 850 MG/1
1 TABLET ORAL
Qty: 60 | Refills: 0
Start: 2019-07-23 | End: 2019-08-21

## 2019-07-23 RX ORDER — POTASSIUM CHLORIDE 20 MEQ
40 PACKET (EA) ORAL ONCE
Refills: 0 | Status: COMPLETED | OUTPATIENT
Start: 2019-07-23 | End: 2019-07-23

## 2019-07-23 RX ADMIN — ENOXAPARIN SODIUM 40 MILLIGRAM(S): 100 INJECTION SUBCUTANEOUS at 11:37

## 2019-07-23 RX ADMIN — Medication 2: at 08:30

## 2019-07-23 RX ADMIN — Medication 40 MILLIEQUIVALENT(S): at 13:43

## 2019-07-23 RX ADMIN — Medication 2: at 12:43

## 2019-07-23 NOTE — DISCHARGE NOTE PROVIDER - CARE PROVIDER_API CALL
[facility]  [address] [place] [id number]  Phone: [phone]  Fax: (   )    -  Follow Up Time: 1 week

## 2019-07-23 NOTE — DISCHARGE NOTE NURSING/CASE MANAGEMENT/SOCIAL WORK - NSDCDPATPORTLINK_GEN_ALL_CORE
You can access the [facility], offered by [facility], by registering with the following website: [email or web address]

## 2019-07-23 NOTE — DISCHARGE NOTE PROVIDER - HOSPITAL COURSE
actively on HAART therapy at home    primary team to contact pharmacy to determine current regimen. 63 Y old Male Palestinian speaking with Significant alcohol intoxication presented with  abdominal pain, 4/10, non radiating. He felt like throwing up. He is binge drinker. Drinks 10 - 15 glasses in a day. He has been drinking Alcohol for many years.    admitted to medicine for alcohol intoxication    Completed course of IVF as per [facility] protocol, tolerating regular diet well without pain. Pt is medically stable to discharge to home. Pt was consulted with SW before d/c today regarding medication and follow up appointment.    Pt goes to [facility] clinic for his DM management and he clearly said that he has full bottle of metformin at home. He went to clinic last month and got this bottle but he has not taken medication because he was drinking. He also said, he can't go to [facility] within 1 weeks due to his financial status. He does not have family in [place] but his roommates used to help him financially when he needs to go to clinic. Reinforced importance of follow up with appointment and instruction of medication for DM control. He is AO x 3.  TARIK will provide metro card to go home today.

## 2019-07-23 NOTE — DISCHARGE NOTE PROVIDER - NSDCCPCAREPLAN_GEN_ALL_CORE_FT
PRINCIPAL DISCHARGE DIAGNOSIS  Diagnosis: EtOH dependence  Assessment and Plan of Treatment: you were admitted to hospital due to alcohol intoxication  you need to stop drinking and take medication for your diabetes as prescribed   If you need help please contact to  in community      SECONDARY DISCHARGE DIAGNOSES  Diagnosis: Diabetes  Assessment and Plan of Treatment: you HgA1c was 9.9%   take metformin 500mg 2 times daily as prescribed by [facility] and please follow up with clinic before running out of your medication   Monitor FS at home

## 2019-08-21 ENCOUNTER — EMERGENCY (EMERGENCY)
Facility: HOSPITAL | Age: 64
LOS: 1 days | Discharge: HOME | End: 2019-08-21
Attending: EMERGENCY MEDICINE
Payer: SELF-PAY

## 2019-08-21 VITALS
TEMPERATURE: 98 F | HEIGHT: 61 IN | DIASTOLIC BLOOD PRESSURE: 76 MMHG | RESPIRATION RATE: 17 BRPM | HEART RATE: 99 BPM | SYSTOLIC BLOOD PRESSURE: 130 MMHG | WEIGHT: 169.98 LBS | OXYGEN SATURATION: 97 %

## 2019-08-21 VITALS
DIASTOLIC BLOOD PRESSURE: 71 MMHG | OXYGEN SATURATION: 96 % | SYSTOLIC BLOOD PRESSURE: 129 MMHG | HEART RATE: 83 BPM | TEMPERATURE: 98 F | RESPIRATION RATE: 16 BRPM

## 2019-08-21 LAB
ANION GAP SERPL CALC-SCNC: 12 MMOL/L — SIGNIFICANT CHANGE UP (ref 5–17)
APAP SERPL-MCNC: <2 UG/ML — LOW (ref 10–30)
BASOPHILS # BLD AUTO: 0.03 K/UL — SIGNIFICANT CHANGE UP (ref 0–0.2)
BASOPHILS NFR BLD AUTO: 0.5 % — SIGNIFICANT CHANGE UP (ref 0–2)
BUN SERPL-MCNC: 9 MG/DL — SIGNIFICANT CHANGE UP (ref 7–18)
CALCIUM SERPL-MCNC: 8.8 MG/DL — SIGNIFICANT CHANGE UP (ref 8.4–10.5)
CHLORIDE SERPL-SCNC: 103 MMOL/L — SIGNIFICANT CHANGE UP (ref 96–108)
CO2 SERPL-SCNC: 26 MMOL/L — SIGNIFICANT CHANGE UP (ref 22–31)
CREAT SERPL-MCNC: 0.67 MG/DL — SIGNIFICANT CHANGE UP (ref 0.5–1.3)
EOSINOPHIL # BLD AUTO: 0.07 K/UL — SIGNIFICANT CHANGE UP (ref 0–0.5)
EOSINOPHIL NFR BLD AUTO: 1.2 % — SIGNIFICANT CHANGE UP (ref 0–6)
ETHANOL SERPL-MCNC: 272 MG/DL — HIGH (ref 0–10)
GLUCOSE SERPL-MCNC: 176 MG/DL — HIGH (ref 70–99)
HCT VFR BLD CALC: 41.5 % — SIGNIFICANT CHANGE UP (ref 39–50)
HGB BLD-MCNC: 14.3 G/DL — SIGNIFICANT CHANGE UP (ref 13–17)
IMM GRANULOCYTES NFR BLD AUTO: 0.2 % — SIGNIFICANT CHANGE UP (ref 0–1.5)
LYMPHOCYTES # BLD AUTO: 1.59 K/UL — SIGNIFICANT CHANGE UP (ref 1–3.3)
LYMPHOCYTES # BLD AUTO: 28 % — SIGNIFICANT CHANGE UP (ref 13–44)
MCHC RBC-ENTMCNC: 34.4 PG — HIGH (ref 27–34)
MCHC RBC-ENTMCNC: 34.5 GM/DL — SIGNIFICANT CHANGE UP (ref 32–36)
MCV RBC AUTO: 99.8 FL — SIGNIFICANT CHANGE UP (ref 80–100)
MONOCYTES # BLD AUTO: 0.3 K/UL — SIGNIFICANT CHANGE UP (ref 0–0.9)
MONOCYTES NFR BLD AUTO: 5.3 % — SIGNIFICANT CHANGE UP (ref 2–14)
NEUTROPHILS # BLD AUTO: 3.67 K/UL — SIGNIFICANT CHANGE UP (ref 1.8–7.4)
NEUTROPHILS NFR BLD AUTO: 64.8 % — SIGNIFICANT CHANGE UP (ref 43–77)
NRBC # BLD: 0 /100 WBCS — SIGNIFICANT CHANGE UP (ref 0–0)
PLATELET # BLD AUTO: 157 K/UL — SIGNIFICANT CHANGE UP (ref 150–400)
POTASSIUM SERPL-MCNC: 3.6 MMOL/L — SIGNIFICANT CHANGE UP (ref 3.5–5.3)
POTASSIUM SERPL-SCNC: 3.6 MMOL/L — SIGNIFICANT CHANGE UP (ref 3.5–5.3)
RBC # BLD: 4.16 M/UL — LOW (ref 4.2–5.8)
RBC # FLD: 13 % — SIGNIFICANT CHANGE UP (ref 10.3–14.5)
SALICYLATES SERPL-MCNC: <1.7 MG/DL — LOW (ref 2.8–20)
SODIUM SERPL-SCNC: 141 MMOL/L — SIGNIFICANT CHANGE UP (ref 135–145)
WBC # BLD: 5.67 K/UL — SIGNIFICANT CHANGE UP (ref 3.8–10.5)
WBC # FLD AUTO: 5.67 K/UL — SIGNIFICANT CHANGE UP (ref 3.8–10.5)

## 2019-08-21 PROCEDURE — 96360 HYDRATION IV INFUSION INIT: CPT

## 2019-08-21 PROCEDURE — 36415 COLL VENOUS BLD VENIPUNCTURE: CPT

## 2019-08-21 PROCEDURE — 85027 COMPLETE CBC AUTOMATED: CPT

## 2019-08-21 PROCEDURE — 93005 ELECTROCARDIOGRAM TRACING: CPT

## 2019-08-21 PROCEDURE — 99285 EMERGENCY DEPT VISIT HI MDM: CPT

## 2019-08-21 PROCEDURE — 82962 GLUCOSE BLOOD TEST: CPT

## 2019-08-21 PROCEDURE — 80048 BASIC METABOLIC PNL TOTAL CA: CPT

## 2019-08-21 PROCEDURE — 99285 EMERGENCY DEPT VISIT HI MDM: CPT | Mod: 25

## 2019-08-21 PROCEDURE — 80307 DRUG TEST PRSMV CHEM ANLYZR: CPT

## 2019-08-21 RX ORDER — SODIUM CHLORIDE 9 MG/ML
1000 INJECTION INTRAMUSCULAR; INTRAVENOUS; SUBCUTANEOUS ONCE
Refills: 0 | Status: COMPLETED | OUTPATIENT
Start: 2019-08-21 | End: 2019-08-21

## 2019-08-21 RX ADMIN — SODIUM CHLORIDE 1000 MILLILITER(S): 9 INJECTION INTRAMUSCULAR; INTRAVENOUS; SUBCUTANEOUS at 13:52

## 2019-08-21 RX ADMIN — SODIUM CHLORIDE 1000 MILLILITER(S): 9 INJECTION INTRAMUSCULAR; INTRAVENOUS; SUBCUTANEOUS at 14:52

## 2019-08-21 NOTE — ED ADULT TRIAGE NOTE - CHIEF COMPLAINT QUOTE
as per  patient was recently diagnosed with DM and he stopped taking his metformin x 15 days ago because he drinks alcohol everyday. Patient complaining of dizziness and weakness. No chest pains or shortness of breath. as per  patient was recently diagnosed with DM and he stopped taking his metformin x 15 days ago because he drinks alcohol everyday. last drink was 3 hours ago. Patient complaining of dizziness and weakness. No chest pains or shortness of breath.

## 2019-08-21 NOTE — ED ADULT NURSE NOTE - NSIMPLEMENTINTERV_GEN_ALL_ED
Implemented All Fall with Harm Risk Interventions:  [place] to call system. Call bell, personal items and telephone within reach. Instruct patient to call for assistance. Room bathroom lighting operational. Non-slip footwear when patient is off stretcher. Physically safe environment: no spills, clutter or unnecessary equipment. Stretcher in lowest position, wheels locked, appropriate side rails in place. Provide visual cue, wrist band, yellow gown, etc. Monitor gait and stability. Monitor for mental status changes and reorient to person, place, and time. Review medications for side effects contributing to fall risk. Reinforce activity limits and safety measures with patient and family. Provide visual clues: red socks.

## 2019-08-21 NOTE — ED PROVIDER NOTE - CLINICAL SUMMARY MEDICAL DECISION MAKING FREE TEXT BOX
Patient with alcohol abuse, not taking his diabetes medicine. Patient informed he can take his medicines when drinking as long as he eats food. Patient clinically sober in ED. To f/u with PMD in 1-2 days. Return to the ED immediately if getting worse, not improving, or if having any new or troubling symptoms. No DKA, no SI/HI.

## 2019-08-21 NOTE — ED ADULT NURSE NOTE - OBJECTIVE STATEMENT
Came for alcohol intoxication walk in. Asking for help to quite drinking. Has been drinking vodka for the past 15 days straight. Pt states tried quitting but starts to shake.

## 2019-08-21 NOTE — ED PROVIDER NOTE - CARE PLAN
Principal Discharge DX:	Alcoholic intoxication without complication  Secondary Diagnosis:	Hyperglycemia

## 2019-08-21 NOTE — ED ADULT NURSE NOTE - CHIEF COMPLAINT QUOTE
as per  patient was recently diagnosed with DM and he stopped taking his metformin x 15 days ago because he drinks alcohol everyday. last drink was 3 hours ago. Patient complaining of dizziness and weakness. No chest pains or shortness of breath.

## 2019-08-21 NOTE — CHART NOTE - NSCHARTNOTEFT_GEN_A_CORE
Patient is a 64 year old, predominantly Nepalese speaking, came to [facility] for alcohol intoxication. Patient referred to SW for alcohol treatment resources as per his request.   Patient's chart was reviewed, previous medical history includes: HTN, DM, and ETOH dependence.  Patient last assessed by [facility] unit SWer July 2019 in which patient was receptive to obtaining resources but no further SW intervention otherwise.  ED SWer met with patient at bedside; role of SW was explained.  Patient predominantly Nepalese speaking, understands and can verbalize some English.  The following resources were provided to patient in Nepalese: Information (address, contact name and phone number) to [facility] in [place]: an inpatient drug rehab that accommodates Nepalese speaking only patients referred to at the [facility]” with Nepalese speaking therapists/staff; list of all Nepalese Speaking AA meetings in [place] in Nepalese and walk in information to detox treatment for alcohol treatment in Nepalese.  Patient expressed understanding, expressed no outstanding questions or concerns and declined for  services to discuss additional treatment options.  Patient socially cleared for d/c; medical team assigned ot patient’s care made aware.

## 2019-08-21 NOTE — ED PROVIDER NOTE - PROGRESS NOTE DETAILS
Patient resting comfortably, feels markedly improved. Wants to speak to SW about alcohol treatment. I spoke to Heidi, who will go see him. Signed out to Dr. Begum, who will d/c after SW consult. Patient resting comfortably, feels markedly improved. Wants to speak to SW about alcohol treatment. I spoke to Heidi, who will go see him. Seen by SW, given resources.

## 2019-09-04 ENCOUNTER — EMERGENCY (EMERGENCY)
Facility: HOSPITAL | Age: 64
LOS: 1 days | Discharge: HOME | End: 2019-09-04
Attending: EMERGENCY MEDICINE
Payer: SELF-PAY

## 2019-09-04 VITALS
HEIGHT: 64 IN | DIASTOLIC BLOOD PRESSURE: 82 MMHG | RESPIRATION RATE: 16 BRPM | HEART RATE: 108 BPM | SYSTOLIC BLOOD PRESSURE: 142 MMHG | OXYGEN SATURATION: 95 % | WEIGHT: 139.99 LBS | TEMPERATURE: 99 F

## 2019-09-04 VITALS
DIASTOLIC BLOOD PRESSURE: 70 MMHG | OXYGEN SATURATION: 98 % | HEART RATE: 90 BPM | SYSTOLIC BLOOD PRESSURE: 123 MMHG | RESPIRATION RATE: 18 BRPM

## 2019-09-04 LAB
ALBUMIN SERPL ELPH-MCNC: 3.6 G/DL — SIGNIFICANT CHANGE UP (ref 3.5–5)
ALP SERPL-CCNC: 98 U/L — SIGNIFICANT CHANGE UP (ref 40–120)
ALT FLD-CCNC: 80 U/L DA — HIGH (ref 10–60)
ANION GAP SERPL CALC-SCNC: 19 MMOL/L — HIGH (ref 5–17)
AST SERPL-CCNC: 87 U/L — HIGH (ref 10–40)
BILIRUB SERPL-MCNC: 1.1 MG/DL — SIGNIFICANT CHANGE UP (ref 0.2–1.2)
BUN SERPL-MCNC: 15 MG/DL — SIGNIFICANT CHANGE UP (ref 7–18)
CALCIUM SERPL-MCNC: 8.4 MG/DL — SIGNIFICANT CHANGE UP (ref 8.4–10.5)
CHLORIDE SERPL-SCNC: 95 MMOL/L — LOW (ref 96–108)
CO2 SERPL-SCNC: 20 MMOL/L — LOW (ref 22–31)
CREAT SERPL-MCNC: 0.77 MG/DL — SIGNIFICANT CHANGE UP (ref 0.5–1.3)
GLUCOSE SERPL-MCNC: 161 MG/DL — HIGH (ref 70–99)
HCT VFR BLD CALC: 39.4 % — SIGNIFICANT CHANGE UP (ref 39–50)
HGB BLD-MCNC: 13.8 G/DL — SIGNIFICANT CHANGE UP (ref 13–17)
MCHC RBC-ENTMCNC: 35 GM/DL — SIGNIFICANT CHANGE UP (ref 32–36)
MCHC RBC-ENTMCNC: 35.2 PG — HIGH (ref 27–34)
MCV RBC AUTO: 100.5 FL — HIGH (ref 80–100)
NRBC # BLD: 0 /100 WBCS — SIGNIFICANT CHANGE UP (ref 0–0)
PLATELET # BLD AUTO: 212 K/UL — SIGNIFICANT CHANGE UP (ref 150–400)
POTASSIUM SERPL-MCNC: 4.2 MMOL/L — SIGNIFICANT CHANGE UP (ref 3.5–5.3)
POTASSIUM SERPL-SCNC: 4.2 MMOL/L — SIGNIFICANT CHANGE UP (ref 3.5–5.3)
PROT SERPL-MCNC: 8.6 G/DL — HIGH (ref 6–8.3)
RBC # BLD: 3.92 M/UL — LOW (ref 4.2–5.8)
RBC # FLD: 12.7 % — SIGNIFICANT CHANGE UP (ref 10.3–14.5)
SODIUM SERPL-SCNC: 134 MMOL/L — LOW (ref 135–145)
TROPONIN I SERPL-MCNC: <0.015 NG/ML — SIGNIFICANT CHANGE UP (ref 0–0.04)
WBC # BLD: 7.33 K/UL — SIGNIFICANT CHANGE UP (ref 3.8–10.5)
WBC # FLD AUTO: 7.33 K/UL — SIGNIFICANT CHANGE UP (ref 3.8–10.5)

## 2019-09-04 PROCEDURE — 85027 COMPLETE CBC AUTOMATED: CPT

## 2019-09-04 PROCEDURE — 71046 X-RAY EXAM CHEST 2 VIEWS: CPT | Mod: 26

## 2019-09-04 PROCEDURE — 96374 THER/PROPH/DIAG INJ IV PUSH: CPT

## 2019-09-04 PROCEDURE — 71046 X-RAY EXAM CHEST 2 VIEWS: CPT

## 2019-09-04 PROCEDURE — 93005 ELECTROCARDIOGRAM TRACING: CPT

## 2019-09-04 PROCEDURE — 80053 COMPREHEN METABOLIC PANEL: CPT

## 2019-09-04 PROCEDURE — 99285 EMERGENCY DEPT VISIT HI MDM: CPT

## 2019-09-04 PROCEDURE — 84484 ASSAY OF TROPONIN QUANT: CPT

## 2019-09-04 PROCEDURE — 36415 COLL VENOUS BLD VENIPUNCTURE: CPT

## 2019-09-04 PROCEDURE — 82962 GLUCOSE BLOOD TEST: CPT

## 2019-09-04 PROCEDURE — 99284 EMERGENCY DEPT VISIT MOD MDM: CPT | Mod: 25

## 2019-09-04 RX ORDER — SODIUM CHLORIDE 9 MG/ML
1000 INJECTION INTRAMUSCULAR; INTRAVENOUS; SUBCUTANEOUS ONCE
Refills: 0 | Status: COMPLETED | OUTPATIENT
Start: 2019-09-04 | End: 2019-09-04

## 2019-09-04 RX ORDER — ONDANSETRON 8 MG/1
4 TABLET, FILM COATED ORAL ONCE
Refills: 0 | Status: COMPLETED | OUTPATIENT
Start: 2019-09-04 | End: 2019-09-04

## 2019-09-04 RX ADMIN — SODIUM CHLORIDE 1000 MILLILITER(S): 9 INJECTION INTRAMUSCULAR; INTRAVENOUS; SUBCUTANEOUS at 20:17

## 2019-09-04 RX ADMIN — ONDANSETRON 4 MILLIGRAM(S): 8 TABLET, FILM COATED ORAL at 20:17

## 2019-09-04 NOTE — ED PROVIDER NOTE - PATIENT PORTAL LINK FT
You can access the FollowMyHealth Patient Portal offered by [facility] by registering at the following website: [email or web address]. By joining [employer]’s FollowMyHealth portal, you will also be able to view your health information using other applications (apps) compatible with our system.

## 2019-09-04 NOTE — ED PROVIDER NOTE - OBJECTIVE STATEMENT
65 y/o M patient with a significant PMHx of DM, HTN and with no significant PSHx presents to the ED for chest pain. Patient states he was drinking alcohol when he experienced nausea and chest pain. Patient states he has hiccups and currently no pain right now. Patient reports drinking everyday and states he started to feel dizzy. Patient denies any belly pain, dysuria, history of heart attack or stroke or any other complaints. [place]  for Mongolian: Cody- [phone]. NKDA. 65 y/o M patient with a significant PMHx of DM, HTN presents to the ED for chest pain. Patient states he was drinking alcohol when he experienced nausea and chest pain. Patient states he has hiccups and currently no pain right now. States pain was transient and occurred while drinking. Patient reports daily drinking. Denies trauma, fevers, n/v/d, SOB, abd pain dysuria, hematuria, history of heart attack or stroke or any other complaints. [place]  for Turkmen: Cody- [phone]. NKDA.

## 2019-09-04 NOTE — ED PROVIDER NOTE - NSFOLLOWUPINSTRUCTIONS_ED_ALL_ED_FT
You were seen today for your chest discomfort after drinking alcohol. Your blood work, EKG, Chest X-Ray did not show acute abnormalities. Please refrain from drinking. Please follow up with your primary care doctor. Please return to the Emergency Department for worsening signs or symptoms.

## 2019-09-04 NOTE — CHART NOTE - NSCHARTNOTEFT_GEN_A_CORE
Pt is a 64 year old male with  PMHx of DM, HTN. Pt came to the ED for chest pain. Patient stated he was drinking alcohol when he experienced nausea and chest pain as per Physician's note. Pt refused to speak to Swker when she approached him. He said in Citizen of [place] that he had to leave.  Pt collected his d/c papers and walked out. Pt is uninsured. Referral made to Finance dept for assistance.

## 2019-09-05 PROBLEM — E11.9 TYPE 2 DIABETES MELLITUS WITHOUT COMPLICATIONS: Chronic | Status: ACTIVE | Noted: 2019-08-21

## 2020-01-16 ENCOUNTER — INPATIENT (INPATIENT)
Facility: HOSPITAL | Age: 65
LOS: 4 days | Discharge: HOME | DRG: 897 | End: 2020-01-21
Attending: INTERNAL MEDICINE | Admitting: INTERNAL MEDICINE
Payer: MEDICAID

## 2020-01-16 VITALS
TEMPERATURE: 97 F | OXYGEN SATURATION: 94 % | DIASTOLIC BLOOD PRESSURE: 65 MMHG | RESPIRATION RATE: 19 BRPM | WEIGHT: 139.99 LBS | SYSTOLIC BLOOD PRESSURE: 142 MMHG | HEIGHT: 60 IN | HEART RATE: 94 BPM

## 2020-01-16 DIAGNOSIS — E11.9 TYPE 2 DIABETES MELLITUS WITHOUT COMPLICATIONS: ICD-10-CM

## 2020-01-16 DIAGNOSIS — F10.20 ALCOHOL DEPENDENCE, UNCOMPLICATED: ICD-10-CM

## 2020-01-16 DIAGNOSIS — R11.10 VOMITING, UNSPECIFIED: ICD-10-CM

## 2020-01-16 DIAGNOSIS — R10.9 UNSPECIFIED ABDOMINAL PAIN: ICD-10-CM

## 2020-01-16 DIAGNOSIS — E87.2 ACIDOSIS: ICD-10-CM

## 2020-01-16 DIAGNOSIS — Z29.9 ENCOUNTER FOR PROPHYLACTIC MEASURES, UNSPECIFIED: ICD-10-CM

## 2020-01-16 LAB
ACETONE SERPL-MCNC: ABNORMAL
ALBUMIN SERPL ELPH-MCNC: 3.3 G/DL — LOW (ref 3.5–5)
ALBUMIN SERPL ELPH-MCNC: 4.3 G/DL — SIGNIFICANT CHANGE UP (ref 3.5–5)
ALP SERPL-CCNC: 105 U/L — SIGNIFICANT CHANGE UP (ref 40–120)
ALP SERPL-CCNC: 81 U/L — SIGNIFICANT CHANGE UP (ref 40–120)
ALT FLD-CCNC: 130 U/L DA — HIGH (ref 10–60)
ALT FLD-CCNC: 167 U/L DA — HIGH (ref 10–60)
ANION GAP SERPL CALC-SCNC: 14 MMOL/L — SIGNIFICANT CHANGE UP (ref 5–17)
ANION GAP SERPL CALC-SCNC: 18 MMOL/L — HIGH (ref 5–17)
ANION GAP SERPL CALC-SCNC: 24 MMOL/L — HIGH (ref 5–17)
APPEARANCE UR: CLEAR — SIGNIFICANT CHANGE UP
APTT BLD: 26.8 SEC — LOW (ref 27.5–36.3)
AST SERPL-CCNC: 167 U/L — HIGH (ref 10–40)
AST SERPL-CCNC: 227 U/L — HIGH (ref 10–40)
BASE EXCESS BLDA CALC-SCNC: -5.9 MMOL/L — LOW (ref -2–2)
BASOPHILS # BLD AUTO: 0.01 K/UL — SIGNIFICANT CHANGE UP (ref 0–0.2)
BASOPHILS NFR BLD AUTO: 0.2 % — SIGNIFICANT CHANGE UP (ref 0–2)
BILIRUB DIRECT SERPL-MCNC: 0.6 MG/DL — HIGH (ref 0–0.2)
BILIRUB DIRECT SERPL-MCNC: 0.8 MG/DL — HIGH (ref 0–0.2)
BILIRUB INDIRECT FLD-MCNC: 0.7 MG/DL — SIGNIFICANT CHANGE UP (ref 0.2–1)
BILIRUB INDIRECT FLD-MCNC: 0.9 MG/DL — SIGNIFICANT CHANGE UP (ref 0.2–1)
BILIRUB SERPL-MCNC: 1.3 MG/DL — HIGH (ref 0.2–1.2)
BILIRUB SERPL-MCNC: 1.7 MG/DL — HIGH (ref 0.2–1.2)
BILIRUB SERPL-MCNC: 1.7 MG/DL — HIGH (ref 0.2–1.2)
BILIRUB UR-MCNC: NEGATIVE — SIGNIFICANT CHANGE UP
BLOOD GAS COMMENTS ARTERIAL: SIGNIFICANT CHANGE UP
BUN SERPL-MCNC: 21 MG/DL — HIGH (ref 7–18)
BUN SERPL-MCNC: 31 MG/DL — HIGH (ref 7–18)
BUN SERPL-MCNC: 36 MG/DL — HIGH (ref 7–18)
CALCIUM SERPL-MCNC: 7.3 MG/DL — LOW (ref 8.4–10.5)
CALCIUM SERPL-MCNC: 7.4 MG/DL — LOW (ref 8.4–10.5)
CALCIUM SERPL-MCNC: 8.7 MG/DL — SIGNIFICANT CHANGE UP (ref 8.4–10.5)
CHLORIDE SERPL-SCNC: 101 MMOL/L — SIGNIFICANT CHANGE UP (ref 96–108)
CHLORIDE SERPL-SCNC: 102 MMOL/L — SIGNIFICANT CHANGE UP (ref 96–108)
CHLORIDE SERPL-SCNC: 90 MMOL/L — LOW (ref 96–108)
CO2 SERPL-SCNC: 18 MMOL/L — LOW (ref 22–31)
CO2 SERPL-SCNC: 18 MMOL/L — LOW (ref 22–31)
CO2 SERPL-SCNC: 20 MMOL/L — LOW (ref 22–31)
COLOR SPEC: YELLOW — SIGNIFICANT CHANGE UP
CREAT SERPL-MCNC: 0.86 MG/DL — SIGNIFICANT CHANGE UP (ref 0.5–1.3)
CREAT SERPL-MCNC: 0.91 MG/DL — SIGNIFICANT CHANGE UP (ref 0.5–1.3)
CREAT SERPL-MCNC: 1.23 MG/DL — SIGNIFICANT CHANGE UP (ref 0.5–1.3)
DIFF PNL FLD: ABNORMAL
EOSINOPHIL # BLD AUTO: 0 K/UL — SIGNIFICANT CHANGE UP (ref 0–0.5)
EOSINOPHIL NFR BLD AUTO: 0 % — SIGNIFICANT CHANGE UP (ref 0–6)
ETHANOL SERPL-MCNC: 299 MG/DL — HIGH (ref 0–10)
GLUCOSE BLDC GLUCOMTR-MCNC: 278 MG/DL — HIGH (ref 70–99)
GLUCOSE SERPL-MCNC: 226 MG/DL — HIGH (ref 70–99)
GLUCOSE SERPL-MCNC: 245 MG/DL — HIGH (ref 70–99)
GLUCOSE SERPL-MCNC: 390 MG/DL — HIGH (ref 70–99)
GLUCOSE UR QL: NEGATIVE — SIGNIFICANT CHANGE UP
HCO3 BLDA-SCNC: 17 MMOL/L — LOW (ref 23–27)
HCT VFR BLD CALC: 39.7 % — SIGNIFICANT CHANGE UP (ref 39–50)
HGB BLD-MCNC: 14.3 G/DL — SIGNIFICANT CHANGE UP (ref 13–17)
HOROWITZ INDEX BLDA+IHG-RTO: 21 — SIGNIFICANT CHANGE UP
IMM GRANULOCYTES NFR BLD AUTO: 0.4 % — SIGNIFICANT CHANGE UP (ref 0–1.5)
INR BLD: 1.08 RATIO — SIGNIFICANT CHANGE UP (ref 0.88–1.16)
KETONES UR-MCNC: NEGATIVE — SIGNIFICANT CHANGE UP
LACTATE SERPL-SCNC: 1.6 MMOL/L — SIGNIFICANT CHANGE UP (ref 0.7–2)
LACTATE SERPL-SCNC: 3 MMOL/L — HIGH (ref 0.7–2)
LACTATE SERPL-SCNC: 5.8 MMOL/L — CRITICAL HIGH (ref 0.7–2)
LEUKOCYTE ESTERASE UR-ACNC: ABNORMAL
LIDOCAIN IGE QN: 347 U/L — SIGNIFICANT CHANGE UP (ref 73–393)
LYMPHOCYTES # BLD AUTO: 0.97 K/UL — LOW (ref 1–3.3)
LYMPHOCYTES # BLD AUTO: 21.2 % — SIGNIFICANT CHANGE UP (ref 13–44)
MAGNESIUM SERPL-MCNC: 2.1 MG/DL — SIGNIFICANT CHANGE UP (ref 1.6–2.6)
MAGNESIUM SERPL-MCNC: 2.1 MG/DL — SIGNIFICANT CHANGE UP (ref 1.6–2.6)
MCHC RBC-ENTMCNC: 34.6 PG — HIGH (ref 27–34)
MCHC RBC-ENTMCNC: 36 GM/DL — SIGNIFICANT CHANGE UP (ref 32–36)
MCV RBC AUTO: 96.1 FL — SIGNIFICANT CHANGE UP (ref 80–100)
MONOCYTES # BLD AUTO: 0.19 K/UL — SIGNIFICANT CHANGE UP (ref 0–0.9)
MONOCYTES NFR BLD AUTO: 4.1 % — SIGNIFICANT CHANGE UP (ref 2–14)
NEUTROPHILS # BLD AUTO: 3.39 K/UL — SIGNIFICANT CHANGE UP (ref 1.8–7.4)
NEUTROPHILS NFR BLD AUTO: 74.1 % — SIGNIFICANT CHANGE UP (ref 43–77)
NITRITE UR-MCNC: NEGATIVE — SIGNIFICANT CHANGE UP
NRBC # BLD: 0 /100 WBCS — SIGNIFICANT CHANGE UP (ref 0–0)
PCO2 BLDA: 28 MMHG — LOW (ref 32–46)
PCP SPEC-MCNC: SIGNIFICANT CHANGE UP
PH BLDA: 7.41 — SIGNIFICANT CHANGE UP (ref 7.35–7.45)
PH UR: 6 — SIGNIFICANT CHANGE UP (ref 5–8)
PHOSPHATE SERPL-MCNC: 1.2 MG/DL — LOW (ref 2.5–4.5)
PHOSPHATE SERPL-MCNC: 2.1 MG/DL — LOW (ref 2.5–4.5)
PLATELET # BLD AUTO: 106 K/UL — LOW (ref 150–400)
PO2 BLDA: 66 MMHG — LOW (ref 74–108)
POTASSIUM SERPL-MCNC: 3.4 MMOL/L — LOW (ref 3.5–5.3)
POTASSIUM SERPL-MCNC: 3.7 MMOL/L — SIGNIFICANT CHANGE UP (ref 3.5–5.3)
POTASSIUM SERPL-MCNC: 3.9 MMOL/L — SIGNIFICANT CHANGE UP (ref 3.5–5.3)
POTASSIUM SERPL-SCNC: 3.4 MMOL/L — LOW (ref 3.5–5.3)
POTASSIUM SERPL-SCNC: 3.7 MMOL/L — SIGNIFICANT CHANGE UP (ref 3.5–5.3)
POTASSIUM SERPL-SCNC: 3.9 MMOL/L — SIGNIFICANT CHANGE UP (ref 3.5–5.3)
PROT SERPL-MCNC: 7 G/DL — SIGNIFICANT CHANGE UP (ref 6–8.3)
PROT SERPL-MCNC: 9.1 G/DL — HIGH (ref 6–8.3)
PROT UR-MCNC: NEGATIVE — SIGNIFICANT CHANGE UP
PROTHROM AB SERPL-ACNC: 12 SEC — SIGNIFICANT CHANGE UP (ref 10–12.9)
RBC # BLD: 4.13 M/UL — LOW (ref 4.2–5.8)
RBC # FLD: 11.8 % — SIGNIFICANT CHANGE UP (ref 10.3–14.5)
SAO2 % BLDA: 92 % — SIGNIFICANT CHANGE UP (ref 92–96)
SODIUM SERPL-SCNC: 132 MMOL/L — LOW (ref 135–145)
SODIUM SERPL-SCNC: 135 MMOL/L — SIGNIFICANT CHANGE UP (ref 135–145)
SODIUM SERPL-SCNC: 138 MMOL/L — SIGNIFICANT CHANGE UP (ref 135–145)
SP GR SPEC: 1 — LOW (ref 1.01–1.02)
TROPONIN I SERPL-MCNC: <0.015 NG/ML — SIGNIFICANT CHANGE UP (ref 0–0.04)
UROBILINOGEN FLD QL: NEGATIVE — SIGNIFICANT CHANGE UP
WBC # BLD: 4.58 K/UL — SIGNIFICANT CHANGE UP (ref 3.8–10.5)
WBC # FLD AUTO: 4.58 K/UL — SIGNIFICANT CHANGE UP (ref 3.8–10.5)

## 2020-01-16 PROCEDURE — 74177 CT ABD & PELVIS W/CONTRAST: CPT | Mod: 26

## 2020-01-16 PROCEDURE — 70450 CT HEAD/BRAIN W/O DYE: CPT | Mod: 26

## 2020-01-16 PROCEDURE — 99285 EMERGENCY DEPT VISIT HI MDM: CPT

## 2020-01-16 PROCEDURE — 71045 X-RAY EXAM CHEST 1 VIEW: CPT | Mod: 26

## 2020-01-16 PROCEDURE — 93010 ELECTROCARDIOGRAM REPORT: CPT

## 2020-01-16 RX ORDER — THIAMINE MONONITRATE (VIT B1) 100 MG
500 TABLET ORAL EVERY 8 HOURS
Refills: 0 | Status: COMPLETED | OUTPATIENT
Start: 2020-01-16 | End: 2020-01-18

## 2020-01-16 RX ORDER — SODIUM CHLORIDE 9 MG/ML
1000 INJECTION, SOLUTION INTRAVENOUS
Refills: 0 | Status: DISCONTINUED | OUTPATIENT
Start: 2020-01-16 | End: 2020-01-16

## 2020-01-16 RX ORDER — ONDANSETRON 8 MG/1
4 TABLET, FILM COATED ORAL EVERY 4 HOURS
Refills: 0 | Status: DISCONTINUED | OUTPATIENT
Start: 2020-01-16 | End: 2020-01-16

## 2020-01-16 RX ORDER — METOCLOPRAMIDE HCL 10 MG
10 TABLET ORAL ONCE
Refills: 0 | Status: COMPLETED | OUTPATIENT
Start: 2020-01-16 | End: 2020-01-16

## 2020-01-16 RX ORDER — ONDANSETRON 8 MG/1
4 TABLET, FILM COATED ORAL ONCE
Refills: 0 | Status: COMPLETED | OUTPATIENT
Start: 2020-01-16 | End: 2020-01-16

## 2020-01-16 RX ORDER — POTASSIUM CHLORIDE 20 MEQ
40 PACKET (EA) ORAL EVERY 4 HOURS
Refills: 0 | Status: COMPLETED | OUTPATIENT
Start: 2020-01-16 | End: 2020-01-17

## 2020-01-16 RX ORDER — SODIUM CHLORIDE 9 MG/ML
1000 INJECTION INTRAMUSCULAR; INTRAVENOUS; SUBCUTANEOUS ONCE
Refills: 0 | Status: COMPLETED | OUTPATIENT
Start: 2020-01-16 | End: 2020-01-16

## 2020-01-16 RX ORDER — HALOPERIDOL DECANOATE 100 MG/ML
2.5 INJECTION INTRAMUSCULAR ONCE
Refills: 0 | Status: COMPLETED | OUTPATIENT
Start: 2020-01-16 | End: 2020-01-16

## 2020-01-16 RX ORDER — ONDANSETRON 8 MG/1
4 TABLET, FILM COATED ORAL EVERY 4 HOURS
Refills: 0 | Status: DISCONTINUED | OUTPATIENT
Start: 2020-01-16 | End: 2020-01-21

## 2020-01-16 RX ORDER — POTASSIUM PHOSPHATE, MONOBASIC POTASSIUM PHOSPHATE, DIBASIC 236; 224 MG/ML; MG/ML
15 INJECTION, SOLUTION INTRAVENOUS ONCE
Refills: 0 | Status: COMPLETED | OUTPATIENT
Start: 2020-01-16 | End: 2020-01-16

## 2020-01-16 RX ORDER — SODIUM CHLORIDE 9 MG/ML
1000 INJECTION, SOLUTION INTRAVENOUS
Refills: 0 | Status: DISCONTINUED | OUTPATIENT
Start: 2020-01-16 | End: 2020-01-21

## 2020-01-16 RX ORDER — MULTIVIT-MIN/FERROUS GLUCONATE 9 MG/15 ML
1 LIQUID (ML) ORAL DAILY
Refills: 0 | Status: DISCONTINUED | OUTPATIENT
Start: 2020-01-16 | End: 2020-01-21

## 2020-01-16 RX ORDER — FOLIC ACID 0.8 MG
1 TABLET ORAL DAILY
Refills: 0 | Status: DISCONTINUED | OUTPATIENT
Start: 2020-01-16 | End: 2020-01-21

## 2020-01-16 RX ADMIN — SODIUM CHLORIDE 100 MILLILITER(S): 9 INJECTION, SOLUTION INTRAVENOUS at 17:57

## 2020-01-16 RX ADMIN — HALOPERIDOL DECANOATE 2.5 MILLIGRAM(S): 100 INJECTION INTRAMUSCULAR at 15:22

## 2020-01-16 RX ADMIN — Medication 105 MILLIGRAM(S): at 15:21

## 2020-01-16 RX ADMIN — ONDANSETRON 4 MILLIGRAM(S): 8 TABLET, FILM COATED ORAL at 22:40

## 2020-01-16 RX ADMIN — Medication 104 MILLIGRAM(S): at 12:41

## 2020-01-16 RX ADMIN — Medication 2 MILLIGRAM(S): at 22:50

## 2020-01-16 RX ADMIN — SODIUM CHLORIDE 1000 MILLILITER(S): 9 INJECTION INTRAMUSCULAR; INTRAVENOUS; SUBCUTANEOUS at 07:59

## 2020-01-16 RX ADMIN — POTASSIUM PHOSPHATE, MONOBASIC POTASSIUM PHOSPHATE, DIBASIC 62.5 MILLIMOLE(S): 236; 224 INJECTION, SOLUTION INTRAVENOUS at 23:22

## 2020-01-16 RX ADMIN — SODIUM CHLORIDE 200 MILLILITER(S): 9 INJECTION, SOLUTION INTRAVENOUS at 12:41

## 2020-01-16 RX ADMIN — Medication 40 MILLIEQUIVALENT(S): at 23:01

## 2020-01-16 RX ADMIN — Medication 1 MILLIGRAM(S): at 17:57

## 2020-01-16 RX ADMIN — ONDANSETRON 4 MILLIGRAM(S): 8 TABLET, FILM COATED ORAL at 10:06

## 2020-01-16 RX ADMIN — Medication 105 MILLIGRAM(S): at 22:52

## 2020-01-16 RX ADMIN — ONDANSETRON 4 MILLIGRAM(S): 8 TABLET, FILM COATED ORAL at 07:59

## 2020-01-16 NOTE — H&P ADULT - NSHPREVIEWOFSYSTEMS_GEN_ALL_CORE
REVIEW OF SYSTEMS:    CONSTITUTIONAL: No weakness, fevers or chills  EYES/ENT: No visual changes;  No vertigo or throat pain   NECK: No pain or stiffness  RESPIRATORY: No cough, wheezing, hemoptysis; No shortness of breath  CARDIOVASCULAR: No chest pain or palpitations  GASTROINTESTINAL: No abdominal or epigastric pain. No nausea, vomiting, or hematemesis; No diarrhea or constipation. No melena or hematochezia.  GENITOURINARY: No dysuria, frequency or hematuria  NEUROLOGICAL: No numbness or weakness  SKIN: No itching, rashes REVIEW OF SYSTEMS:    CONSTITUTIONAL: + weakness  EYES/ENT: No visual changes;  No vertigo or throat pain   NECK: No pain or stiffness  RESPIRATORY: No cough, wheezing, hemoptysis; No shortness of breath  CARDIOVASCULAR: No chest pain  GASTROINTESTINAL: + epigastric pain, nausea and vomiting  GENITOURINARY: No dysuria, frequency or hematuria  NEUROLOGICAL: No numbness or weakness  SKIN: No itching, rashes

## 2020-01-16 NOTE — PROGRESS NOTE ADULT - SUBJECTIVE AND OBJECTIVE BOX
HPI:      Patient is a 64y old  Male who presents with a chief complaint of     INTERVAL HPI/OVERNIGHT EVENTS:  T(C): 37 (20 @ 11:19), Max: 37 (20 @ 11:19)  HR: 95 (20 @ 11:19) (94 - 95)  BP: 147/77 (20 @ 11:19) (141/76 - 147/77)  RR: 18 (20 @ 11:19) (18 - 19)  SpO2: 99% (20 @ 11:19) (94% - 99%)  Wt(kg): --  I&O's Summary      REVIEW OF SYSTEMS: denies fever, chills, SOB, palpitations, chest pain, abdominal pain, nausea, vomitting, diarrhea, constipation, dizziness    MEDICATIONS  (STANDING):  dextrose 5% + sodium chloride 0.45%. 1000 milliLiter(s) (200 mL/Hr) IV Continuous <Continuous>  folic acid Injectable 1 milliGRAM(s) IV Push daily  haloperidol    Injectable 2.5 milliGRAM(s) IV Push once  thiamine IVPB 500 milliGRAM(s) IV Intermittent every 8 hours    MEDICATIONS  (PRN):      PHYSICAL EXAM:  GENERAL: NAD, well-groomed, well-developed  HEAD:  Atraumatic, Normocephalic  EYES: EOMI, PERRLA, conjunctiva and sclera clear  ENMT: No tonsillar erythema, exudates, or enlargement; Moist mucous membranes, Good dentition, No lesions  NECK: Supple, No JVD, Normal thyroid  NERVOUS SYSTEM:  Alert & Oriented X3, Good concentration; Motor Strength 5/5 B/L upper and lower extremities; DTRs 2+ intact and symmetric  CHEST/LUNG: Clear to percussion bilaterally; No rales, rhonchi, wheezing, or rubs  HEART: Regular rate and rhythm; No murmurs, rubs, or gallops  ABDOMEN: Soft, Nontender, Nondistended; Bowel sounds present  EXTREMITIES:  2+ Peripheral Pulses, No clubbing, cyanosis, or edema  LYMPH: No lymphadenopathy noted  SKIN: No rashes or lesions  LABS:                        14.3   4.58  )-----------( 106      ( 2020 08:00 )             39.7         138  |  102  |  31<H>  ----------------------------<  226<H>  3.7   |  18<L>  |  0.86    Ca    7.3<L>      2020 12:37  Phos  2.1       Mg     2.1         TPro  7.0  /  Alb  3.3<L>  /  TBili  1.3<H>  /  DBili  0.6<H>  /  AST  167<H>  /  ALT  130<H>  /  AlkPhos  81      PT/INR - ( 2020 08:00 )   PT: 12.0 sec;   INR: 1.08 ratio         PTT - ( 2020 08:00 )  PTT:26.8 sec  Urinalysis Basic - ( 2020 13:18 )    Color: Yellow / Appearance: Clear / S.005 / pH: x  Gluc: x / Ketone: Negative  / Bili: Negative / Urobili: Negative   Blood: x / Protein: Negative / Nitrite: Negative   Leuk Esterase: Trace / RBC: x / WBC x   Sq Epi: x / Non Sq Epi: x / Bacteria: x      CAPILLARY BLOOD GLUCOSE      POCT Blood Glucose.: 264 mg/dL (2020 06:46)      ABG - ( 2020 12:17 )  pH, Arterial: 7.41  pH, Blood: x     /  pCO2: 28    /  pO2: 66    / HCO3: 17    / Base Excess: -5.9  /  SaO2: 92                Urinalysis Basic - ( 2020 13:18 )    Color: Yellow / Appearance: Clear / S.005 / pH: x  Gluc: x / Ketone: Negative  / Bili: Negative / Urobili: Negative   Blood: x / Protein: Negative / Nitrite: Negative   Leuk Esterase: Trace / RBC: x / WBC x   Sq Epi: x / Non Sq Epi: x / Bacteria: x

## 2020-01-16 NOTE — H&P ADULT - HISTORY OF PRESENT ILLNESS
63 y/o M pt with a PMHx of DM, ETOH dependence, presents to the ED with complaints of vomiting for several days. Notes he is unable to keep anything down but has been drinking alcohol everyday, last drink was yesterday. NKDA. Pt is a 63 y/o M pt with a PMHx of DM, ETOH dependence with last drink yesterday who presents to the ED with complaints of vomiting for several days. Notes he is unable to keep anything down but has been drinking alcohol everyday, last drink was yesterday. NKDA. Pt unable to provide much history stating that his nausea is very bad. Pt persistently vomiting in ED.

## 2020-01-16 NOTE — ED ADULT NURSE REASSESSMENT NOTE - NS ED NURSE REASSESS COMMENT FT1
received pt this am - in yellow gown roam alert , AOOB noted , pt has nausea, medicated as ordered , hob elevated  , close to ns station

## 2020-01-16 NOTE — H&P ADULT - ASSESSMENT
Pt is a 65 y/o M pt with a PMHx of DM, ETOH dependence with last drink yesterday who presents to the ED with complaints of vomiting for several days. Notes he is unable to keep anything down but has been drinking alcohol everyday, last drink was yesterday. NKDA. Pt unable to provide much history stating that his nausea is very bad. Pt persistently vomiting in ED. In ED Pt vitals were Vital Signs Last 24 Hrs  T(C): 37, HR: 95, BP: 147/77, RR: 18, SpO2: 99%  Pt was in significant distress due to vomiting and nausea, abd pain. Labs significant for Lac 5.8, transaminitis, Anion gap of 24 with bicarb 18, moderate acetone. ABG with normal PH. Pt is being admitted for alcoholic intoxication, Dehydration with ketonemia and intractable vomiting.

## 2020-01-16 NOTE — ED PROVIDER NOTE - OBJECTIVE STATEMENT
63 y/o M pt with a PMHx of DM, ETOH dependence, presents to the ED with complaints of vomiting for several days. Notes he is unable to keep anything down but has been drinking alcohol everyday, last drink was yesterday. NKDA.

## 2020-01-16 NOTE — ED ADULT TRIAGE NOTE - CHIEF COMPLAINT QUOTE
patient walked -in steady gait complaining of " too much pain ", pointing at epigastric  area, vomiting in waiting room bathroom

## 2020-01-16 NOTE — ED ADULT NURSE NOTE - OBJECTIVE STATEMENT
pt received fr triage appearing intoxicated, dressed in yellow gown, roam alert bracelet on right wrist, property inventoried and secured. pt placed close to Nurses' station. no noted distress, no noted injury. no vomiting. awaiting MD screening

## 2020-01-16 NOTE — CHART NOTE - NSCHARTNOTEFT_GEN_A_CORE
EVENT: ETOH withdrawal/vomiting:  Patient having multiple episodes of vomiting. On exam patient reporting slight headache, nausea and vomiting. [facility]  OBJECTIVE:  Vital Signs Last 24 Hrs  T(C): 36.6 (17 Jan 2020 01:09), Max: 37.1 (16 Jan 2020 23:11)  T(F): 97.9 (17 Jan 2020 01:09), Max: 98.8 (16 Jan 2020 23:11)  HR: 78 (17 Jan 2020 01:09) (78 - 96)  BP: 131/68 (17 Jan 2020 01:09) (131/68 - 147/77)  BP(mean): --  RR: 18 (17 Jan 2020 01:09) (18 - 19)  SpO2: 96% (17 Jan 2020 01:09) (94% - 100%)    FOCUSED PHYSICAL EXAM:    LABS:                        14.3   4.58  )-----------( 106      ( 16 Jan 2020 08:00 )             39.7   CARDIAC MARKERS ( 16 Jan 2020 08:00 )  <0.015 ng/mL / x     / x     / x     / x        01-16    135  |  101  |  21<H>  ----------------------------<  390<H>  3.4<L>   |  20<L>  |  0.91    Ca    7.4<L>      16 Jan 2020 20:15  Phos  1.2     01-16  Mg     2.1     01-16    TPro  7.0  /  Alb  3.3<L>  /  TBili  1.3<H>  /  DBili  0.6<H>  /  AST  167<H>  /  ALT  130<H>  /  AlkPhos  81  01-16      EKG:   IMGAGING:    ASSESSMENT:  HPI:  Pt is a 63 y/o M pt with a PMHx of DM, ETOH dependence with last drink yesterday who presents to the ED with complaints of vomiting for several days. Notes he is unable to keep anything down but has been drinking alcohol everyday, last drink was yesterday. NKDA. Pt unable to provide much history stating that his nausea is very bad. Pt persistently vomiting in ED. (16 Jan 2020 15:30)      PLAN:     FOLLOW UP / RESULT: EVENT: ETOH withdrawal/vomiting:  Patient having multiple episodes of vomiting. On exam patient reporting slight headache, nausea and vomiting. CIWA score 11. Ativan 2mg IVP prn for CIWA > 8 ordered. Ondansetron 4mg IVP Q4 PRN for vomiting ordered    FOCUSED PHYSICAL EXAM:A&OX3. Bilateral hand tremors noted. + Tongue fasciculations. Abdomen soft, non-tender, non-distended. Small to moderate non-bloody, dark coloured emesis/mucous noted in emesis bag.     LABS:                        14.3   4.58  )-----------( 106      ( 16 Jan 2020 08:00 )             39.7   CARDIAC MARKERS ( 16 Jan 2020 08:00 )  <0.015 ng/mL / x     / x     / x     / x        01-16    135  |  101  |  21<H>  ----------------------------<  390<H>  3.4<L>   |  20<L>  |  0.91    Ca    7.4<L>      16 Jan 2020 20:15  Phos  1.2     01-16  Mg     2.1     01-16    TPro  7.0  /  Alb  3.3<L>  /  TBili  1.3<H>  /  DBili  0.6<H>  /  AST  167<H>  /  ALT  130<H>  /  AlkPhos  81  01-16        ASSESSMENT:  HPI:  Pt is a 65 y/o M pt with a PMHx of DM, ETOH dependence with last drink yesterday who presents to the ED with complaints of vomiting for several days. Notes he is unable to keep anything down but has been drinking alcohol everyday, last drink was yesterday. NKDA. Pt unable to provide much history stating that his nausea is very bad. Pt persistently vomiting in ED. (16 Jan 2020 15:30)      PLAN: Nausea/vomiting 2/2 ETOH abuse and withdrawal  - Zofran 4mg IVP q4 PRN   - Lorazepam 2mg IVP Q4 PRN ordered for CIWA > 8. Remaining CIWA protocol previously ordered  - Consider addition of standing dose of Lorazepam if patient CIWA does not improve on PRN dosing    FOLLOW UP / RESULT:

## 2020-01-16 NOTE — H&P ADULT - NSHPPHYSICALEXAM_GEN_ALL_CORE
PHYSICAL EXAM:  GENERAL: NAD, speaks in full sentences, no signs of respiratory distress  HEAD:  Atraumatic, Normocephalic  EYES: EOMI, PERRLA, conjunctiva and sclera clear  NECK: Supple, No JVD  CHEST/LUNG: Clear to auscultation bilaterally; No wheeze; No crackles; No accessory muscles used  HEART: Regular rate and rhythm; No murmurs;   ABDOMEN: Soft, Nontender, Nondistended; Bowel sounds present; No guarding  EXTREMITIES:  2+ Peripheral Pulses, No cyanosis or edema  PSYCH: AAOx3  NEUROLOGY: non-focal  SKIN: No rashes or lesions PHYSICAL EXAM:  GENERAL: Drowsy, responding to verbal stimuli and following commands  HEAD:  Atraumatic, Normocephalic  EYES: EOMI, PERRLA, conjunctiva and sclera clear  NECK: Supple, No JVD  CHEST/LUNG: Clear to auscultation bilaterally; No wheeze; No crackles; No accessory muscles used  HEART: Regular rate and rhythm; No murmurs;   ABDOMEN: Soft, Mild tenderness in epigastric region, BS+ve  EXTREMITIES:  2+ Peripheral Pulses, No cyanosis or edema  PSYCH: AAOx1  NEUROLOGY: non-focal  SKIN: No rashes or lesions

## 2020-01-16 NOTE — H&P ADULT - PROBLEM SELECTOR PLAN 5
Pt with active alcohol use    c/w thiamine, folate, multivitamin  c/w IVF  CIWA monitoring  PRN ativan if pt becomes agitated  Social work consult

## 2020-01-16 NOTE — H&P ADULT - PROBLEM SELECTOR PLAN 1
Pt coming in with retching, persistent vomiting and nausea  Pt current condition can be due to his alcohol use, electrolyte abnormalities or pancreatitis  c/w IVF with thiamine, folic acid and multivitamin  prn zofran for nausea and vomiting  CT abdomen and pelvis to evaluate for pancreatitis

## 2020-01-16 NOTE — ED PROVIDER NOTE - CLINICAL SUMMARY MEDICAL DECISION MAKING FREE TEXT BOX
63 y/o M patient presents with vomiting and ill appearing. Will give fluids, Zofran, do labs and likely admit.

## 2020-01-16 NOTE — ED ADULT NURSE NOTE - ED STAT RN HANDOFF DETAILS 2
Report given to CONI Kenney. Pt resting in bed. No acute distress noted, denies chest p0ain, no shortness of breath indicated. Safety maintained.

## 2020-01-16 NOTE — H&P ADULT - PROBLEM SELECTOR PLAN 2
Pt with active alcohol use  Labs significant for Anion gap of 24, elevated lactate, Moderate Acetone  No acidosis  c/w IVF, Monitor bmp   Monitor electrolytes Pt with active alcohol use  Labs significant for Anion gap of 24, elevated lactate, Moderate Acetone  Appears to have metabolic acidosis due to etoh and starvation with resp alkalosis, aditional non anion gap met acidosis and met alkalosis from vomiting  c/w IVF, Monitor bmp   Anion gap improving  f/u bmp, lactate, magnesium and phosphorus  Monitor electrolytes

## 2020-01-16 NOTE — H&P ADULT - PROBLEM SELECTOR PLAN 3
Pt known diabetic, Hba1c 9 on past admission   On metformin, but pt unable to confirm if complaint with medications  f/u hba1c  FS q 6 till npo  HSS sliding scale

## 2020-01-17 LAB
ANION GAP SERPL CALC-SCNC: 11 MMOL/L — SIGNIFICANT CHANGE UP (ref 5–17)
BUN SERPL-MCNC: 15 MG/DL — SIGNIFICANT CHANGE UP (ref 7–18)
CALCIUM SERPL-MCNC: 7.8 MG/DL — LOW (ref 8.4–10.5)
CHLORIDE SERPL-SCNC: 106 MMOL/L — SIGNIFICANT CHANGE UP (ref 96–108)
CO2 SERPL-SCNC: 23 MMOL/L — SIGNIFICANT CHANGE UP (ref 22–31)
CREAT SERPL-MCNC: 0.77 MG/DL — SIGNIFICANT CHANGE UP (ref 0.5–1.3)
GLUCOSE BLDC GLUCOMTR-MCNC: 183 MG/DL — HIGH (ref 70–99)
GLUCOSE BLDC GLUCOMTR-MCNC: 224 MG/DL — HIGH (ref 70–99)
GLUCOSE BLDC GLUCOMTR-MCNC: 226 MG/DL — HIGH (ref 70–99)
GLUCOSE BLDC GLUCOMTR-MCNC: 228 MG/DL — HIGH (ref 70–99)
GLUCOSE SERPL-MCNC: 230 MG/DL — HIGH (ref 70–99)
HCV AB S/CO SERPL IA: 0.19 S/CO — SIGNIFICANT CHANGE UP (ref 0–0.99)
HCV AB SERPL-IMP: SIGNIFICANT CHANGE UP
MAGNESIUM SERPL-MCNC: 2.3 MG/DL — SIGNIFICANT CHANGE UP (ref 1.6–2.6)
PHOSPHATE SERPL-MCNC: 1.2 MG/DL — LOW (ref 2.5–4.5)
POTASSIUM SERPL-MCNC: 3.8 MMOL/L — SIGNIFICANT CHANGE UP (ref 3.5–5.3)
POTASSIUM SERPL-SCNC: 3.8 MMOL/L — SIGNIFICANT CHANGE UP (ref 3.5–5.3)
SODIUM SERPL-SCNC: 140 MMOL/L — SIGNIFICANT CHANGE UP (ref 135–145)

## 2020-01-17 RX ORDER — SODIUM,POTASSIUM PHOSPHATES 278-250MG
1 POWDER IN PACKET (EA) ORAL
Refills: 0 | Status: COMPLETED | OUTPATIENT
Start: 2020-01-17 | End: 2020-01-17

## 2020-01-17 RX ORDER — INSULIN LISPRO 100/ML
VIAL (ML) SUBCUTANEOUS
Refills: 0 | Status: DISCONTINUED | OUTPATIENT
Start: 2020-01-17 | End: 2020-01-17

## 2020-01-17 RX ORDER — INSULIN GLARGINE 100 [IU]/ML
5 INJECTION, SOLUTION SUBCUTANEOUS AT BEDTIME
Refills: 0 | Status: DISCONTINUED | OUTPATIENT
Start: 2020-01-17 | End: 2020-01-20

## 2020-01-17 RX ORDER — GLUCAGON INJECTION, SOLUTION 0.5 MG/.1ML
1 INJECTION, SOLUTION SUBCUTANEOUS ONCE
Refills: 0 | Status: DISCONTINUED | OUTPATIENT
Start: 2020-01-17 | End: 2020-01-21

## 2020-01-17 RX ORDER — POTASSIUM PHOSPHATE, MONOBASIC POTASSIUM PHOSPHATE, DIBASIC 236; 224 MG/ML; MG/ML
15 INJECTION, SOLUTION INTRAVENOUS ONCE
Refills: 0 | Status: COMPLETED | OUTPATIENT
Start: 2020-01-17 | End: 2020-01-17

## 2020-01-17 RX ORDER — INSULIN LISPRO 100/ML
VIAL (ML) SUBCUTANEOUS
Refills: 0 | Status: DISCONTINUED | OUTPATIENT
Start: 2020-01-17 | End: 2020-01-21

## 2020-01-17 RX ADMIN — Medication 40 MILLIEQUIVALENT(S): at 02:20

## 2020-01-17 RX ADMIN — SODIUM CHLORIDE 100 MILLILITER(S): 9 INJECTION, SOLUTION INTRAVENOUS at 00:48

## 2020-01-17 RX ADMIN — Medication 2: at 18:23

## 2020-01-17 RX ADMIN — Medication 105 MILLIGRAM(S): at 13:09

## 2020-01-17 RX ADMIN — Medication 105 MILLIGRAM(S): at 06:39

## 2020-01-17 RX ADMIN — Medication 1 PACKET(S): at 13:10

## 2020-01-17 RX ADMIN — POTASSIUM PHOSPHATE, MONOBASIC POTASSIUM PHOSPHATE, DIBASIC 62.5 MILLIMOLE(S): 236; 224 INJECTION, SOLUTION INTRAVENOUS at 13:09

## 2020-01-17 RX ADMIN — Medication 1 PACKET(S): at 18:23

## 2020-01-17 RX ADMIN — INSULIN GLARGINE 5 UNIT(S): 100 INJECTION, SOLUTION SUBCUTANEOUS at 21:59

## 2020-01-17 RX ADMIN — Medication 1 PACKET(S): at 12:22

## 2020-01-17 RX ADMIN — Medication 2 MILLIGRAM(S): at 12:39

## 2020-01-17 RX ADMIN — Medication 105 MILLIGRAM(S): at 22:00

## 2020-01-17 RX ADMIN — Medication 1 TABLET(S): at 13:11

## 2020-01-17 RX ADMIN — Medication 2 MILLIGRAM(S): at 21:59

## 2020-01-17 RX ADMIN — Medication 4: at 12:23

## 2020-01-17 NOTE — PROGRESS NOTE ADULT - SUBJECTIVE AND OBJECTIVE BOX
PGY1 Note discussed with supervising resident and primary attending.    Patient is a 64y old  Male who presents with a chief complaint of Vomiting and abdominal pain (2020 15:30)      INTERVAL HPI/OVERNIGHT EVENTS:  Pt resting comfortably. seen and examined at bedside  Overnight CIWA increased to 11, started standing ativan  labs normalizing  elytes replaced  s/w pending     MEDICATIONS  (STANDING):  dextrose 5% + sodium chloride 0.45%. 1000 milliLiter(s) (100 mL/Hr) IV Continuous <Continuous>  folic acid Injectable 1 milliGRAM(s) IV Push daily  insulin glargine Injectable (LANTUS) 5 Unit(s) SubCutaneous at bedtime  insulin lispro (HumaLOG) corrective regimen sliding scale   SubCutaneous three times a day before meals  LORazepam   Injectable 2 milliGRAM(s) IV Push every 4 hours  multivitamin/minerals 1 Tablet(s) Oral daily  potassium phosphate / sodium phosphate powder 1 Packet(s) Oral every 2 hours  potassium phosphate IVPB 15 milliMole(s) IV Intermittent once  thiamine IVPB 500 milliGRAM(s) IV Intermittent every 8 hours    MEDICATIONS  (PRN):  glucagon  Injectable 1 milliGRAM(s) IntraMuscular once PRN Glucose LESS THAN 70 milligrams/deciliter  LORazepam   Injectable 2 milliGRAM(s) IV Push every 2 hours PRN Symptom-triggered: each CIWA -Ar score 8 or GREATER  ondansetron Injectable 4 milliGRAM(s) IV Push every 4 hours PRN Vomiting      Allergies    No Known Allergies    Intolerances        REVIEW OF SYSTEMS:  CONSTITUTIONAL: No fever, weight loss, or fatigue  RESPIRATORY: No cough, wheezing, chills or hemoptysis; No shortness of breath  CARDIOVASCULAR: No chest pain, palpitations, dizziness, or leg swelling  GASTROINTESTINAL: No abdominal or epigastric pain. No nausea, vomiting, or hematemesis; No diarrhea or constipation. No melena or hematochezia.  NEUROLOGICAL: No headaches, memory loss, loss of strength, numbness, or tremors  SKIN: No itching, burning, rashes, or lesions     Vital Signs Last 24 Hrs  T(C): 37.3 (2020 05:21), Max: 37.3 (2020 05:21)  T(F): 99.2 (2020 05:21), Max: 99.2 (2020 05:21)  HR: 88 (2020 05:21) (78 - 96)  BP: 127/61 (2020 05:21) (127/61 - 147/77)  BP(mean): --  RR: 18 (2020 05:21) (18 - 18)  SpO2: 98% (2020 05:21) (96% - 100%)    PHYSICAL EXAM:  	GENERAL: Drowsy, responding to verbal stimuli and following commands  	HEAD:  Atraumatic, Normocephalic  	EYES: EOMI, PERRLA, conjunctiva and sclera clear  	NECK: Supple, No JVD  	CHEST/LUNG: Clear to auscultation bilaterally; No wheeze; No crackles; No accessory muscles used  	HEART: Regular rate and rhythm; No murmurs;   	ABDOMEN: Soft, Mild tenderness in epigastric region, BS+ve  	EXTREMITIES:  2+ Peripheral Pulses, No cyanosis or edema  	PSYCH: AAOx1  	NEUROLOGY: non-focal  SKIN: No rashes or lesions    LABS:                          14.3   4.58  )-----------( 106      ( 2020 08:00 )             39.7           140  |  106  |  15  ----------------------------<  230<H>  3.8   |  23  |  0.77    Ca    7.8<L>      2020 07:30  Phos  1.2       Mg     2.3         TPro  7.0  /  Alb  3.3<L>  /  TBili  1.3<H>  /  DBili  0.6<H>  /  AST  167<H>  /  ALT  130<H>  /  AlkPhos  81  16      PT/INR - ( 2020 08:00 )   PT: 12.0 sec;   INR: 1.08 ratio         PTT - ( 2020 08:00 )  PTT:26.8 sec  Urinalysis Basic - ( 2020 13:18 )    Color: Yellow / Appearance: Clear / S.005 / pH: x  Gluc: x / Ketone: Negative  / Bili: Negative / Urobili: Negative   Blood: x / Protein: Negative / Nitrite: Negative   Leuk Esterase: Trace / RBC: 0-2 /HPF / WBC 0-2 /HPF   Sq Epi: x / Non Sq Epi: Occasional /HPF / Bacteria: Few /HPF      CAPILLARY BLOOD GLUCOSE      POCT Blood Glucose.: 226 mg/dL (2020 06:54)  POCT Blood Glucose.: 278 mg/dL (2020 23:08)      RADIOLOGY & ADDITIONAL TESTS:    Imaging Personally Reviewed:  [ ] YES  [ ] NO    Consultant(s) Notes Reviewed:  [ ] YES  [ ] NO

## 2020-01-17 NOTE — PROGRESS NOTE ADULT - ASSESSMENT
slava nd examined vstsable afebrile physical unchaged   arousable on ativan  answering questions  ndenies headach  power reflexes nml, abd soft bs nml non tender not distended   no tremers  vsstable   labs noted   bs are on higher side    etoh intox  cont ativan  thiamin folic

## 2020-01-17 NOTE — PROGRESS NOTE ADULT - PROBLEM SELECTOR PLAN 2
Pt presented in with retching, persistent vomiting and nausea  Pt current condition can be due to his alcohol use, electrolyte abnormalities   Resolving  c/w IVF with thiamine, folic acid and multivitamin  prn zofran for nausea and vomiting  CT no evidence of pancreatitis

## 2020-01-17 NOTE — PROGRESS NOTE ADULT - SUBJECTIVE AND OBJECTIVE BOX
HPI:  Pt is a 65 y/o M pt with a PMHx of DM, ETOH dependence with last drink yesterday who presents to the ED with complaints of vomiting for several days. Notes he is unable to keep anything down but has been drinking alcohol everyday, last drink was yesterday. NKDA. Pt unable to provide much history stating that his nausea is very bad. Pt persistently vomiting in ED. (2020 15:30)      Patient is a 64y old  Male who presents with a chief complaint of Vomiting and abdominal pain (2020 10:21)      INTERVAL HPI/OVERNIGHT EVENTS:  T(C): 36.6 (20 @ 14:52), Max: 37.3 (20 @ 05:21)  HR: 83 (20 @ 14:52) (78 - 96)  BP: 130/72 (20 @ 14:52) (127/61 - 134/66)  RR: 17 (20 @ 14:52) (17 - 18)  SpO2: 97% (20 @ 14:52) (96% - 99%)  Wt(kg): --  I&O's Summary      REVIEW OF SYSTEMS: denies fever, chills, SOB, palpitations, chest pain, abdominal pain, nausea, vomitting, diarrhea, constipation, dizziness    MEDICATIONS  (STANDING):  dextrose 5% + sodium chloride 0.45%. 1000 milliLiter(s) (100 mL/Hr) IV Continuous <Continuous>  folic acid Injectable 1 milliGRAM(s) IV Push daily  insulin glargine Injectable (LANTUS) 5 Unit(s) SubCutaneous at bedtime  insulin lispro (HumaLOG) corrective regimen sliding scale   SubCutaneous three times a day before meals  LORazepam   Injectable 2 milliGRAM(s) IV Push every 4 hours  multivitamin/minerals 1 Tablet(s) Oral daily  potassium phosphate / sodium phosphate powder 1 Packet(s) Oral every 2 hours  thiamine IVPB 500 milliGRAM(s) IV Intermittent every 8 hours    MEDICATIONS  (PRN):  glucagon  Injectable 1 milliGRAM(s) IntraMuscular once PRN Glucose LESS THAN 70 milligrams/deciliter  LORazepam   Injectable 2 milliGRAM(s) IV Push every 2 hours PRN Symptom-triggered: each CIWA -Ar score 8 or GREATER  ondansetron Injectable 4 milliGRAM(s) IV Push every 4 hours PRN Vomiting      PHYSICAL EXAM:  GENERAL: NAD, well-groomed, well-developed  HEAD:  Atraumatic, Normocephalic  EYES: EOMI, PERRLA, conjunctiva and sclera clear  ENMT: No tonsillar erythema, exudates, or enlargement; Moist mucous membranes, Good dentition, No lesions  NECK: Supple, No JVD, Normal thyroid  NERVOUS SYSTEM:  Alert & Oriented X3, Good concentration; Motor Strength 5/5 B/L upper and lower extremities; DTRs 2+ intact and symmetric  CHEST/LUNG: Clear to percussion bilaterally; No rales, rhonchi, wheezing, or rubs  HEART: Regular rate and rhythm; No murmurs, rubs, or gallops  ABDOMEN: Soft, Nontender, Nondistended; Bowel sounds present  EXTREMITIES:  2+ Peripheral Pulses, No clubbing, cyanosis, or edema  LYMPH: No lymphadenopathy noted  SKIN: No rashes or lesions  LABS:                        14.3   4.58  )-----------( 106      ( 2020 08:00 )             39.7         140  |  106  |  15  ----------------------------<  230<H>  3.8   |  23  |  0.77    Ca    7.8<L>      2020 07:30  Phos  1.2       Mg     2.3         TPro  7.0  /  Alb  3.3<L>  /  TBili  1.3<H>  /  DBili  0.6<H>  /  AST  167<H>  /  ALT  130<H>  /  AlkPhos  81  16    PT/INR - ( 2020 08:00 )   PT: 12.0 sec;   INR: 1.08 ratio         PTT - ( 2020 08:00 )  PTT:26.8 sec  Urinalysis Basic - ( 2020 13:18 )    Color: Yellow / Appearance: Clear / S.005 / pH: x  Gluc: x / Ketone: Negative  / Bili: Negative / Urobili: Negative   Blood: x / Protein: Negative / Nitrite: Negative   Leuk Esterase: Trace / RBC: 0-2 /HPF / WBC 0-2 /HPF   Sq Epi: x / Non Sq Epi: Occasional /HPF / Bacteria: Few /HPF      CAPILLARY BLOOD GLUCOSE      POCT Blood Glucose.: 224 mg/dL (2020 11:40)  POCT Blood Glucose.: 226 mg/dL (2020 06:54)  POCT Blood Glucose.: 278 mg/dL (2020 23:08)      ABG - ( 2020 12:17 )  pH, Arterial: 7.41  pH, Blood: x     /  pCO2: 28    /  pO2: 66    / HCO3: 17    / Base Excess: -5.9  /  SaO2: 92                Urinalysis Basic - ( 2020 13:18 )    Color: Yellow / Appearance: Clear / S.005 / pH: x  Gluc: x / Ketone: Negative  / Bili: Negative / Urobili: Negative   Blood: x / Protein: Negative / Nitrite: Negative   Leuk Esterase: Trace / RBC: 0-2 /HPF / WBC 0-2 /HPF   Sq Epi: x / Non Sq Epi: Occasional /HPF / Bacteria: Few /HPF

## 2020-01-17 NOTE — PROGRESS NOTE ADULT - PROBLEM SELECTOR PLAN 1
Pt with active alcohol use  Labs show resolution. Vomiting and nausea improving  c/w IVF, Monitor bmp   Anion gap closed  f/u bmp, lactate, magnesium and phosphorus  Monitor electrolytes  CIWA protocol. On Ativan q4 standing, q2PRN

## 2020-01-18 LAB
ALBUMIN SERPL ELPH-MCNC: 3.2 G/DL — LOW (ref 3.5–5)
ALP SERPL-CCNC: 78 U/L — SIGNIFICANT CHANGE UP (ref 40–120)
ALT FLD-CCNC: 109 U/L DA — HIGH (ref 10–60)
ANION GAP SERPL CALC-SCNC: 9 MMOL/L — SIGNIFICANT CHANGE UP (ref 5–17)
AST SERPL-CCNC: 116 U/L — HIGH (ref 10–40)
BASOPHILS # BLD AUTO: 0.01 K/UL — SIGNIFICANT CHANGE UP (ref 0–0.2)
BASOPHILS NFR BLD AUTO: 0.3 % — SIGNIFICANT CHANGE UP (ref 0–2)
BILIRUB SERPL-MCNC: 1.4 MG/DL — HIGH (ref 0.2–1.2)
BUN SERPL-MCNC: 6 MG/DL — LOW (ref 7–18)
CALCIUM SERPL-MCNC: 8.3 MG/DL — LOW (ref 8.4–10.5)
CHLORIDE SERPL-SCNC: 97 MMOL/L — SIGNIFICANT CHANGE UP (ref 96–108)
CO2 SERPL-SCNC: 30 MMOL/L — SIGNIFICANT CHANGE UP (ref 22–31)
CREAT SERPL-MCNC: 0.52 MG/DL — SIGNIFICANT CHANGE UP (ref 0.5–1.3)
EOSINOPHIL # BLD AUTO: 0.06 K/UL — SIGNIFICANT CHANGE UP (ref 0–0.5)
EOSINOPHIL NFR BLD AUTO: 1.6 % — SIGNIFICANT CHANGE UP (ref 0–6)
GLUCOSE BLDC GLUCOMTR-MCNC: 168 MG/DL — HIGH (ref 70–99)
GLUCOSE BLDC GLUCOMTR-MCNC: 201 MG/DL — HIGH (ref 70–99)
GLUCOSE BLDC GLUCOMTR-MCNC: 211 MG/DL — HIGH (ref 70–99)
GLUCOSE BLDC GLUCOMTR-MCNC: 231 MG/DL — HIGH (ref 70–99)
GLUCOSE SERPL-MCNC: 176 MG/DL — HIGH (ref 70–99)
HBA1C BLD-MCNC: 10 % — HIGH (ref 4–5.6)
HCT VFR BLD CALC: 34.1 % — LOW (ref 39–50)
HGB BLD-MCNC: 12.1 G/DL — LOW (ref 13–17)
IMM GRANULOCYTES NFR BLD AUTO: 0.3 % — SIGNIFICANT CHANGE UP (ref 0–1.5)
LYMPHOCYTES # BLD AUTO: 1.68 K/UL — SIGNIFICANT CHANGE UP (ref 1–3.3)
LYMPHOCYTES # BLD AUTO: 45.8 % — HIGH (ref 13–44)
MAGNESIUM SERPL-MCNC: 1.7 MG/DL — SIGNIFICANT CHANGE UP (ref 1.6–2.6)
MCHC RBC-ENTMCNC: 34 PG — SIGNIFICANT CHANGE UP (ref 27–34)
MCHC RBC-ENTMCNC: 35.5 GM/DL — SIGNIFICANT CHANGE UP (ref 32–36)
MCV RBC AUTO: 95.8 FL — SIGNIFICANT CHANGE UP (ref 80–100)
MONOCYTES # BLD AUTO: 0.26 K/UL — SIGNIFICANT CHANGE UP (ref 0–0.9)
MONOCYTES NFR BLD AUTO: 7.1 % — SIGNIFICANT CHANGE UP (ref 2–14)
NEUTROPHILS # BLD AUTO: 1.65 K/UL — LOW (ref 1.8–7.4)
NEUTROPHILS NFR BLD AUTO: 44.9 % — SIGNIFICANT CHANGE UP (ref 43–77)
NRBC # BLD: 0 /100 WBCS — SIGNIFICANT CHANGE UP (ref 0–0)
PHOSPHATE SERPL-MCNC: 1.5 MG/DL — LOW (ref 2.5–4.5)
PLATELET # BLD AUTO: 65 K/UL — LOW (ref 150–400)
POTASSIUM SERPL-MCNC: 3.4 MMOL/L — LOW (ref 3.5–5.3)
POTASSIUM SERPL-SCNC: 3.4 MMOL/L — LOW (ref 3.5–5.3)
PROT SERPL-MCNC: 7.1 G/DL — SIGNIFICANT CHANGE UP (ref 6–8.3)
RBC # BLD: 3.56 M/UL — LOW (ref 4.2–5.8)
RBC # FLD: 11.5 % — SIGNIFICANT CHANGE UP (ref 10.3–14.5)
SODIUM SERPL-SCNC: 136 MMOL/L — SIGNIFICANT CHANGE UP (ref 135–145)
WBC # BLD: 3.67 K/UL — LOW (ref 3.8–10.5)
WBC # FLD AUTO: 3.67 K/UL — LOW (ref 3.8–10.5)

## 2020-01-18 RX ORDER — ACETAMINOPHEN 500 MG
650 TABLET ORAL EVERY 6 HOURS
Refills: 0 | Status: DISCONTINUED | OUTPATIENT
Start: 2020-01-18 | End: 2020-01-21

## 2020-01-18 RX ORDER — POTASSIUM CHLORIDE 20 MEQ
40 PACKET (EA) ORAL EVERY 4 HOURS
Refills: 0 | Status: COMPLETED | OUTPATIENT
Start: 2020-01-18 | End: 2020-01-18

## 2020-01-18 RX ORDER — SIMETHICONE 80 MG/1
80 TABLET, CHEWABLE ORAL ONCE
Refills: 0 | Status: COMPLETED | OUTPATIENT
Start: 2020-01-18 | End: 2020-01-18

## 2020-01-18 RX ORDER — ONDANSETRON 8 MG/1
4 TABLET, FILM COATED ORAL EVERY 8 HOURS
Refills: 0 | Status: DISCONTINUED | OUTPATIENT
Start: 2020-01-18 | End: 2020-01-21

## 2020-01-18 RX ADMIN — SIMETHICONE 80 MILLIGRAM(S): 80 TABLET, CHEWABLE ORAL at 18:18

## 2020-01-18 RX ADMIN — ONDANSETRON 4 MILLIGRAM(S): 8 TABLET, FILM COATED ORAL at 13:42

## 2020-01-18 RX ADMIN — Medication 4: at 17:36

## 2020-01-18 RX ADMIN — Medication 1 MILLIGRAM(S): at 12:13

## 2020-01-18 RX ADMIN — Medication 40 MILLIEQUIVALENT(S): at 08:15

## 2020-01-18 RX ADMIN — Medication 1 TABLET(S): at 12:12

## 2020-01-18 RX ADMIN — Medication 40 MILLIEQUIVALENT(S): at 12:13

## 2020-01-18 RX ADMIN — Medication 650 MILLIGRAM(S): at 16:01

## 2020-01-18 RX ADMIN — Medication 650 MILLIGRAM(S): at 13:42

## 2020-01-18 RX ADMIN — INSULIN GLARGINE 5 UNIT(S): 100 INJECTION, SOLUTION SUBCUTANEOUS at 22:05

## 2020-01-18 RX ADMIN — Medication 105 MILLIGRAM(S): at 06:16

## 2020-01-18 RX ADMIN — Medication 650 MILLIGRAM(S): at 23:12

## 2020-01-18 RX ADMIN — Medication 4: at 12:12

## 2020-01-18 RX ADMIN — Medication 2: at 08:15

## 2020-01-18 NOTE — DISCHARGE NOTE PROVIDER - HOSPITAL COURSE
Pt is a 63 y/o M pt with a PMHx of DM, ETOH dependence with last drink yesterday who presented to the ED with complaints of vomiting for several days. Notes he was unable to keep anything down but has been drinking alcohol everyday, last drink was yesterday. NKDA. Pt unable to provide much history stating that his nausea is very bad. Pt persistently vomiting in ED.         He was admitted for alcohol ketoacidosis and monitored for withdrawal. He was monitored on CIWA protocol and his mental status has improved with hydration and electrolyte/vitamin replacement. He is now AAOx3 and prepared for discharge on librium taper    He was seen by social work and education on alcohol abuse and resources in the community.         He is stable for discharge. Pt is a 63 y/o M pt with a PMHx of DM, ETOH dependence with last drink yesterday who presented to the ED with complaints of vomiting for several days. Notes he was unable to keep anything down but has been drinking alcohol everyday, last drink was yesterday. NKDA. Pt unable to provide much history stating that his nausea is very bad. Pt persistently vomiting in ED.         He was admitted for alcohol ketoacidosis and monitored for withdrawal. He was monitored on CIWA protocol and his mental status has improved with hydration and electrolyte/vitamin replacement. He is now AAOx3 and prepared for discharge on librium taper    He was seen by social work and education on alcohol abuse and resources in the community.     HbA1c was found to be 10.0. He was educated extensively on diabetic management and seen by endocrinology for outpatient followup.         He is stable for discharge.

## 2020-01-18 NOTE — PROGRESS NOTE ADULT - PROBLEM SELECTOR PLAN 2
Pt presented in with retching, persistent vomiting and nausea  Resolved  c/w IVF with thiamine, folic acid and multivitamin  prn zofran for nausea and vomiting  CT no evidence of pancreatitis

## 2020-01-18 NOTE — PROGRESS NOTE ADULT - SUBJECTIVE AND OBJECTIVE BOX
HPI:  Pt is a 63 y/o M pt with a PMHx of DM, ETOH dependence with last drink yesterday who presents to the ED with complaints of vomiting for several days. Notes he is unable to keep anything down but has been drinking alcohol everyday, last drink was yesterday. NKDA. Pt unable to provide much history stating that his nausea is very bad. Pt persistently vomiting in ED. (16 Jan 2020 15:30)      Patient is a 64y old  Male who presents with a chief complaint of Vomiting and abdominal pain (18 Jan 2020 09:43)      INTERVAL HPI/OVERNIGHT EVENTS:  T(C): 36.7 (01-18-20 @ 14:02), Max: 37.1 (01-17-20 @ 21:06)  HR: 97 (01-18-20 @ 14:02) (73 - 97)  BP: 117/71 (01-18-20 @ 14:02) (117/71 - 146/78)  RR: 18 (01-18-20 @ 14:02) (18 - 18)  SpO2: 98% (01-18-20 @ 14:02) (97% - 98%)  Wt(kg): --  I&O's Summary      REVIEW OF SYSTEMS: denies fever, chills, SOB, palpitations, chest pain, abdominal pain, nausea, vomitting, diarrhea, constipation, dizziness    MEDICATIONS  (STANDING):  dextrose 5% + sodium chloride 0.45%. 1000 milliLiter(s) (100 mL/Hr) IV Continuous <Continuous>  folic acid Injectable 1 milliGRAM(s) IV Push daily  insulin glargine Injectable (LANTUS) 5 Unit(s) SubCutaneous at bedtime  insulin lispro (HumaLOG) corrective regimen sliding scale   SubCutaneous three times a day before meals  LORazepam   Injectable 1.5 milliGRAM(s) IV Push every 6 hours  multivitamin/minerals 1 Tablet(s) Oral daily    MEDICATIONS  (PRN):  acetaminophen   Tablet .. 650 milliGRAM(s) Oral every 6 hours PRN Mild Pain (1 - 3)  glucagon  Injectable 1 milliGRAM(s) IntraMuscular once PRN Glucose LESS THAN 70 milligrams/deciliter  LORazepam   Injectable 2 milliGRAM(s) IV Push every 2 hours PRN Symptom-triggered: each CIWA -Ar score 8 or GREATER  ondansetron    Tablet 4 milliGRAM(s) Oral every 8 hours PRN Nausea and/or Vomiting  ondansetron Injectable 4 milliGRAM(s) IV Push every 4 hours PRN Vomiting      PHYSICAL EXAM:  GENERAL: NAD, well-groomed, well-developed  HEAD:  Atraumatic, Normocephalic  EYES: EOMI, PERRLA, conjunctiva and sclera clear  ENMT: No tonsillar erythema, exudates, or enlargement; Moist mucous membranes, Good dentition, No lesions  NECK: Supple, No JVD, Normal thyroid  NERVOUS SYSTEM:  Alert & Oriented X3, Good concentration; Motor Strength 5/5 B/L upper and lower extremities; DTRs 2+ intact and symmetric  CHEST/LUNG: Clear to percussion bilaterally; No rales, rhonchi, wheezing, or rubs  HEART: Regular rate and rhythm; No murmurs, rubs, or gallops  ABDOMEN: Soft, Nontender, Nondistended; Bowel sounds present  EXTREMITIES:  2+ Peripheral Pulses, No clubbing, cyanosis, or edema  LYMPH: No lymphadenopathy noted  SKIN: No rashes or lesions  LABS:                        12.1   3.67  )-----------( 65       ( 18 Jan 2020 06:09 )             34.1     01-18    136  |  97  |  6<L>  ----------------------------<  176<H>  3.4<L>   |  30  |  0.52    Ca    8.3<L>      18 Jan 2020 06:09  Phos  1.5     01-18  Mg     1.7     01-18    TPro  7.1  /  Alb  3.2<L>  /  TBili  1.4<H>  /  DBili  x   /  AST  116<H>  /  ALT  109<H>  /  AlkPhos  78  01-18        CAPILLARY BLOOD GLUCOSE      POCT Blood Glucose.: 231 mg/dL (18 Jan 2020 16:48)  POCT Blood Glucose.: 201 mg/dL (18 Jan 2020 11:41)  POCT Blood Glucose.: 168 mg/dL (18 Jan 2020 07:43)  POCT Blood Glucose.: 228 mg/dL (17 Jan 2020 21:43)

## 2020-01-18 NOTE — PROGRESS NOTE ADULT - PROBLEM SELECTOR PLAN 1
Pt with active alcohol use  Labs show resolution. Vomiting and nausea improving  c/w IVF, Monitor bmp   Anion gap closed  Monitor electrolytes  CIWA protocol. On Ativan 1.5 q6 standing, 2q2PRN

## 2020-01-18 NOTE — PROGRESS NOTE ADULT - SUBJECTIVE AND OBJECTIVE BOX
PGY1 Note discussed with supervising resident and primary attending.    Patient is a 64y old  Male who presents with a chief complaint of Vomiting and abdominal pain (2020 15:30)      INTERVAL HPI/OVERNIGHT EVENTS:  Pt resting comfortably. seen and examined at bedside  Oriented x3 but still mildly confused  Overnight CIWA 2-5, standing ativan decreased to 1.5 q6  labs normalizing  elytes replaced  s/w pending     MEDICATIONS  (STANDING):  dextrose 5% + sodium chloride 0.45%. 1000 milliLiter(s) (100 mL/Hr) IV Continuous <Continuous>  folic acid Injectable 1 milliGRAM(s) IV Push daily  insulin glargine Injectable (LANTUS) 5 Unit(s) SubCutaneous at bedtime  insulin lispro (HumaLOG) corrective regimen sliding scale   SubCutaneous three times a day before meals  LORazepam   Injectable 1.5 milliGRAM(s) IV Push every 6 hours  multivitamin/minerals 1 Tablet(s) Oral daily  potassium chloride   Powder 40 milliEquivalent(s) Oral every 4 hours    MEDICATIONS  (PRN):  glucagon  Injectable 1 milliGRAM(s) IntraMuscular once PRN Glucose LESS THAN 70 milligrams/deciliter  LORazepam   Injectable 2 milliGRAM(s) IV Push every 2 hours PRN Symptom-triggered: each CIWA -Ar score 8 or GREATER  ondansetron Injectable 4 milliGRAM(s) IV Push every 4 hours PRN Vomiting        Allergies    No Known Allergies    Intolerances        REVIEW OF SYSTEMS:  CONSTITUTIONAL: No fever, weight loss, or fatigue  RESPIRATORY: No cough, wheezing, chills or hemoptysis; No shortness of breath  CARDIOVASCULAR: No chest pain, palpitations, dizziness, or leg swelling  GASTROINTESTINAL: No abdominal or epigastric pain. No nausea, vomiting, or hematemesis; No diarrhea or constipation. No melena or hematochezia.  NEUROLOGICAL: No headaches, memory loss, loss of strength, numbness, or tremors  SKIN: No itching, burning, rashes, or lesions     Vital Signs Last 24 Hrs  T(C): 36.9 (2020 05:04), Max: 37.1 (2020 21:06)  T(F): 98.4 (2020 05:04), Max: 98.8 (2020 21:06)  HR: 82 (2020 05:04) (73 - 83)  BP: 140/78 (2020 05:04) (130/72 - 146/78)  BP(mean): --  RR: 18 (2020 05:04) (17 - 18)  SpO2: 97% (2020 05:04) (97% - 98%)    PHYSICAL EXAM:  	GENERAL: Drowsy, responding to verbal stimuli and following commands  	HEAD:  Atraumatic, Normocephalic  	EYES: EOMI, PERRLA, conjunctiva and sclera clear  	NECK: Supple, No JVD  	CHEST/LUNG: Clear to auscultation bilaterally; No wheeze; No crackles; No accessory muscles used  	HEART: Regular rate and rhythm; No murmurs;   	ABDOMEN: Soft, Mild tenderness in epigastric region, BS+ve  	EXTREMITIES:  2+ Peripheral Pulses, No cyanosis or edema  	PSYCH: AAOx1  	NEUROLOGY: non-focal  SKIN: No rashes or lesions    LABS:                                     12.1   3.67  )-----------( 65       ( 2020 06:09 )             34.1         18    136  |  97  |  6<L>  ----------------------------<  176<H>  3.4<L>   |  30  |  0.52    Ca    8.3<L>      2020 06:09  Phos  1.5       Mg     1.7     18    TPro  7.1  /  Alb  3.2<L>  /  TBili  1.4<H>  /  DBili  x   /  AST  116<H>  /  ALT  109<H>  /  AlkPhos  78  18    Urinalysis Basic - ( 2020 13:18 )    Color: Yellow / Appearance: Clear / S.005 / pH: x  Gluc: x / Ketone: Negative  / Bili: Negative / Urobili: Negative   Blood: x / Protein: Negative / Nitrite: Negative   Leuk Esterase: Trace / RBC: 0-2 /HPF / WBC 0-2 /HPF   Sq Epi: x / Non Sq Epi: Occasional /HPF / Bacteria: Few /HPF      CAPILLARY BLOOD GLUCOSE      POCT Blood Glucose.: 226 mg/dL (2020 06:54)  POCT Blood Glucose.: 278 mg/dL (2020 23:08)      RADIOLOGY & ADDITIONAL TESTS:    Imaging Personally Reviewed:  [ ] YES  [ ] NO    Consultant(s) Notes Reviewed:  [ ] YES  [ ] NO

## 2020-01-18 NOTE — DISCHARGE NOTE PROVIDER - NSDCCPCAREPLAN_GEN_ALL_CORE_FT
PRINCIPAL DISCHARGE DIAGNOSIS  Diagnosis: Alcoholic ketosis  Assessment and Plan of Treatment: You were admitted for alcohol withdrawal. Excessive alcohol use can be dangerous or even deadly. You were managed with appropriate medications during your stay to relieve your symptoms and prevent further complications. We restored your electrolytes as needed and have seen significant improvement in your symptoms. Additionally you have been counseled on the dangers of alcohol use and on strategies to cut back on your alcohol intake.    Your electrolyes have been repleted and monitored throughout your stay. Your medications were continued throughout your stay. Please follow up with your primary care provider to ensure continued optimal management.   You should follow up with your primary care physician to discuss your alcohol use and other health problems. They will be able to help you cut back on your drinking and to ensure your safety and health. Please set up an appointment to discuss your care within the next 1-2 weeks.      SECONDARY DISCHARGE DIAGNOSES  Diagnosis: Diabetes  Assessment and Plan of Treatment: Continue with your blood sugar medication. HbAIC was found to be ___ on admission.  You must maintain a healthy diet that consist of low sugar, low fat, low sodium diet. Exercise frequently if possible. Consider repeating your Hemoglobin A1c within 3 months after discharge to monitor your average blood glucose control. Follow up with primary care physician in one week after discharge. PRINCIPAL DISCHARGE DIAGNOSIS  Diagnosis: Alcoholic ketosis  Assessment and Plan of Treatment: You were admitted for alcohol withdrawal. Excessive alcohol use can be dangerous or even deadly. You were managed with appropriate medications during your stay to relieve your symptoms and prevent further complications. We restored your electrolytes as needed and have seen significant improvement in your symptoms. Additionally you have been counseled on the dangers of alcohol use and on strategies to cut back on your alcohol intake.    Your electrolyes have been repleted and monitored throughout your stay. Your medications were continued throughout your stay. Please follow up with your primary care provider to ensure continued optimal management.   You should follow up with your primary care physician to discuss your alcohol use and other health problems. They will be able to help you cut back on your drinking and to ensure your safety and health. Please set up an appointment to discuss your care within the next 1-2 weeks.      SECONDARY DISCHARGE DIAGNOSES  Diagnosis: Diabetes  Assessment and Plan of Treatment: Continue with your blood sugar medication. HbAIC was found to manasa 10.0 on admission.  You must maintain a healthy diet that consist of low sugar, low fat, low sodium diet. Exercise frequently if possible. Consider repeating your Hemoglobin A1c within 3 months after discharge to monitor your average blood glucose control. You were seen by Endocrinology and will continue to follow with them as outpatient.  Follow up with primary care physician in one week after discharge.

## 2020-01-18 NOTE — DISCHARGE NOTE PROVIDER - NSDCFUADDAPPT_GEN_ALL_CORE_FT
[address] [address]  [phone] [facility]  [address] [facility]    12:40 PM - Wednesday 1/22/20

## 2020-01-18 NOTE — DISCHARGE NOTE PROVIDER - NSDCMRMEDTOKEN_GEN_ALL_CORE_FT
metFORMIN 500 mg oral tablet: 1 tab(s) orally 2 times a day Ativan 0.5 mg oral tablet: 1 tab(s) orally 2 times a day MDD:2  metFORMIN 500 mg oral tablet: 2 tab(s) orally 2 times a day   Multiple Vitamins with Minerals oral tablet: 1 tab(s) orally once a day metFORMIN 500 mg oral tablet: 2 tab(s) orally 2 times a day   Multiple Vitamins with Minerals oral tablet: 1 tab(s) orally once a day

## 2020-01-18 NOTE — PROGRESS NOTE ADULT - ASSESSMENT
seen an dexamined vsstable afebrile awake alert not in any distress  denies any abd pain , no nausea or vomiting.  apetite fair  no headache.  physical unchaged   abd soft bs nml, non tender.   cns non focal no tremers  labs noted k 3.4  a/p etoh abuse   on ativan   doing ok     dm a1c 10  endo  on lantus

## 2020-01-18 NOTE — PROGRESS NOTE ADULT - ASSESSMENT
Pt is a 63 y/o M pt with a PMHx of DM, ETOH dependence with last drink yesterday who presents to the ED with complaints of vomiting for several days. Notes he is unable to keep anything down but has been drinking alcohol everyday, last drink was yesterday. NKDA. Pt unable to provide much history stating that his nausea is very bad. Pt persistently vomiting in ED. In ED Pt vitals were Vital Signs Last 24 Hrs  T(C): 37, HR: 95, BP: 147/77, RR: 18, SpO2: 99%  Pt was in significant distress due to vomiting and nausea, abd pain. Labs significant for Lac 5.8, transaminitis, Anion gap of 24 with bicarb 18, moderate acetone. ABG with normal PH. Pt is being admitted for alcoholic intoxication, Dehydration with ketonemia and intractable vomiting.

## 2020-01-18 NOTE — DISCHARGE NOTE PROVIDER - CARE PROVIDER_API CALL
Dong Sherwood)  Internal Medicine  [address] [facility], [address]  [place] [address]  Phone: [phone]  Fax: [phone]  Established Patient  Follow Up Time: 2 weeks

## 2020-01-19 LAB
ALBUMIN SERPL ELPH-MCNC: 3.4 G/DL — LOW (ref 3.5–5)
ALP SERPL-CCNC: 115 U/L — SIGNIFICANT CHANGE UP (ref 40–120)
ALT FLD-CCNC: 160 U/L DA — HIGH (ref 10–60)
ANION GAP SERPL CALC-SCNC: 4 MMOL/L — LOW (ref 5–17)
AST SERPL-CCNC: 175 U/L — HIGH (ref 10–40)
BASOPHILS # BLD AUTO: 0.01 K/UL — SIGNIFICANT CHANGE UP (ref 0–0.2)
BASOPHILS NFR BLD AUTO: 0.3 % — SIGNIFICANT CHANGE UP (ref 0–2)
BILIRUB SERPL-MCNC: 1.2 MG/DL — SIGNIFICANT CHANGE UP (ref 0.2–1.2)
BUN SERPL-MCNC: 7 MG/DL — SIGNIFICANT CHANGE UP (ref 7–18)
CALCIUM SERPL-MCNC: 8.8 MG/DL — SIGNIFICANT CHANGE UP (ref 8.4–10.5)
CHLORIDE SERPL-SCNC: 97 MMOL/L — SIGNIFICANT CHANGE UP (ref 96–108)
CO2 SERPL-SCNC: 32 MMOL/L — HIGH (ref 22–31)
CREAT SERPL-MCNC: 0.67 MG/DL — SIGNIFICANT CHANGE UP (ref 0.5–1.3)
EOSINOPHIL # BLD AUTO: 0.1 K/UL — SIGNIFICANT CHANGE UP (ref 0–0.5)
EOSINOPHIL NFR BLD AUTO: 2.7 % — SIGNIFICANT CHANGE UP (ref 0–6)
GLUCOSE BLDC GLUCOMTR-MCNC: 209 MG/DL — HIGH (ref 70–99)
GLUCOSE BLDC GLUCOMTR-MCNC: 235 MG/DL — HIGH (ref 70–99)
GLUCOSE BLDC GLUCOMTR-MCNC: 243 MG/DL — HIGH (ref 70–99)
GLUCOSE BLDC GLUCOMTR-MCNC: 263 MG/DL — HIGH (ref 70–99)
GLUCOSE SERPL-MCNC: 208 MG/DL — HIGH (ref 70–99)
HBV SURFACE AG SER-ACNC: SIGNIFICANT CHANGE UP
HCT VFR BLD CALC: 35.8 % — LOW (ref 39–50)
HCV AB S/CO SERPL IA: 0.27 S/CO — SIGNIFICANT CHANGE UP (ref 0–0.99)
HCV AB SERPL-IMP: SIGNIFICANT CHANGE UP
HGB BLD-MCNC: 12.6 G/DL — LOW (ref 13–17)
IMM GRANULOCYTES NFR BLD AUTO: 0.3 % — SIGNIFICANT CHANGE UP (ref 0–1.5)
LYMPHOCYTES # BLD AUTO: 1.74 K/UL — SIGNIFICANT CHANGE UP (ref 1–3.3)
LYMPHOCYTES # BLD AUTO: 46.2 % — HIGH (ref 13–44)
MAGNESIUM SERPL-MCNC: 1.9 MG/DL — SIGNIFICANT CHANGE UP (ref 1.6–2.6)
MCHC RBC-ENTMCNC: 33.9 PG — SIGNIFICANT CHANGE UP (ref 27–34)
MCHC RBC-ENTMCNC: 35.2 GM/DL — SIGNIFICANT CHANGE UP (ref 32–36)
MCV RBC AUTO: 96.2 FL — SIGNIFICANT CHANGE UP (ref 80–100)
MONOCYTES # BLD AUTO: 0.33 K/UL — SIGNIFICANT CHANGE UP (ref 0–0.9)
MONOCYTES NFR BLD AUTO: 8.8 % — SIGNIFICANT CHANGE UP (ref 2–14)
NEUTROPHILS # BLD AUTO: 1.58 K/UL — LOW (ref 1.8–7.4)
NEUTROPHILS NFR BLD AUTO: 41.7 % — LOW (ref 43–77)
NRBC # BLD: 0 /100 WBCS — SIGNIFICANT CHANGE UP (ref 0–0)
PHOSPHATE SERPL-MCNC: 2 MG/DL — LOW (ref 2.5–4.5)
PLATELET # BLD AUTO: 70 K/UL — LOW (ref 150–400)
POTASSIUM SERPL-MCNC: 4.7 MMOL/L — SIGNIFICANT CHANGE UP (ref 3.5–5.3)
POTASSIUM SERPL-SCNC: 4.7 MMOL/L — SIGNIFICANT CHANGE UP (ref 3.5–5.3)
PROT SERPL-MCNC: 7.7 G/DL — SIGNIFICANT CHANGE UP (ref 6–8.3)
RBC # BLD: 3.72 M/UL — LOW (ref 4.2–5.8)
RBC # FLD: 11.4 % — SIGNIFICANT CHANGE UP (ref 10.3–14.5)
SODIUM SERPL-SCNC: 133 MMOL/L — LOW (ref 135–145)
WBC # BLD: 3.77 K/UL — LOW (ref 3.8–10.5)
WBC # FLD AUTO: 3.77 K/UL — LOW (ref 3.8–10.5)

## 2020-01-19 RX ORDER — POTASSIUM CHLORIDE 20 MEQ
20 PACKET (EA) ORAL
Refills: 0 | Status: COMPLETED | OUTPATIENT
Start: 2020-01-19 | End: 2020-01-19

## 2020-01-19 RX ORDER — ONDANSETRON 8 MG/1
4 TABLET, FILM COATED ORAL ONCE
Refills: 0 | Status: COMPLETED | OUTPATIENT
Start: 2020-01-19 | End: 2020-01-19

## 2020-01-19 RX ADMIN — Medication 20 MILLIEQUIVALENT(S): at 17:39

## 2020-01-19 RX ADMIN — Medication 1.5 MILLIGRAM(S): at 11:56

## 2020-01-19 RX ADMIN — ONDANSETRON 4 MILLIGRAM(S): 8 TABLET, FILM COATED ORAL at 17:45

## 2020-01-19 RX ADMIN — Medication 1.5 MILLIGRAM(S): at 00:44

## 2020-01-19 RX ADMIN — Medication 650 MILLIGRAM(S): at 00:05

## 2020-01-19 RX ADMIN — Medication 1 MILLIGRAM(S): at 22:23

## 2020-01-19 RX ADMIN — Medication 1 TABLET(S): at 11:48

## 2020-01-19 RX ADMIN — INSULIN GLARGINE 5 UNIT(S): 100 INJECTION, SOLUTION SUBCUTANEOUS at 22:24

## 2020-01-19 RX ADMIN — Medication 1.5 MILLIGRAM(S): at 06:03

## 2020-01-19 RX ADMIN — Medication 1 MILLIGRAM(S): at 11:48

## 2020-01-19 RX ADMIN — Medication 4: at 08:32

## 2020-01-19 RX ADMIN — Medication 6: at 11:45

## 2020-01-19 RX ADMIN — Medication 20 MILLIEQUIVALENT(S): at 19:51

## 2020-01-19 RX ADMIN — Medication 4: at 17:38

## 2020-01-19 NOTE — PROGRESS NOTE ADULT - SUBJECTIVE AND OBJECTIVE BOX
HPI:  Pt is a 63 y/o M pt with a PMHx of DM, ETOH dependence with last drink yesterday who presents to the ED with complaints of vomiting for several days. Notes he is unable to keep anything down but has been drinking alcohol everyday, last drink was yesterday. NKDA. Pt unable to provide much history stating that his nausea is very bad. Pt persistently vomiting in ED. (16 Jan 2020 15:30)      Patient is a 64y old  Male who presents with a chief complaint of Vomiting and abdominal pain (18 Jan 2020 19:46)      INTERVAL HPI/OVERNIGHT EVENTS:  T(C): 37.1 (01-19-20 @ 05:05), Max: 37.1 (01-19-20 @ 05:05)  HR: 82 (01-19-20 @ 05:05) (79 - 82)  BP: 111/69 (01-19-20 @ 05:05) (111/69 - 120/73)  RR: 19 (01-19-20 @ 05:05) (19 - 19)  SpO2: 98% (01-19-20 @ 05:05) (98% - 99%)  Wt(kg): --  I&O's Summary      REVIEW OF SYSTEMS: denies fever, chills, SOB, palpitations, chest pain, abdominal pain, nausea, vomitting, diarrhea, constipation, dizziness    MEDICATIONS  (STANDING):  dextrose 5% + sodium chloride 0.45%. 1000 milliLiter(s) (100 mL/Hr) IV Continuous <Continuous>  folic acid Injectable 1 milliGRAM(s) IV Push daily  insulin glargine Injectable (LANTUS) 5 Unit(s) SubCutaneous at bedtime  insulin lispro (HumaLOG) corrective regimen sliding scale   SubCutaneous three times a day before meals  LORazepam   Injectable 1 milliGRAM(s) IV Push three times a day  multivitamin/minerals 1 Tablet(s) Oral daily  potassium chloride    Tablet ER 20 milliEquivalent(s) Oral every 2 hours    MEDICATIONS  (PRN):  acetaminophen   Tablet .. 650 milliGRAM(s) Oral every 6 hours PRN Mild Pain (1 - 3)  glucagon  Injectable 1 milliGRAM(s) IntraMuscular once PRN Glucose LESS THAN 70 milligrams/deciliter  ondansetron    Tablet 4 milliGRAM(s) Oral every 8 hours PRN Nausea and/or Vomiting  ondansetron Injectable 4 milliGRAM(s) IV Push every 4 hours PRN Vomiting      PHYSICAL EXAM:  GENERAL: NAD, well-groomed, well-developed  HEAD:  Atraumatic, Normocephalic  EYES: EOMI, PERRLA, conjunctiva and sclera clear  ENMT: No tonsillar erythema, exudates, or enlargement; Moist mucous membranes, Good dentition, No lesions  NECK: Supple, No JVD, Normal thyroid  NERVOUS SYSTEM:  Alert & Oriented X3, Good concentration; Motor Strength 5/5 B/L upper and lower extremities; DTRs 2+ intact and symmetric  CHEST/LUNG: Clear to percussion bilaterally; No rales, rhonchi, wheezing, or rubs  HEART: Regular rate and rhythm; No murmurs, rubs, or gallops  ABDOMEN: Soft, Nontender, Nondistended; Bowel sounds present  EXTREMITIES:  2+ Peripheral Pulses, No clubbing, cyanosis, or edema  LYMPH: No lymphadenopathy noted  SKIN: No rashes or lesions  LABS:                        12.6   3.77  )-----------( 70       ( 19 Jan 2020 08:15 )             35.8     01-19    133<L>  |  97  |  7   ----------------------------<  208<H>  4.7   |  32<H>  |  0.67    Ca    8.8      19 Jan 2020 08:15  Phos  2.0     01-19  Mg     1.9     01-19    TPro  7.7  /  Alb  3.4<L>  /  TBili  1.2  /  DBili  x   /  AST  175<H>  /  ALT  160<H>  /  AlkPhos  115  01-19        CAPILLARY BLOOD GLUCOSE      POCT Blood Glucose.: 263 mg/dL (19 Jan 2020 11:37)  POCT Blood Glucose.: 209 mg/dL (19 Jan 2020 08:00)  POCT Blood Glucose.: 211 mg/dL (18 Jan 2020 21:53)  POCT Blood Glucose.: 231 mg/dL (18 Jan 2020 16:48)

## 2020-01-19 NOTE — PROGRESS NOTE ADULT - ASSESSMENT
slava nd examined  awake alert  not in any distress, no abd pain, nausea, vomiting   apetite fair  vs stable physical done no abd tenderness,  no tremers  decrease ativan  possible d/c tomorrow

## 2020-01-20 LAB
ALBUMIN SERPL ELPH-MCNC: 3.3 G/DL — LOW (ref 3.5–5)
ALP SERPL-CCNC: 112 U/L — SIGNIFICANT CHANGE UP (ref 40–120)
ALT FLD-CCNC: 168 U/L DA — HIGH (ref 10–60)
ANION GAP SERPL CALC-SCNC: 7 MMOL/L — SIGNIFICANT CHANGE UP (ref 5–17)
AST SERPL-CCNC: 152 U/L — HIGH (ref 10–40)
BASOPHILS # BLD AUTO: 0.03 K/UL — SIGNIFICANT CHANGE UP (ref 0–0.2)
BASOPHILS NFR BLD AUTO: 0.7 % — SIGNIFICANT CHANGE UP (ref 0–2)
BILIRUB SERPL-MCNC: 0.9 MG/DL — SIGNIFICANT CHANGE UP (ref 0.2–1.2)
BUN SERPL-MCNC: 11 MG/DL — SIGNIFICANT CHANGE UP (ref 7–18)
CALCIUM SERPL-MCNC: 8.8 MG/DL — SIGNIFICANT CHANGE UP (ref 8.4–10.5)
CHLORIDE SERPL-SCNC: 97 MMOL/L — SIGNIFICANT CHANGE UP (ref 96–108)
CO2 SERPL-SCNC: 26 MMOL/L — SIGNIFICANT CHANGE UP (ref 22–31)
CREAT SERPL-MCNC: 0.66 MG/DL — SIGNIFICANT CHANGE UP (ref 0.5–1.3)
EOSINOPHIL # BLD AUTO: 0.14 K/UL — SIGNIFICANT CHANGE UP (ref 0–0.5)
EOSINOPHIL NFR BLD AUTO: 3.1 % — SIGNIFICANT CHANGE UP (ref 0–6)
GLUCOSE BLDC GLUCOMTR-MCNC: 170 MG/DL — HIGH (ref 70–99)
GLUCOSE BLDC GLUCOMTR-MCNC: 205 MG/DL — HIGH (ref 70–99)
GLUCOSE BLDC GLUCOMTR-MCNC: 276 MG/DL — HIGH (ref 70–99)
GLUCOSE BLDC GLUCOMTR-MCNC: 280 MG/DL — HIGH (ref 70–99)
GLUCOSE SERPL-MCNC: 230 MG/DL — HIGH (ref 70–99)
HCT VFR BLD CALC: 34.3 % — LOW (ref 39–50)
HGB BLD-MCNC: 12.3 G/DL — LOW (ref 13–17)
IMM GRANULOCYTES NFR BLD AUTO: 0.2 % — SIGNIFICANT CHANGE UP (ref 0–1.5)
LYMPHOCYTES # BLD AUTO: 2.1 K/UL — SIGNIFICANT CHANGE UP (ref 1–3.3)
LYMPHOCYTES # BLD AUTO: 46.4 % — HIGH (ref 13–44)
MAGNESIUM SERPL-MCNC: 1.9 MG/DL — SIGNIFICANT CHANGE UP (ref 1.6–2.6)
MCHC RBC-ENTMCNC: 34.2 PG — HIGH (ref 27–34)
MCHC RBC-ENTMCNC: 35.9 GM/DL — SIGNIFICANT CHANGE UP (ref 32–36)
MCV RBC AUTO: 95.3 FL — SIGNIFICANT CHANGE UP (ref 80–100)
MONOCYTES # BLD AUTO: 0.48 K/UL — SIGNIFICANT CHANGE UP (ref 0–0.9)
MONOCYTES NFR BLD AUTO: 10.6 % — SIGNIFICANT CHANGE UP (ref 2–14)
NEUTROPHILS # BLD AUTO: 1.77 K/UL — LOW (ref 1.8–7.4)
NEUTROPHILS NFR BLD AUTO: 39 % — LOW (ref 43–77)
NRBC # BLD: 0 /100 WBCS — SIGNIFICANT CHANGE UP (ref 0–0)
PHOSPHATE SERPL-MCNC: 2.4 MG/DL — LOW (ref 2.5–4.5)
PLATELET # BLD AUTO: 101 K/UL — LOW (ref 150–400)
POTASSIUM SERPL-MCNC: 4 MMOL/L — SIGNIFICANT CHANGE UP (ref 3.5–5.3)
POTASSIUM SERPL-SCNC: 4 MMOL/L — SIGNIFICANT CHANGE UP (ref 3.5–5.3)
PROT SERPL-MCNC: 7.4 G/DL — SIGNIFICANT CHANGE UP (ref 6–8.3)
RBC # BLD: 3.6 M/UL — LOW (ref 4.2–5.8)
RBC # FLD: 11.4 % — SIGNIFICANT CHANGE UP (ref 10.3–14.5)
SODIUM SERPL-SCNC: 130 MMOL/L — LOW (ref 135–145)
WBC # BLD: 4.53 K/UL — SIGNIFICANT CHANGE UP (ref 3.8–10.5)
WBC # FLD AUTO: 4.53 K/UL — SIGNIFICANT CHANGE UP (ref 3.8–10.5)

## 2020-01-20 PROCEDURE — 99254 IP/OBS CNSLTJ NEW/EST MOD 60: CPT

## 2020-01-20 RX ORDER — INSULIN GLARGINE 100 [IU]/ML
10 INJECTION, SOLUTION SUBCUTANEOUS AT BEDTIME
Refills: 0 | Status: DISCONTINUED | OUTPATIENT
Start: 2020-01-20 | End: 2020-01-21

## 2020-01-20 RX ORDER — MULTIVIT-MIN/FERROUS GLUCONATE 9 MG/15 ML
1 LIQUID (ML) ORAL
Qty: 30 | Refills: 0
Start: 2020-01-20 | End: 2020-02-18

## 2020-01-20 RX ORDER — INSULIN LISPRO 100/ML
3 VIAL (ML) SUBCUTANEOUS
Refills: 0 | Status: DISCONTINUED | OUTPATIENT
Start: 2020-01-20 | End: 2020-01-21

## 2020-01-20 RX ADMIN — INSULIN GLARGINE 10 UNIT(S): 100 INJECTION, SOLUTION SUBCUTANEOUS at 22:19

## 2020-01-20 RX ADMIN — Medication 1 MILLIGRAM(S): at 12:05

## 2020-01-20 RX ADMIN — Medication 3 UNIT(S): at 12:05

## 2020-01-20 RX ADMIN — Medication 1 TABLET(S): at 12:05

## 2020-01-20 RX ADMIN — Medication 6: at 12:05

## 2020-01-20 RX ADMIN — Medication 2: at 16:53

## 2020-01-20 RX ADMIN — Medication 0.5 MILLIGRAM(S): at 21:47

## 2020-01-20 RX ADMIN — Medication 0.5 MILLIGRAM(S): at 13:59

## 2020-01-20 RX ADMIN — Medication 1 MILLIGRAM(S): at 06:18

## 2020-01-20 RX ADMIN — Medication 4: at 08:04

## 2020-01-20 RX ADMIN — Medication 3 UNIT(S): at 16:52

## 2020-01-20 NOTE — CONSULT NOTE ADULT - ASSESSMENT
Pt is a 64  year old Maltese male with PMH of type 2 DM (dx 4 years ago) and ETOH dependence presented with complaint of vomiting. Endocrinology was consulted for management of uncontrolled type 2 DM with hyperglycemia, A1c 10.0%.    1. Uncontrolled type 2 DM with hyperglycemia  -A1c 10.0% likely secondary to medication noncompliance as patient ran out of medication 3 months aog  -FS remains above goal >200  -agree to increase to Lantus 10 units at bedtime  -agree to start Humalog 3 units TID with meals  -recommend to continue mealtime rapid acting insulin as below  BG 70-100mg/dL 0 units  -150 mg/dL 2 units  -200 mg/dL 4 units  -250 mg/dL 6 units  -300 mg/dL 8 units  -350 mg/dL 10 units  -400 mg/dL 12 units  BG > 400mg/dL 14 units  -FS AC HS  -ensure consistent carbohydrate diet   -will monitor FS and adjust accordingly   --if patient is NPO for any reason please change FS and sliding scale to NPO lispro scale   -On discharge, recommend to establish care at [facility] as patient does not have insurance coverage. Discharge on Metformin 1000mg PO BID. Patient can not afford this medication. Recommend [facility] to provide medications before discharge.    2. Intractable Vomiting  -vomiting now resolved and patient is tolerating PO  -CT negative for pancreatitis    3.EtOH dependence  -c/w thiamine, folate, multivitamin  -c/w IVF as per primary team  -[facility] protocol  -Social work consult.     Plan discussed with primary team  Thank you for allowing me to participate in the care of this patient  Please  call  w/ any questions or concerns [phone]

## 2020-01-20 NOTE — PROGRESS NOTE ADULT - SUBJECTIVE AND OBJECTIVE BOX
HPI:  Pt is a 65 y/o M pt with a PMHx of DM, ETOH dependence with last drink yesterday who presents to the ED with complaints of vomiting for several days. Notes he is unable to keep anything down but has been drinking alcohol everyday, last drink was yesterday. NKDA. Pt unable to provide much history stating that his nausea is very bad. Pt persistently vomiting in ED. (16 Jan 2020 15:30)      Patient is a 64y old  Male who presents with a chief complaint of Vomiting and abdominal pain (20 Jan 2020 12:44)      INTERVAL HPI/OVERNIGHT EVENTS:  T(C): 36.6 (01-20-20 @ 05:32), Max: 36.7 (01-19-20 @ 14:24)  HR: 84 (01-20-20 @ 05:32) (84 - 100)  BP: 112/60 (01-20-20 @ 05:32) (103/63 - 112/60)  RR: 17 (01-20-20 @ 05:32) (16 - 19)  SpO2: 100% (01-20-20 @ 05:32) (99% - 100%)  Wt(kg): --  I&O's Summary      REVIEW OF SYSTEMS: denies fever, chills, SOB, palpitations, chest pain, abdominal pain, nausea, vomitting, diarrhea, constipation, dizziness    MEDICATIONS  (STANDING):  dextrose 5% + sodium chloride 0.45%. 1000 milliLiter(s) (100 mL/Hr) IV Continuous <Continuous>  folic acid Injectable 1 milliGRAM(s) IV Push daily  insulin glargine Injectable (LANTUS) 10 Unit(s) SubCutaneous at bedtime  insulin lispro (HumaLOG) corrective regimen sliding scale   SubCutaneous three times a day before meals  insulin lispro Injectable (HumaLOG) 3 Unit(s) SubCutaneous three times a day before meals  LORazepam     Tablet 0.5 milliGRAM(s) Oral three times a day  multivitamin/minerals 1 Tablet(s) Oral daily    MEDICATIONS  (PRN):  acetaminophen   Tablet .. 650 milliGRAM(s) Oral every 6 hours PRN Mild Pain (1 - 3)  glucagon  Injectable 1 milliGRAM(s) IntraMuscular once PRN Glucose LESS THAN 70 milligrams/deciliter  ondansetron    Tablet 4 milliGRAM(s) Oral every 8 hours PRN Nausea and/or Vomiting  ondansetron Injectable 4 milliGRAM(s) IV Push every 4 hours PRN Vomiting      PHYSICAL EXAM:  GENERAL: NAD, well-groomed, well-developed  HEAD:  Atraumatic, Normocephalic  EYES: EOMI, PERRLA, conjunctiva and sclera clear  ENMT: No tonsillar erythema, exudates, or enlargement; Moist mucous membranes, Good dentition, No lesions  NECK: Supple, No JVD, Normal thyroid  NERVOUS SYSTEM:  Alert & Oriented X3, Good concentration; Motor Strength 5/5 B/L upper and lower extremities; DTRs 2+ intact and symmetric  CHEST/LUNG: Clear to percussion bilaterally; No rales, rhonchi, wheezing, or rubs  HEART: Regular rate and rhythm; No murmurs, rubs, or gallops  ABDOMEN: Soft, Nontender, Nondistended; Bowel sounds present  EXTREMITIES:  2+ Peripheral Pulses, No clubbing, cyanosis, or edema  LYMPH: No lymphadenopathy noted  SKIN: No rashes or lesions  LABS:                        12.3   4.53  )-----------( 101      ( 20 Jan 2020 07:00 )             34.3     01-20    130<L>  |  97  |  11  ----------------------------<  230<H>  4.0   |  26  |  0.66    Ca    8.8      20 Jan 2020 07:00  Phos  2.4     01-20  Mg     1.9     01-20    TPro  7.4  /  Alb  3.3<L>  /  TBili  0.9  /  DBili  x   /  AST  152<H>  /  ALT  168<H>  /  AlkPhos  112  01-20        CAPILLARY BLOOD GLUCOSE      POCT Blood Glucose.: 276 mg/dL (20 Jan 2020 11:46)  POCT Blood Glucose.: 205 mg/dL (20 Jan 2020 07:43)  POCT Blood Glucose.: 243 mg/dL (19 Jan 2020 22:05)  POCT Blood Glucose.: 235 mg/dL (19 Jan 2020 17:02)

## 2020-01-20 NOTE — CONSULT NOTE ADULT - SUBJECTIVE AND OBJECTIVE BOX
Pt is a 64  year old Chinese male with PMH of type 2 DM (dx 4 years ago) and ETOH dependence presented with complaint of vomiting. Endocrinology was consulted for management of uncontrolled type 2 DM with hyperglycemia, A1c 10.0%.    The patient reports a history of type 2 DM managed at [facility]. He has only visited there twice. He was started on a regimen of Metformin 500mg PO BID but ran out of the medication in November 2019. He has not returned for a follow up visit as he states he lacks the funds to travel there. He reports recently worsening fatigue and dry mouth over the last several months but denies neuropathy. He reports a diet high in carbohydrates. BG is now better controlled on basal-bolus regimen but remains above goal >200. Family history is significant for type 2 DM in his brother.       ALLERGIES  NKDA    PAST MEDICAL & SURGICAL HISTORY:  Diabetes  EtOH dependence  No significant past surgical history      SOCIAL HISTORY  Alcohol: active daily alcohol use  Tobacco: denies   Illicit substance use: denies       FAMILY HISTORY:  Type 2 DM- brother         MEDICATIONS  (STANDING):  dextrose 5% + sodium chloride 0.45%. 1000 milliLiter(s) (100 mL/Hr) IV Continuous <Continuous>  folic acid Injectable 1 milliGRAM(s) IV Push daily  insulin glargine Injectable (LANTUS) 10 Unit(s) SubCutaneous at bedtime  insulin lispro (HumaLOG) corrective regimen sliding scale   SubCutaneous three times a day before meals  insulin lispro Injectable (HumaLOG) 3 Unit(s) SubCutaneous three times a day before meals  LORazepam     Tablet 0.5 milliGRAM(s) Oral three times a day  multivitamin/minerals 1 Tablet(s) Oral daily    MEDICATIONS  (PRN):  acetaminophen   Tablet .. 650 milliGRAM(s) Oral every 6 hours PRN Mild Pain (1 - 3)  glucagon  Injectable 1 milliGRAM(s) IntraMuscular once PRN Glucose LESS THAN 70 milligrams/deciliter  ondansetron    Tablet 4 milliGRAM(s) Oral every 8 hours PRN Nausea and/or Vomiting  ondansetron Injectable 4 milliGRAM(s) IV Push every 4 hours PRN Vomiting      REVIEW OF SYSTEMS:    VITAL SIGNS  T(C): 36.6 (01-20-20 @ 05:32), Max: 36.7 (01-19-20 @ 14:24)  HR: 84 (01-20-20 @ 05:32) (84 - 100)  BP: 112/60 (01-20-20 @ 05:32) (103/63 - 112/60)  RR: 17 (01-20-20 @ 05:32) (16 - 19)  SpO2: 100% (01-20-20 @ 05:32) (99% - 100%)    CONSTITUTIONAL: No fever, weight loss, or fatigue  EYES: No eye pain, visual disturbances, or discharge  ENMT:  dry mouth, No difficulty hearing, tinnitus, vertigo; No sinus or throat pain  NECK: No pain or stiffness  RESPIRATORY: No cough, wheezing, chills or hemoptysis; No shortness of breath  CARDIOVASCULAR: No chest pain, palpitations, dizziness, or leg swelling  GASTROINTESTINAL: No abdominal or epigastric pain. No nausea, vomiting, or hematemesis; No diarrhea or constipation. No melena or hematochezia.  GENITOURINARY: No dysuria, frequency, hematuria, or incontinence  NEUROLOGICAL: No headaches, memory loss, loss of strength, numbness, or tremors  SKIN: No itching, burning, rashes, or lesions   LYMPH NODES: No enlarged glands  ENDOCRINE: polydipsia No heat or cold intolerance; No hair loss  MUSCULOSKELETAL: No joint pain or swelling; No muscle, back, or extremity pain  PSYCHIATRIC: No depression, anxiety, mood swings, or difficulty sleeping  HEME/LYMPH: No easy bruising, or bleeding gums  ALLERGY AND IMMUNOLOGIC: No hives or eczema      PHYSICAL EXAM:  GENERAL: NAD, well-groomed, well-developed  HEAD:  Atraumatic, Normocephalic  EYES: EOMI, PERRLA, conjunctiva and sclera clear  ENMT: No tonsillar erythema, exudates, or enlargement; Moist mucous membranes,  No lesions  NECK: Supple, No JVD, Normal thyroid  NERVOUS SYSTEM:  Alert & Oriented X3, Good concentration; Motor Strength 5/5 B/L upper and lower extremities;   CHEST/LUNG: Clear to ausculation bilaterally; No rales, rhonchi, wheezing, or rubs  HEART: Regular rate and rhythm; No murmurs, rubs, or gallops  ABDOMEN: Soft, Nontender, Nondistended; Bowel sounds present  EXTREMITIES:  2+ Peripheral Pulses, No clubbing, cyanosis, or edema  LYMPH: No lymphadenopathy noted  SKIN: No rashes or lesions    LABS:                        12.3   4.53  )-----------( 101      ( 20 Jan 2020 07:00 )             34.3     01-20    130<L>  |  97  |  11  ----------------------------<  230<H>  4.0   |  26  |  0.66    Ca    8.8      20 Jan 2020 07:00  Phos  2.4     01-20  Mg     1.9     01-20    TPro  7.4  /  Alb  3.3<L>  /  TBili  0.9  /  DBili  x   /  AST  152<H>  /  ALT  168<H>  /  AlkPhos  112  01-20        CAPILLARY BLOOD GLUCOSE      POCT Blood Glucose.: 276 mg/dL (20 Jan 2020 11:46)  POCT Blood Glucose.: 205 mg/dL (20 Jan 2020 07:43)  POCT Blood Glucose.: 243 mg/dL (19 Jan 2020 22:05)  POCT Blood Glucose.: 235 mg/dL (19 Jan 2020 17:02)    A1c 10.0%

## 2020-01-20 NOTE — PROGRESS NOTE ADULT - PROBLEM SELECTOR PLAN 1
Pt with active alcohol use  Labs show resolution. Vomiting and nausea improving  c/w IVF, Monitor bmp   Anion gap closed  Monitor electrolytes  CIWA protocol. On Ativan 1mg q8 for now

## 2020-01-20 NOTE — PROGRESS NOTE ADULT - SUBJECTIVE AND OBJECTIVE BOX
PGY1 Note discussed with supervising resident and primary attending.    Patient is a 64y old  Male who presents with a chief complaint of Vomiting and abdominal pain (2020 15:30)      INTERVAL HPI/OVERNIGHT EVENTS:  Pt resting comfortably. seen and examined at bedside  Oriented x3  Overnight CIWA 2-5, standing ativan decreased to 1mg q8  labs normalizing  elytes replaced  s/w pending     MEDICATIONS  (STANDING):  dextrose 5% + sodium chloride 0.45%. 1000 milliLiter(s) (100 mL/Hr) IV Continuous <Continuous>  folic acid Injectable 1 milliGRAM(s) IV Push daily  insulin glargine Injectable (LANTUS) 5 Unit(s) SubCutaneous at bedtime  insulin lispro (HumaLOG) corrective regimen sliding scale   SubCutaneous three times a day before meals  LORazepam   Injectable 1 milliGRAM(s) IV Push three times a day  multivitamin/minerals 1 Tablet(s) Oral daily    MEDICATIONS  (PRN):  acetaminophen   Tablet .. 650 milliGRAM(s) Oral every 6 hours PRN Mild Pain (1 - 3)  glucagon  Injectable 1 milliGRAM(s) IntraMuscular once PRN Glucose LESS THAN 70 milligrams/deciliter  ondansetron    Tablet 4 milliGRAM(s) Oral every 8 hours PRN Nausea and/or Vomiting  ondansetron Injectable 4 milliGRAM(s) IV Push every 4 hours PRN Vomiting        Allergies    No Known Allergies    Intolerances        REVIEW OF SYSTEMS:  CONSTITUTIONAL: No fever, weight loss, or fatigue  RESPIRATORY: No cough, wheezing, chills or hemoptysis; No shortness of breath  CARDIOVASCULAR: No chest pain, palpitations, dizziness, or leg swelling  GASTROINTESTINAL: No abdominal or epigastric pain. No nausea, vomiting, or hematemesis; No diarrhea or constipation. No melena or hematochezia.  NEUROLOGICAL: No headaches, memory loss, loss of strength, numbness, or tremors  SKIN: No itching, burning, rashes, or lesions     Vital Signs Last 24 Hrs  T(C): 36.6 (2020 05:32), Max: 36.7 (2020 14:24)  T(F): 97.9 (2020 05:32), Max: 98 (2020 14:24)  HR: 84 (2020 05:32) (84 - 100)  BP: 112/60 (2020 05:32) (103/63 - 112/60)  BP(mean): --  RR: 17 (2020 05:32) (16 - 19)  SpO2: 100% (2020 05:32) (99% - 100%)    PHYSICAL EXAM:  	GENERAL: Drowsy, responding to verbal stimuli and following commands  	HEAD:  Atraumatic, Normocephalic  	EYES: EOMI, PERRLA, conjunctiva and sclera clear  	NECK: Supple, No JVD  	CHEST/LUNG: Clear to auscultation bilaterally; No wheeze; No crackles; No accessory muscles used  	HEART: Regular rate and rhythm; No murmurs;   	ABDOMEN: Soft, Mild tenderness in epigastric region, BS+ve  	EXTREMITIES:  2+ Peripheral Pulses, No cyanosis or edema  	PSYCH: AAOx1  	NEUROLOGY: non-focal  SKIN: No rashes or lesions    LABS:                                                12.3   4.53  )-----------( 101      ( 2020 07:00 )             34.3           01-20    130<L>  |  97  |  11  ----------------------------<  230<H>  4.0   |  26  |  0.66    Ca    8.8      2020 07:00  Phos  2.4     01-20  Mg     1.9     01-20    TPro  7.4  /  Alb  3.3<L>  /  TBili  0.9  /  DBili  x   /  AST  152<H>  /  ALT  168<H>  /  AlkPhos  112  01-20      Urinalysis Basic - ( 2020 13:18 )    Color: Yellow / Appearance: Clear / S.005 / pH: x  Gluc: x / Ketone: Negative  / Bili: Negative / Urobili: Negative   Blood: x / Protein: Negative / Nitrite: Negative   Leuk Esterase: Trace / RBC: 0-2 /HPF / WBC 0-2 /HPF   Sq Epi: x / Non Sq Epi: Occasional /HPF / Bacteria: Few /HPF      CAPILLARY BLOOD GLUCOSE      POCT Blood Glucose.: 226 mg/dL (2020 06:54)  POCT Blood Glucose.: 278 mg/dL (2020 23:08)      RADIOLOGY & ADDITIONAL TESTS:    Imaging Personally Reviewed:  [ ] YES  [ ] NO    Consultant(s) Notes Reviewed:  [ ] YES  [ ] NO

## 2020-01-20 NOTE — PROGRESS NOTE ADULT - PROBLEM SELECTOR PROBLEM 2
CHIEF COMPLAINT:  Lupus flareup.

 

HISTORY OF PRESENT ILLNESS:  This is a 41-year-old female with a known history of lupus, type 2 insul
in-dependent diabetes and hypertension who presents with a chief complaint of a lupus flare up.  Yolanda
ent describes her lupus flare up is consisting of painful skin lesions and particularly oral lesions 
accompanied by a low-grade fever and generalized myalgias and arthralgias.  This has been progressive
 over the last 2-3 days.  The patient is not aware of any precipitating factor.  The patient endorses
 having had prior symptoms of lupus flares, but says that she has not had one this severe since she w
as initially diagnosed with lupus.

 

REVIEW OF SYSTEMS:  As per HPI.  CONSTITUTIONAL:  Denies any overt weight changes that she is aware o
f over the last 2-3 months.  Endorses feeling low-grade subjective fevers without a temperature that 
was measured at home.  Denies any chills.  HEENT:  Denies any headache, lightheadedness or dizziness.
  CARDIOVASCULAR:  Denies any chest pain, chest pressure, left-sided arm numbness or tingling.  RESPI
RATORY:  Denies any new shortness of breath, dyspnea with exertion, cough or congestion.  GASTROINTES
TINAL:  Denies any overt nausea, vomiting or abdominal pain.  Endorses that she has had two small loo
se bowel movements earlier today that were very dark in coloration.  Otherwise, denies any outright d
iarrhea.  MUSCULOSKELETAL:  Mild generalized myalgias and arthralgias as noted above.  The patient de
scribes them as "all over" without any focality.  GENITOURINARY:  Denies any dysuria, changes in urin
omar frequency, quality, quantity or color or odor.

 

PAST MEDICAL HISTORY:  As per HPI and includes the following; systemic lupus erythematosus, insulin-d
ependent type 2 diabetes, collagen vascular disease, and hypertension.

 

PAST SURGICAL HISTORY:  Status post  x2.

 

HOME MEDICATIONS:  Please see the EMR for full details.

 

CURRENT HOME LIST:  Includes the following; hydroxychloroquine sulfate 200 mg p.o. daily, saxagliptin
 5 mg p.o. daily, insulin detemir 15 units subcu b.i.d., glipizide 1 tab p.o. b.i.d., Mycophenolate 1
500 mg p.o. b.i.d., prednisone 10 mg p.o. b.i.d.

 

The patient indicates that her prednisone was recently increased from 10 mg total daily to 20 mg tota
l daily by her primary care provider over the last month.

 

ALLERGIES:  Include TRAMADOL without any noted reaction type.

 

FAMILY HISTORY:  The patient denies any family history of known members of her family with autoimmune
 disease.

 

SOCIAL HISTORY:  Patient is accompanied here by her family and her son.  Denies any alcohol, tobacco 
or illicit drug use.  The patient has multiple tattoos and states that all of them are prior to her d
iagnosis of lupus.

 

PHYSICAL EXAMINATION:

GENERAL:  The patient is awake, alert, conversant, appears uncomfortable, but is in no gael distress
, lying in the hospital bed.  She appears to be a reasonable historian and is able to recall majority
 of her history as noted above.

HEENT:  Normocephalic, atraumatic.  A fair amount of her scalp is also demonstrating erythematous headley
sed, nonblanching change to her scalp with a hair still intact at this point in time, slightly dry mu
cous membranes, significant for oral ulcers, both under her tongue and on her posterior palate.

CARDIOVASCULAR:  S1, S2.  Soft heart tones.  No murmurs, rubs or gallops.  Pulses 2+ bilateral upper 
extremity, no gael pain and pedal edema.

RESPIRATORY:  Reasonable air movement.  No conversational dyspnea.  No wheezes, rales or rhonchi.  Ot
herwise clear to auscultation bilaterally.

ABDOMEN:  Positive bowel sounds, soft, nontender to palpation.

MUSCULOSKELETAL:  Moving all 4 extremities independently.

SKIN:  Notable for several areas of erythema scattered across her bilateral upper extremities across 
her face and her upper chest wall as well.

 

LABORATORY DATA AND IMAGING DATA:  The patient last had a chest x-ray on 2018 with an impressio
n of no acute intrathoracic abnormality.  WBC 3.3, hemoglobin 10.8, hematocrit 31.6, platelets 145.  
Sodium 140, potassium 3.7, chloride 109, bicarb 22, BUN 6, creatinine 0.85, glucose 201, calcium 8.9,
 total bilirubin 0.5, AST 38, ALT 40, alkaline phos 70, total protein 7.5, albumin 3.7.  UA is jak chery.

 

ASSESSMENT AND PLAN:  This is a 41-year-old female with a chief complaint of a lupus flare.

1.  Patient does have a known history of lupus.  She is having signs and symptoms consistent with a p
otential lupus flare.  At this point in time, it appears that the majority of her symptoms are keepin
g in nature.  Her recent chest x-ray does not demonstrate any pleural fluid or pulmonary involvement 
at this point in time.  The patient does have a known prior history of lupus nephritis; however, she 
has stable renal function and no protein noted in her urine either.  There is a question of perhaps w
hether or not she has a GI involvement with characterization.  We will check Hemoccult of stool and t
reat empirically for the lupus flare.  It seems that the patient was recently increased on prednisone
.  We will utilize Solu-Medrol at this point in time and then back down on her steroid usage.  Slowly
 her symptoms are improved.  We will also check ESR, CRP, complement C3 and C4 levels along with a do
uble stranded DNA antibody as well

2.  Insulin-dependent type 2 diabetes.  We will continue the patient on her home regimen with serial 
glucose checks and sliding scale insulin given the anticipated hypoglycemia secondary to IV steroid u
tilization.

3.  Hypertension, currently appears to be stable.  Continue the patient on her home regimen.

4.  Diet as tolerated.

5.  Deep venous thrombosis prophylaxis with Lovenox.  Activity as tolerated.

 

Thank you for asking me to care for the patient.  Questions or concerns, please contact me at [facility]. Intractable vomiting

## 2020-01-20 NOTE — PROGRESS NOTE ADULT - ASSESSMENT
seen and examined  vsstable afebrile physical  unchaged  comfortable on edge of bed  denies abd ppain no vomiting apetite fair  labs noted   k is ok  d/c planning  after    dm  a1c 10  on lantus  diabetic teaching  nutrition eval  1800 kcal  ophthalmo out pt

## 2020-01-21 VITALS
HEART RATE: 86 BPM | DIASTOLIC BLOOD PRESSURE: 45 MMHG | TEMPERATURE: 98 F | RESPIRATION RATE: 18 BRPM | OXYGEN SATURATION: 98 % | SYSTOLIC BLOOD PRESSURE: 105 MMHG

## 2020-01-21 LAB
ANION GAP SERPL CALC-SCNC: 7 MMOL/L — SIGNIFICANT CHANGE UP (ref 5–17)
BUN SERPL-MCNC: 13 MG/DL — SIGNIFICANT CHANGE UP (ref 7–18)
CALCIUM SERPL-MCNC: 8.9 MG/DL — SIGNIFICANT CHANGE UP (ref 8.4–10.5)
CHLORIDE SERPL-SCNC: 98 MMOL/L — SIGNIFICANT CHANGE UP (ref 96–108)
CO2 SERPL-SCNC: 27 MMOL/L — SIGNIFICANT CHANGE UP (ref 22–31)
CREAT SERPL-MCNC: 0.63 MG/DL — SIGNIFICANT CHANGE UP (ref 0.5–1.3)
GLUCOSE BLDC GLUCOMTR-MCNC: 194 MG/DL — HIGH (ref 70–99)
GLUCOSE BLDC GLUCOMTR-MCNC: 299 MG/DL — HIGH (ref 70–99)
GLUCOSE SERPL-MCNC: 237 MG/DL — HIGH (ref 70–99)
HCT VFR BLD CALC: 34.1 % — LOW (ref 39–50)
HGB BLD-MCNC: 12.1 G/DL — LOW (ref 13–17)
MAGNESIUM SERPL-MCNC: 1.8 MG/DL — SIGNIFICANT CHANGE UP (ref 1.6–2.6)
MCHC RBC-ENTMCNC: 34.3 PG — HIGH (ref 27–34)
MCHC RBC-ENTMCNC: 35.5 GM/DL — SIGNIFICANT CHANGE UP (ref 32–36)
MCV RBC AUTO: 96.6 FL — SIGNIFICANT CHANGE UP (ref 80–100)
NRBC # BLD: 0 /100 WBCS — SIGNIFICANT CHANGE UP (ref 0–0)
PHOSPHATE SERPL-MCNC: 2.7 MG/DL — SIGNIFICANT CHANGE UP (ref 2.5–4.5)
PLATELET # BLD AUTO: 143 K/UL — LOW (ref 150–400)
POTASSIUM SERPL-MCNC: 3.9 MMOL/L — SIGNIFICANT CHANGE UP (ref 3.5–5.3)
POTASSIUM SERPL-SCNC: 3.9 MMOL/L — SIGNIFICANT CHANGE UP (ref 3.5–5.3)
RBC # BLD: 3.53 M/UL — LOW (ref 4.2–5.8)
RBC # FLD: 11.5 % — SIGNIFICANT CHANGE UP (ref 10.3–14.5)
SODIUM SERPL-SCNC: 132 MMOL/L — LOW (ref 135–145)
WBC # BLD: 4.52 K/UL — SIGNIFICANT CHANGE UP (ref 3.8–10.5)
WBC # FLD AUTO: 4.52 K/UL — SIGNIFICANT CHANGE UP (ref 3.8–10.5)

## 2020-01-21 PROCEDURE — 85730 THROMBOPLASTIN TIME PARTIAL: CPT

## 2020-01-21 PROCEDURE — 82248 BILIRUBIN DIRECT: CPT

## 2020-01-21 PROCEDURE — 96376 TX/PRO/DX INJ SAME DRUG ADON: CPT

## 2020-01-21 PROCEDURE — 99231 SBSQ HOSP IP/OBS SF/LOW 25: CPT

## 2020-01-21 PROCEDURE — 83735 ASSAY OF MAGNESIUM: CPT

## 2020-01-21 PROCEDURE — 86901 BLOOD TYPING SEROLOGIC RH(D): CPT

## 2020-01-21 PROCEDURE — 86803 HEPATITIS C AB TEST: CPT

## 2020-01-21 PROCEDURE — 71045 X-RAY EXAM CHEST 1 VIEW: CPT

## 2020-01-21 PROCEDURE — 96374 THER/PROPH/DIAG INJ IV PUSH: CPT

## 2020-01-21 PROCEDURE — 80076 HEPATIC FUNCTION PANEL: CPT

## 2020-01-21 PROCEDURE — 82009 KETONE BODYS QUAL: CPT

## 2020-01-21 PROCEDURE — 80061 LIPID PANEL: CPT

## 2020-01-21 PROCEDURE — 83036 HEMOGLOBIN GLYCOSYLATED A1C: CPT

## 2020-01-21 PROCEDURE — 81001 URINALYSIS AUTO W/SCOPE: CPT

## 2020-01-21 PROCEDURE — 36415 COLL VENOUS BLD VENIPUNCTURE: CPT

## 2020-01-21 PROCEDURE — 85610 PROTHROMBIN TIME: CPT

## 2020-01-21 PROCEDURE — 86900 BLOOD TYPING SEROLOGIC ABO: CPT

## 2020-01-21 PROCEDURE — 80307 DRUG TEST PRSMV CHEM ANLYZR: CPT

## 2020-01-21 PROCEDURE — 80048 BASIC METABOLIC PNL TOTAL CA: CPT

## 2020-01-21 PROCEDURE — 93005 ELECTROCARDIOGRAM TRACING: CPT

## 2020-01-21 PROCEDURE — 83605 ASSAY OF LACTIC ACID: CPT

## 2020-01-21 PROCEDURE — 82962 GLUCOSE BLOOD TEST: CPT

## 2020-01-21 PROCEDURE — 85027 COMPLETE CBC AUTOMATED: CPT

## 2020-01-21 PROCEDURE — 70450 CT HEAD/BRAIN W/O DYE: CPT

## 2020-01-21 PROCEDURE — 87340 HEPATITIS B SURFACE AG IA: CPT

## 2020-01-21 PROCEDURE — 84484 ASSAY OF TROPONIN QUANT: CPT

## 2020-01-21 PROCEDURE — 82803 BLOOD GASES ANY COMBINATION: CPT

## 2020-01-21 PROCEDURE — 80053 COMPREHEN METABOLIC PANEL: CPT

## 2020-01-21 PROCEDURE — 84100 ASSAY OF PHOSPHORUS: CPT

## 2020-01-21 PROCEDURE — 99285 EMERGENCY DEPT VISIT HI MDM: CPT | Mod: 25

## 2020-01-21 PROCEDURE — 74177 CT ABD & PELVIS W/CONTRAST: CPT

## 2020-01-21 PROCEDURE — 83690 ASSAY OF LIPASE: CPT

## 2020-01-21 PROCEDURE — 86850 RBC ANTIBODY SCREEN: CPT

## 2020-01-21 RX ORDER — METFORMIN HYDROCHLORIDE 850 MG/1
2 TABLET ORAL
Qty: 120 | Refills: 0
Start: 2020-01-21 | End: 2020-02-19

## 2020-01-21 RX ADMIN — Medication 0.5 MILLIGRAM(S): at 05:39

## 2020-01-21 RX ADMIN — Medication 6: at 12:44

## 2020-01-21 RX ADMIN — Medication 1 MILLIGRAM(S): at 12:42

## 2020-01-21 RX ADMIN — Medication 3 UNIT(S): at 12:41

## 2020-01-21 RX ADMIN — Medication 1 TABLET(S): at 12:42

## 2020-01-21 RX ADMIN — Medication 2: at 09:01

## 2020-01-21 RX ADMIN — Medication 3 UNIT(S): at 09:01

## 2020-01-21 NOTE — PROGRESS NOTE ADULT - PROBLEM SELECTOR PLAN 3
Pt known diabetic, Hba1c >10   insulin adjusted  Seen by endo - Dr. Brooks  Will dc on metformin  SW for medications and follow up appt

## 2020-01-21 NOTE — SBIRT NOTE ADULT - NSSBIRTBRIEFINTDET_GEN_A_CORE
Declined resources and reports he has all of them from prior admission; stated he would "stop drinking completely" on his own

## 2020-01-21 NOTE — DISCHARGE NOTE NURSING/CASE MANAGEMENT/SOCIAL WORK - PATIENT PORTAL LINK FT
You can access the FollowMyHealth Patient Portal offered by [facility] by registering at the following website: [email or web address]. By joining [facility]’s FollowMyHealth portal, you will also be able to view your health information using other applications (apps) compatible with our system.

## 2020-01-21 NOTE — PROGRESS NOTE ADULT - SUBJECTIVE AND OBJECTIVE BOX
HPI:  Pt is a 63 y/o M pt with a PMHx of DM, ETOH dependence with last drink yesterday who presents to the ED with complaints of vomiting for several days. Notes he is unable to keep anything down but has been drinking alcohol everyday, last drink was yesterday. NKDA. Pt unable to provide much history stating that his nausea is very bad. Pt persistently vomiting in ED. (16 Jan 2020 15:30)      Patient is a 64y old  Male who presents with a chief complaint of Vomiting and abdominal pain (21 Jan 2020 10:26)      INTERVAL HPI/OVERNIGHT EVENTS:  T(C): 36.7 (01-21-20 @ 05:08), Max: 36.8 (01-20-20 @ 21:30)  HR: 87 (01-21-20 @ 05:08) (87 - 88)  BP: 101/61 (01-21-20 @ 05:08) (101/61 - 113/73)  RR: 18 (01-21-20 @ 05:08) (18 - 18)  SpO2: 97% (01-21-20 @ 05:08) (97% - 98%)  Wt(kg): --  I&O's Summary      REVIEW OF SYSTEMS: denies fever, chills, SOB, palpitations, chest pain, abdominal pain, nausea, vomitting, diarrhea, constipation, dizziness    MEDICATIONS  (STANDING):  dextrose 5% + sodium chloride 0.45%. 1000 milliLiter(s) (100 mL/Hr) IV Continuous <Continuous>  folic acid Injectable 1 milliGRAM(s) IV Push daily  insulin glargine Injectable (LANTUS) 10 Unit(s) SubCutaneous at bedtime  insulin lispro (HumaLOG) corrective regimen sliding scale   SubCutaneous three times a day before meals  insulin lispro Injectable (HumaLOG) 3 Unit(s) SubCutaneous three times a day before meals  LORazepam     Tablet 0.5 milliGRAM(s) Oral three times a day  multivitamin/minerals 1 Tablet(s) Oral daily    MEDICATIONS  (PRN):  acetaminophen   Tablet .. 650 milliGRAM(s) Oral every 6 hours PRN Mild Pain (1 - 3)  glucagon  Injectable 1 milliGRAM(s) IntraMuscular once PRN Glucose LESS THAN 70 milligrams/deciliter  ondansetron    Tablet 4 milliGRAM(s) Oral every 8 hours PRN Nausea and/or Vomiting  ondansetron Injectable 4 milliGRAM(s) IV Push every 4 hours PRN Vomiting      PHYSICAL EXAM:  GENERAL: NAD, well-groomed, well-developed  HEAD:  Atraumatic, Normocephalic  EYES: EOMI, PERRLA, conjunctiva and sclera clear  ENMT: No tonsillar erythema, exudates, or enlargement; Moist mucous membranes, Good dentition, No lesions  NECK: Supple, No JVD, Normal thyroid  NERVOUS SYSTEM:  Alert & Oriented X3, Good concentration; Motor Strength 5/5 B/L upper and lower extremities; DTRs 2+ intact and symmetric  CHEST/LUNG: Clear to percussion bilaterally; No rales, rhonchi, wheezing, or rubs  HEART: Regular rate and rhythm; No murmurs, rubs, or gallops  ABDOMEN: Soft, Nontender, Nondistended; Bowel sounds present  EXTREMITIES:  2+ Peripheral Pulses, No clubbing, cyanosis, or edema  LYMPH: No lymphadenopathy noted  SKIN: No rashes or lesions  LABS:                        12.1   4.52  )-----------( 143      ( 21 Jan 2020 06:39 )             34.1     01-21    132<L>  |  98  |  13  ----------------------------<  237<H>  3.9   |  27  |  0.63    Ca    8.9      21 Jan 2020 06:39  Phos  2.7     01-21  Mg     1.8     01-21    TPro  7.4  /  Alb  3.3<L>  /  TBili  0.9  /  DBili  x   /  AST  152<H>  /  ALT  168<H>  /  AlkPhos  112  01-20        CAPILLARY BLOOD GLUCOSE      POCT Blood Glucose.: 299 mg/dL (21 Jan 2020 12:06)  POCT Blood Glucose.: 194 mg/dL (21 Jan 2020 08:09)  POCT Blood Glucose.: 280 mg/dL (20 Jan 2020 21:36)  POCT Blood Glucose.: 170 mg/dL (20 Jan 2020 16:38)

## 2020-01-21 NOTE — PROGRESS NOTE ADULT - SUBJECTIVE AND OBJECTIVE BOX
PGY1 Note discussed with supervising resident and primary attending.    Patient is a 64y old  Male who presents with a chief complaint of Vomiting and abdominal pain (2020 15:30)      INTERVAL HPI/OVERNIGHT EVENTS:  Pt resting comfortably. seen and examined at bedside  Oriented x3  Overnight CIWA 3, ativan tapered to .5mg oral q3.  labs normalizing  elytes replaced  Hba1c noted >10, seen by endo  Patient cannot afford meds and does not have insurance  s/w pending     MEDICATIONS  (STANDING):  dextrose 5% + sodium chloride 0.45%. 1000 milliLiter(s) (100 mL/Hr) IV Continuous <Continuous>  folic acid Injectable 1 milliGRAM(s) IV Push daily  insulin glargine Injectable (LANTUS) 10 Unit(s) SubCutaneous at bedtime  insulin lispro (HumaLOG) corrective regimen sliding scale   SubCutaneous three times a day before meals  insulin lispro Injectable (HumaLOG) 3 Unit(s) SubCutaneous three times a day before meals  LORazepam     Tablet 0.5 milliGRAM(s) Oral three times a day  multivitamin/minerals 1 Tablet(s) Oral daily    MEDICATIONS  (PRN):  acetaminophen   Tablet .. 650 milliGRAM(s) Oral every 6 hours PRN Mild Pain (1 - 3)  glucagon  Injectable 1 milliGRAM(s) IntraMuscular once PRN Glucose LESS THAN 70 milligrams/deciliter  ondansetron    Tablet 4 milliGRAM(s) Oral every 8 hours PRN Nausea and/or Vomiting  ondansetron Injectable 4 milliGRAM(s) IV Push every 4 hours PRN Vomiting          Allergies    No Known Allergies    Intolerances        REVIEW OF SYSTEMS:  CONSTITUTIONAL: No fever, weight loss, or fatigue  RESPIRATORY: No cough, wheezing, chills or hemoptysis; No shortness of breath  CARDIOVASCULAR: No chest pain, palpitations, dizziness, or leg swelling  GASTROINTESTINAL: No abdominal or epigastric pain. No nausea, vomiting, or hematemesis; No diarrhea or constipation. No melena or hematochezia.  NEUROLOGICAL: No headaches, memory loss, loss of strength, numbness, or tremors  SKIN: No itching, burning, rashes, or lesions     Vital Signs Last 24 Hrs  T(C): 36.7 (2020 05:08), Max: 36.8 (2020 13:23)  T(F): 98 (2020 05:08), Max: 98.2 (2020 13:23)  HR: 87 (2020 05:08) (87 - 88)  BP: 101/61 (2020 05:08) (101/61 - 113/73)  BP(mean): --  RR: 18 (2020 05:08) (17 - 18)  SpO2: 97% (2020 05:08) (97% - 98%)    PHYSICAL EXAM:  	GENERAL: Drowsy, responding to verbal stimuli and following commands  	HEAD:  Atraumatic, Normocephalic  	EYES: EOMI, PERRLA, conjunctiva and sclera clear  	NECK: Supple, No JVD  	CHEST/LUNG: Clear to auscultation bilaterally; No wheeze; No crackles; No accessory muscles used  	HEART: Regular rate and rhythm; No murmurs;   	ABDOMEN: Soft, Mild tenderness in epigastric region, BS+ve  	EXTREMITIES:  2+ Peripheral Pulses, No cyanosis or edema  	PSYCH: AAOx3  	NEUROLOGY: non-focal  SKIN: No rashes or lesions    LABS:                                                           12.1   4.52  )-----------( 143      ( 2020 06:39 )             34.1             01-21    132<L>  |  98  |  13  ----------------------------<  237<H>  3.9   |  27  |  0.63    Ca    8.9      2020 06:39  Phos  2.7     01-21  Mg     1.8     -21    TPro  7.4  /  Alb  3.3<L>  /  TBili  0.9  /  DBili  x   /  AST  152<H>  /  ALT  168<H>  /  AlkPhos  112  01-20        Urinalysis Basic - ( 2020 13:18 )    Color: Yellow / Appearance: Clear / S.005 / pH: x  Gluc: x / Ketone: Negative  / Bili: Negative / Urobili: Negative   Blood: x / Protein: Negative / Nitrite: Negative   Leuk Esterase: Trace / RBC: 0-2 /HPF / WBC 0-2 /HPF   Sq Epi: x / Non Sq Epi: Occasional /HPF / Bacteria: Few /HPF      CAPILLARY BLOOD GLUCOSE      POCT Blood Glucose.: 226 mg/dL (2020 06:54)  POCT Blood Glucose.: 278 mg/dL (2020 23:08)      RADIOLOGY & ADDITIONAL TESTS:    Imaging Personally Reviewed:  [ ] YES  [ ] NO    Consultant(s) Notes Reviewed:  [ ] YES  [ ] NO

## 2020-01-21 NOTE — PROGRESS NOTE ADULT - PROBLEM SELECTOR PLAN 1
Pt with active alcohol use  Labs show resolution. Vomiting and nausea improving  c/w IVF, Monitor bmp   Anion gap closed  Monitor electrolytes  Ativan tapered

## 2020-01-21 NOTE — PROGRESS NOTE ADULT - SUBJECTIVE AND OBJECTIVE BOX
Pt is a 64  year old [place] male with PMH of type 2 DM (dx 4 years ago) and ETOH dependence presented with complaint of vomiting. Endocrinology was consulted for management of uncontrolled type 2 DM with hyperglycemia, A1c 10.0%.    Patient seen and examined at bedside. He is feeling well overall. He has not had any episodes of vomiting and is tolerating PO. FS remains above goal >200 despite Lantus 10 units at bedtime and Humalog 3 units TID with meals. Patient states he has a difficult social situation as he is currently living with his daughter in law but she wants him to leave. He may be homeless following this admission and states he will not be able to afford medications. Plan for discharge home today as per primary team. [facility] to provide medications on discharge.    MEDICATIONS  (STANDING):  dextrose 5% + sodium chloride 0.45%. 1000 milliLiter(s) (100 mL/Hr) IV Continuous <Continuous>  folic acid Injectable 1 milliGRAM(s) IV Push daily  insulin glargine Injectable (LANTUS) 10 Unit(s) SubCutaneous at bedtime  insulin lispro (HumaLOG) corrective regimen sliding scale   SubCutaneous three times a day before meals  insulin lispro Injectable (HumaLOG) 3 Unit(s) SubCutaneous three times a day before meals  LORazepam     Tablet 0.5 milliGRAM(s) Oral three times a day  multivitamin/minerals 1 Tablet(s) Oral daily    MEDICATIONS  (PRN):  acetaminophen   Tablet .. 650 milliGRAM(s) Oral every 6 hours PRN Mild Pain (1 - 3)  glucagon  Injectable 1 milliGRAM(s) IntraMuscular once PRN Glucose LESS THAN 70 milligrams/deciliter  ondansetron    Tablet 4 milliGRAM(s) Oral every 8 hours PRN Nausea and/or Vomiting  ondansetron Injectable 4 milliGRAM(s) IV Push every 4 hours PRN Vomiting      REVIEW OF SYSTEMS:  CONSTITUTIONAL: No fever, weight loss, or fatigue  EYES: No eye pain, visual disturbances, or discharge  ENMT:  No difficulty hearing, tinnitus, vertigo; No sinus or throat pain  NECK: No pain or stiffness  RESPIRATORY: No cough, wheezing, chills or hemoptysis; No shortness of breath  CARDIOVASCULAR: No chest pain, palpitations, dizziness, or leg swelling  GASTROINTESTINAL: No abdominal or epigastric pain. No nausea, vomiting, or hematemesis; No diarrhea or constipation. No melena or hematochezia.  GENITOURINARY: No dysuria, frequency, hematuria, or incontinence  NEUROLOGICAL: No headaches, memory loss, loss of strength, numbness, or tremors  SKIN: No itching, burning, rashes, or lesions   LYMPH NODES: No enlarged glands  ENDOCRINE: No heat or cold intolerance; No hair loss  MUSCULOSKELETAL: No joint pain or swelling; No muscle, back, or extremity pain  PSYCHIATRIC: No depression, anxiety, mood swings, or difficulty sleeping  HEME/LYMPH: No easy bruising, or bleeding gums  ALLERGY AND IMMUNOLOGIC: No hives or eczema      PHYSICAL EXAM:    VITAL SIGNS  T(C): 36.7 (01-21-20 @ 05:08), Max: 36.8 (01-20-20 @ 13:23)  HR: 87 (01-21-20 @ 05:08) (87 - 88)  BP: 101/61 (01-21-20 @ 05:08) (101/61 - 113/73)  RR: 18 (01-21-20 @ 05:08) (17 - 18)  SpO2: 97% (01-21-20 @ 05:08) (97% - 98%)    General: Alert and oriented. No acute distress.   Eye: Extraocular movements are intact.    HENT:  Normocephalic.    Respiratory: Respirations are non-labored, Symmetrical chest wall expansion.    Gastrointestinal:  Non-distended.    Neurologic:  Alert and oriented X 3, No focal defects,  Psychiatric:  Cooperative, Appropriate mood & affect.  SKIN: No rashes or lesions      LABS:                        12.1   4.52  )-----------( 143      ( 21 Jan 2020 06:39 )             34.1     01-21    132<L>  |  98  |  13  ----------------------------<  237<H>  3.9   |  27  |  0.63    Ca    8.9      21 Jan 2020 06:39  Phos  2.7     01-21  Mg     1.8     01-21    TPro  7.4  /  Alb  3.3<L>  /  TBili  0.9  /  DBili  x   /  AST  152<H>  /  ALT  168<H>  /  AlkPhos  112  01-20        CAPILLARY BLOOD GLUCOSE      POCT Blood Glucose.: 194 mg/dL (21 Jan 2020 08:09)  POCT Blood Glucose.: 280 mg/dL (20 Jan 2020 21:36)  POCT Blood Glucose.: 170 mg/dL (20 Jan 2020 16:38)  POCT Blood Glucose.: 276 mg/dL (20 Jan 2020 11:46)

## 2020-01-21 NOTE — PROGRESS NOTE ADULT - ASSESSMENT
Pt is a 64  year old [place] male with PMH of type 2 DM (dx 4 years ago) and ETOH dependence presented with complaint of vomiting. Endocrinology was consulted for management of uncontrolled type 2 DM with hyperglycemia, A1c 10.0%.    1. Uncontrolled type 2 DM with hyperglycemia  -A1c 10.0% secondary to medication noncompliance   -plan for discharge home today as per primary team  -FS AC HS  -ensure consistent carbohydrate diet   -will monitor FS and adjust accordingly   -Given difficult social situation and financial instability, the patient is a poor candidate for insulin use. Recommend discharge on Metformin 1000mg PO BID. [facility] to provide these medications on discharge. Patient agreed to follow up at [facility] clinic (was previously noncompliant with visits).   -pending social work     Plan discussed with primary team  Please  call  w/ any questions or concerns [phone]

## 2020-01-21 NOTE — PROGRESS NOTE ADULT - ASSESSMENT
slava nd examined vsstabel afebrile  physical unchaged  no cp or sob   no abd pain   apetite good  no tremers  abd non tender   d/c planning thru socail services    apooint/f/u clinic  medicine   endo as out pt  endo   avoid etoh  no ativan only vitamins

## 2020-01-21 NOTE — PROGRESS NOTE ADULT - REASON FOR ADMISSION
Vomiting and abdominal pain

## 2020-03-16 ENCOUNTER — INPATIENT (INPATIENT)
Facility: HOSPITAL | Age: 65
LOS: 2 days | Discharge: HOME | DRG: 897 | End: 2020-03-19
Attending: INTERNAL MEDICINE | Admitting: INTERNAL MEDICINE
Payer: MEDICAID

## 2020-03-16 VITALS
RESPIRATION RATE: 19 BRPM | SYSTOLIC BLOOD PRESSURE: 128 MMHG | HEART RATE: 115 BPM | WEIGHT: 160.06 LBS | DIASTOLIC BLOOD PRESSURE: 80 MMHG | HEIGHT: 64 IN | OXYGEN SATURATION: 95 % | TEMPERATURE: 98 F

## 2020-03-16 DIAGNOSIS — F10.239 ALCOHOL DEPENDENCE WITH WITHDRAWAL, UNSPECIFIED: ICD-10-CM

## 2020-03-16 LAB
ALBUMIN SERPL ELPH-MCNC: 4 G/DL — SIGNIFICANT CHANGE UP (ref 3.5–5)
ALP SERPL-CCNC: 103 U/L — SIGNIFICANT CHANGE UP (ref 40–120)
ALT FLD-CCNC: 53 U/L DA — SIGNIFICANT CHANGE UP (ref 10–60)
ANION GAP SERPL CALC-SCNC: 13 MMOL/L — SIGNIFICANT CHANGE UP (ref 5–17)
AST SERPL-CCNC: 60 U/L — HIGH (ref 10–40)
BASOPHILS # BLD AUTO: 0.03 K/UL — SIGNIFICANT CHANGE UP (ref 0–0.2)
BASOPHILS NFR BLD AUTO: 0.4 % — SIGNIFICANT CHANGE UP (ref 0–2)
BILIRUB SERPL-MCNC: 0.5 MG/DL — SIGNIFICANT CHANGE UP (ref 0.2–1.2)
BUN SERPL-MCNC: 9 MG/DL — SIGNIFICANT CHANGE UP (ref 7–18)
CALCIUM SERPL-MCNC: 8.5 MG/DL — SIGNIFICANT CHANGE UP (ref 8.4–10.5)
CHLORIDE SERPL-SCNC: 103 MMOL/L — SIGNIFICANT CHANGE UP (ref 96–108)
CO2 SERPL-SCNC: 25 MMOL/L — SIGNIFICANT CHANGE UP (ref 22–31)
CREAT SERPL-MCNC: 0.86 MG/DL — SIGNIFICANT CHANGE UP (ref 0.5–1.3)
EOSINOPHIL # BLD AUTO: 0.04 K/UL — SIGNIFICANT CHANGE UP (ref 0–0.5)
EOSINOPHIL NFR BLD AUTO: 0.6 % — SIGNIFICANT CHANGE UP (ref 0–6)
ETHANOL SERPL-MCNC: 221 MG/DL — HIGH (ref 0–10)
GLUCOSE SERPL-MCNC: 134 MG/DL — HIGH (ref 70–99)
HCT VFR BLD CALC: 45.2 % — SIGNIFICANT CHANGE UP (ref 39–50)
HGB BLD-MCNC: 15.6 G/DL — SIGNIFICANT CHANGE UP (ref 13–17)
IMM GRANULOCYTES NFR BLD AUTO: 0.1 % — SIGNIFICANT CHANGE UP (ref 0–1.5)
LYMPHOCYTES # BLD AUTO: 3.02 K/UL — SIGNIFICANT CHANGE UP (ref 1–3.3)
LYMPHOCYTES # BLD AUTO: 41.8 % — SIGNIFICANT CHANGE UP (ref 13–44)
MAGNESIUM SERPL-MCNC: 2.1 MG/DL — SIGNIFICANT CHANGE UP (ref 1.6–2.6)
MCHC RBC-ENTMCNC: 34.4 PG — HIGH (ref 27–34)
MCHC RBC-ENTMCNC: 34.5 GM/DL — SIGNIFICANT CHANGE UP (ref 32–36)
MCV RBC AUTO: 99.6 FL — SIGNIFICANT CHANGE UP (ref 80–100)
MONOCYTES # BLD AUTO: 0.27 K/UL — SIGNIFICANT CHANGE UP (ref 0–0.9)
MONOCYTES NFR BLD AUTO: 3.7 % — SIGNIFICANT CHANGE UP (ref 2–14)
NEUTROPHILS # BLD AUTO: 3.86 K/UL — SIGNIFICANT CHANGE UP (ref 1.8–7.4)
NEUTROPHILS NFR BLD AUTO: 53.4 % — SIGNIFICANT CHANGE UP (ref 43–77)
NRBC # BLD: 0 /100 WBCS — SIGNIFICANT CHANGE UP (ref 0–0)
PHOSPHATE SERPL-MCNC: 4.1 MG/DL — SIGNIFICANT CHANGE UP (ref 2.5–4.5)
PLATELET # BLD AUTO: 239 K/UL — SIGNIFICANT CHANGE UP (ref 150–400)
POTASSIUM SERPL-MCNC: 4 MMOL/L — SIGNIFICANT CHANGE UP (ref 3.5–5.3)
POTASSIUM SERPL-SCNC: 4 MMOL/L — SIGNIFICANT CHANGE UP (ref 3.5–5.3)
PROT SERPL-MCNC: 9.8 G/DL — HIGH (ref 6–8.3)
RBC # BLD: 4.54 M/UL — SIGNIFICANT CHANGE UP (ref 4.2–5.8)
RBC # FLD: 12.9 % — SIGNIFICANT CHANGE UP (ref 10.3–14.5)
SODIUM SERPL-SCNC: 141 MMOL/L — SIGNIFICANT CHANGE UP (ref 135–145)
WBC # BLD: 7.23 K/UL — SIGNIFICANT CHANGE UP (ref 3.8–10.5)
WBC # FLD AUTO: 7.23 K/UL — SIGNIFICANT CHANGE UP (ref 3.8–10.5)

## 2020-03-16 PROCEDURE — 99285 EMERGENCY DEPT VISIT HI MDM: CPT

## 2020-03-16 PROCEDURE — 99222 1ST HOSP IP/OBS MODERATE 55: CPT

## 2020-03-16 RX ORDER — THIAMINE MONONITRATE (VIT B1) 100 MG
100 TABLET ORAL ONCE
Refills: 0 | Status: COMPLETED | OUTPATIENT
Start: 2020-03-16 | End: 2020-03-16

## 2020-03-16 RX ORDER — SODIUM CHLORIDE 9 MG/ML
1000 INJECTION, SOLUTION INTRAVENOUS ONCE
Refills: 0 | Status: COMPLETED | OUTPATIENT
Start: 2020-03-16 | End: 2020-03-16

## 2020-03-16 RX ADMIN — Medication 100 MILLIGRAM(S): at 23:49

## 2020-03-16 RX ADMIN — SODIUM CHLORIDE 1000 MILLILITER(S): 9 INJECTION, SOLUTION INTRAVENOUS at 23:49

## 2020-03-16 RX ADMIN — Medication 2 MILLIGRAM(S): at 20:41

## 2020-03-16 NOTE — H&P ADULT - ATTENDING COMMENTS
Vital Signs Last 24 Hrs  T(C): 36.9 (16 Mar 2020 19:56), Max: 36.9 (16 Mar 2020 19:56)  T(F): 98.4 (16 Mar 2020 19:56), Max: 98.4 (16 Mar 2020 19:56)  HR: 115 (16 Mar 2020 19:56) (115 - 115)  BP: 128/80 (16 Mar 2020 19:56) (128/80 - 128/80)  BP(mean): --RR: 19 (16 Mar 2020 19:56) (19 - 19)  SpO2: 95% (16 Mar 2020 19:56) (95% - 95%) Pt seen and examined  Case discussed with Housestaff  64 year old man with PMH of DM, alcohol abuse presenting with tremors of his extremities. There is no confusion, no fevers, no additional symptoms.    Vital Signs Last 24 Hrs  T(C): 36.9 (16 Mar 2020 19:56), Max: 36.9 (16 Mar 2020 19:56)  T(F): 98.4 (16 Mar 2020 19:56), Max: 98.4 (16 Mar 2020 19:56)  HR: 115 (16 Mar 2020 19:56) (115 - 115)  BP: 128/80 (16 Mar 2020 19:56) (128/80 - 128/80)  RR: 19 (16 Mar 2020 19:56) (19 - 19)  SpO2: 95% (16 Mar 2020 19:56) (95% - 95%)    Middle aged man, NAD AAO X 3  CTA B/L RRR S1S2 only; no tachy  Soft NT ND BS +  Tremors-fine of the outstretched hands  NO tongue fasciculations  NO focal deficits; no pedal edema    Labs                        15.6   7.23  )-----------( 239      ( 16 Mar 2020 20:57 )             45.2     03-16    141  |  103  |  9   ----------------------------<  134<H>  4.0   |  25  |  0.86    Ca    8.5      16 Mar 2020 20:57  Phos  4.1     03-16  Mg     2.1     03-16    TPro  9.8<H>  /  Alb  4.0  /  TBili  0.5  /  DBili  x   /  AST  60<H>  /  ALT  53  /  AlkPhos  103  03-16    EKG - noted    Impression  64 year old man with PMH as above here in alcohol withdrawal.    A/P  Alcohol withdrawal  -Admit to tele  Close monitoring  CIWA-Ar protocol RTC; BDZ prn  Thiamine supplements; MVI as well  IVF hydration  Replace electrolyte deficits  Advance diet as tolerated    DM 2  - check A1c   - Insulin tx inpatient; basal and corrective    Alcohol cessation counseling

## 2020-03-16 NOTE — H&P ADULT - PROBLEM SELECTOR PLAN 1
Labs significant for high Alcohol level : 221  ECG : first degree heart block, sinus rhythm   Will Manage with Ativan PRN 2mg q2 hrs and Ativan standing dose  Will Manage with Thiamine, Folate, Multivitamins, vitamin B12  Will reduce ativan dosages as possible.  NPO, c/w IVF, Monitor bmp   f/u bmp, lactate, magnesium and phosphorus

## 2020-03-16 NOTE — ED PROVIDER NOTE - PROGRESS NOTE DETAILS
pt states he has not had etoh on re-questioning but he is tachy / hypertensive / hi ciwa, alc 210. prob pt has very high tolerance. ciwa 17 ->2. discussed the case with the admitting MD who accepts the patient for admission

## 2020-03-16 NOTE — H&P ADULT - PROBLEM SELECTOR PLAN 3
c/w Lovenox 40 mg sq qd [] Previous VTE                                                3  [] Thrombophilia                                             2  [] Lower limb paralysis                                   2    [] Current Cancer                                             2   [x] Immobilization > 24 hrs                              1  [] ICU/CCU stay > 24 hours                             1  [x] Age > 60                                                         1    IMPROVE VTE Score: 2; Lovenox subq for DVT prophy

## 2020-03-16 NOTE — H&P ADULT - ASSESSMENT
63 y/o M pt with a PMHx of DM (on metformin 1000 BID/ repaglinide 1mgTID), EtOH dependence, presents to the ED for anxiety and shakiness in hands.  Patient reportedly  drank heavily for the last 3 days, as much as 1.5L not Vodka per day.  Last drink was on Saturday. Patient admitted for alcohol toxicity.

## 2020-03-16 NOTE — ED PROVIDER NOTE - CARE PLAN
Principal Discharge DX:	Alcohol withdrawal syndrome without complication  Secondary Diagnosis:	Alcohol use disorder, severe, dependence

## 2020-03-16 NOTE — H&P ADULT - HISTORY OF PRESENT ILLNESS
65 y/o M pt with a PMHx of DM, EtOH dependence, presents to the ED for alcohol withdrawal. Patient reports he drank heavily for the last 3 days, as much as 1.5L per day. Patient is anxious and shaky. Does not want to tell me when his last drink was. NKDA 65 y/o M pt with a PMHx of DM (on metformin 1000 BID/ repaglinide 1mgTID), EtOH dependence, presents to the ED for anxiety and shakiness in hands.  Patient reportedly  drank heavily for the last 3 days, as much as 1.5L not Vodka per day.  Last drink was on Saturday. patient has h/o alcohol dependnece for 30 years. Last time attended to an alcohol cessation program was more than 20 years ago. Patient denies h/o liver cirrhosis or esophageal varicosis. On my evaluation pt is AOx3, appears mildly anxious, moderate tremor with arm extend, denies HA/N/V, no auditory, visual or tactile hallucination. denies difficulty swallowing.    GOC discussed with halley. FULL CODE 65 y/o M pt with a PMHx of DM (on metformin 1000 BID/ repaglinide 1mgTID), EtOH dependence, presents to the ED for anxiety and shakiness in hands.  Patient reportedly  drank heavily for the last 3 days, as much as 1.5L Vodka per day.  Last drink was on Saturday. patient has h/o alcohol dependnece for 30 years. Last time attended to an alcohol cessation program was more than 20 years ago. Patient denies h/o liver cirrhosis or esophageal varicosis. On my evaluation pt is AOx3, appears mildly anxious, moderate tremor with arm extend, denies HA/N/V, no auditory, visual or tactile hallucination. denies difficulty swallowing.    GOC discussed with halley. FULL CODE

## 2020-03-16 NOTE — ED PROVIDER NOTE - OBJECTIVE STATEMENT
65 y/o M pt with a PMHx of DM, EtOH dependence, presents to the ED for alcohol withdrawal. Patient reports he drank heavily for the last 3 days, as much as 1.5L per day. Patient is anxious and shaky. Does not want to tell me when his last drink was. NKDA.

## 2020-03-16 NOTE — ED ADULT NURSE NOTE - NSIMPLEMENTINTERV_GEN_ALL_ED
Implemented All Fall Risk Interventions:  [place] to call system. Call bell, personal items and telephone within reach. Instruct patient to call for assistance. Room bathroom lighting operational. Non-slip footwear when patient is off stretcher. Physically safe environment: no spills, clutter or unnecessary equipment. Stretcher in lowest position, wheels locked, appropriate side rails in place. Provide visual cue, wrist band, yellow gown, etc. Monitor gait and stability. Monitor for mental status changes and reorient to person, place, and time. Review medications for side effects contributing to fall risk. Reinforce activity limits and safety measures with patient and family.

## 2020-03-16 NOTE — ED PROVIDER NOTE - CONSTITUTIONAL, MLM
normal... Tremulous appearing, awake, alert, oriented to person, place, time/situation and in no apparent distress.

## 2020-03-17 DIAGNOSIS — F10.230 ALCOHOL DEPENDENCE WITH WITHDRAWAL, UNCOMPLICATED: ICD-10-CM

## 2020-03-17 DIAGNOSIS — Z29.9 ENCOUNTER FOR PROPHYLACTIC MEASURES, UNSPECIFIED: ICD-10-CM

## 2020-03-17 DIAGNOSIS — E11.9 TYPE 2 DIABETES MELLITUS WITHOUT COMPLICATIONS: ICD-10-CM

## 2020-03-17 DIAGNOSIS — F10.20 ALCOHOL DEPENDENCE, UNCOMPLICATED: ICD-10-CM

## 2020-03-17 LAB
A1C WITH ESTIMATED AVERAGE GLUCOSE RESULT: 7.6 % — HIGH (ref 4–5.6)
ALBUMIN SERPL ELPH-MCNC: 3.3 G/DL — LOW (ref 3.5–5)
ALP SERPL-CCNC: 84 U/L — SIGNIFICANT CHANGE UP (ref 40–120)
ALT FLD-CCNC: 40 U/L DA — SIGNIFICANT CHANGE UP (ref 10–60)
AMPHET UR-MCNC: NEGATIVE — SIGNIFICANT CHANGE UP
ANION GAP SERPL CALC-SCNC: 10 MMOL/L — SIGNIFICANT CHANGE UP (ref 5–17)
APPEARANCE UR: CLEAR — SIGNIFICANT CHANGE UP
AST SERPL-CCNC: 47 U/L — HIGH (ref 10–40)
BARBITURATES UR SCN-MCNC: NEGATIVE — SIGNIFICANT CHANGE UP
BENZODIAZ UR-MCNC: NEGATIVE — SIGNIFICANT CHANGE UP
BILIRUB SERPL-MCNC: 0.9 MG/DL — SIGNIFICANT CHANGE UP (ref 0.2–1.2)
BILIRUB UR-MCNC: NEGATIVE — SIGNIFICANT CHANGE UP
BUN SERPL-MCNC: 13 MG/DL — SIGNIFICANT CHANGE UP (ref 7–18)
CALCIUM SERPL-MCNC: 8 MG/DL — LOW (ref 8.4–10.5)
CHLORIDE SERPL-SCNC: 105 MMOL/L — SIGNIFICANT CHANGE UP (ref 96–108)
CHOLEST SERPL-MCNC: 211 MG/DL — HIGH (ref 10–199)
CO2 SERPL-SCNC: 27 MMOL/L — SIGNIFICANT CHANGE UP (ref 22–31)
COCAINE METAB.OTHER UR-MCNC: NEGATIVE — SIGNIFICANT CHANGE UP
COLOR SPEC: YELLOW — SIGNIFICANT CHANGE UP
CREAT SERPL-MCNC: 0.64 MG/DL — SIGNIFICANT CHANGE UP (ref 0.5–1.3)
DIFF PNL FLD: NEGATIVE — SIGNIFICANT CHANGE UP
EPI CELLS # UR: ABNORMAL /HPF
GLUCOSE BLDC GLUCOMTR-MCNC: 101 MG/DL — HIGH (ref 70–99)
GLUCOSE BLDC GLUCOMTR-MCNC: 105 MG/DL — HIGH (ref 70–99)
GLUCOSE BLDC GLUCOMTR-MCNC: 112 MG/DL — HIGH (ref 70–99)
GLUCOSE BLDC GLUCOMTR-MCNC: 144 MG/DL — HIGH (ref 70–99)
GLUCOSE BLDC GLUCOMTR-MCNC: 162 MG/DL — HIGH (ref 70–99)
GLUCOSE BLDC GLUCOMTR-MCNC: 243 MG/DL — HIGH (ref 70–99)
GLUCOSE SERPL-MCNC: 102 MG/DL — HIGH (ref 70–99)
GLUCOSE UR QL: NEGATIVE — SIGNIFICANT CHANGE UP
HCT VFR BLD CALC: 38.3 % — LOW (ref 39–50)
HDLC SERPL-MCNC: 56 MG/DL — SIGNIFICANT CHANGE UP
HGB BLD-MCNC: 13.3 G/DL — SIGNIFICANT CHANGE UP (ref 13–17)
KETONES UR-MCNC: ABNORMAL
LACTATE SERPL-SCNC: 1.2 MMOL/L — SIGNIFICANT CHANGE UP (ref 0.7–2)
LEUKOCYTE ESTERASE UR-ACNC: NEGATIVE — SIGNIFICANT CHANGE UP
LIPID PNL WITH DIRECT LDL SERPL: 133 MG/DL — SIGNIFICANT CHANGE UP
MAGNESIUM SERPL-MCNC: 2 MG/DL — SIGNIFICANT CHANGE UP (ref 1.6–2.6)
MCHC RBC-ENTMCNC: 34.6 PG — HIGH (ref 27–34)
MCHC RBC-ENTMCNC: 34.7 GM/DL — SIGNIFICANT CHANGE UP (ref 32–36)
MCV RBC AUTO: 99.7 FL — SIGNIFICANT CHANGE UP (ref 80–100)
METHADONE UR-MCNC: NEGATIVE — SIGNIFICANT CHANGE UP
NITRITE UR-MCNC: NEGATIVE — SIGNIFICANT CHANGE UP
NRBC # BLD: 0 /100 WBCS — SIGNIFICANT CHANGE UP (ref 0–0)
OPIATES UR-MCNC: NEGATIVE — SIGNIFICANT CHANGE UP
PCP SPEC-MCNC: SIGNIFICANT CHANGE UP
PCP UR-MCNC: NEGATIVE — SIGNIFICANT CHANGE UP
PH UR: 7 — SIGNIFICANT CHANGE UP (ref 5–8)
PHOSPHATE SERPL-MCNC: 4.2 MG/DL — SIGNIFICANT CHANGE UP (ref 2.5–4.5)
PLATELET # BLD AUTO: 200 K/UL — SIGNIFICANT CHANGE UP (ref 150–400)
POTASSIUM SERPL-MCNC: 3.7 MMOL/L — SIGNIFICANT CHANGE UP (ref 3.5–5.3)
POTASSIUM SERPL-SCNC: 3.7 MMOL/L — SIGNIFICANT CHANGE UP (ref 3.5–5.3)
PROT SERPL-MCNC: 7.6 G/DL — SIGNIFICANT CHANGE UP (ref 6–8.3)
PROT UR-MCNC: 30 MG/DL
RBC # BLD: 3.84 M/UL — LOW (ref 4.2–5.8)
RBC # FLD: 13 % — SIGNIFICANT CHANGE UP (ref 10.3–14.5)
SODIUM SERPL-SCNC: 142 MMOL/L — SIGNIFICANT CHANGE UP (ref 135–145)
SP GR SPEC: 1.01 — SIGNIFICANT CHANGE UP (ref 1.01–1.02)
THC UR QL: NEGATIVE — SIGNIFICANT CHANGE UP
TOTAL CHOLESTEROL/HDL RATIO MEASUREMENT: 3.8 RATIO — SIGNIFICANT CHANGE UP (ref 3.4–9.6)
TRIGL SERPL-MCNC: 110 MG/DL — SIGNIFICANT CHANGE UP (ref 10–149)
TSH SERPL-MCNC: 3.52 UU/ML — SIGNIFICANT CHANGE UP (ref 0.34–4.82)
UROBILINOGEN FLD QL: 1
VIT B12 SERPL-MCNC: >2000 PG/ML — HIGH (ref 232–1245)
WBC # BLD: 6.15 K/UL — SIGNIFICANT CHANGE UP (ref 3.8–10.5)
WBC # FLD AUTO: 6.15 K/UL — SIGNIFICANT CHANGE UP (ref 3.8–10.5)
WBC UR QL: SIGNIFICANT CHANGE UP /HPF (ref 0–5)

## 2020-03-17 RX ORDER — DEXTROSE 50 % IN WATER 50 %
25 SYRINGE (ML) INTRAVENOUS ONCE
Refills: 0 | Status: DISCONTINUED | OUTPATIENT
Start: 2020-03-17 | End: 2020-03-17

## 2020-03-17 RX ORDER — MULTIVIT-MIN/FERROUS GLUCONATE 9 MG/15 ML
1 LIQUID (ML) ORAL DAILY
Refills: 0 | Status: DISCONTINUED | OUTPATIENT
Start: 2020-03-17 | End: 2020-03-19

## 2020-03-17 RX ORDER — ENOXAPARIN SODIUM 100 MG/ML
40 INJECTION SUBCUTANEOUS DAILY
Refills: 0 | Status: DISCONTINUED | OUTPATIENT
Start: 2020-03-17 | End: 2020-03-19

## 2020-03-17 RX ORDER — ACETAMINOPHEN 500 MG
650 TABLET ORAL EVERY 6 HOURS
Refills: 0 | Status: DISCONTINUED | OUTPATIENT
Start: 2020-03-17 | End: 2020-03-19

## 2020-03-17 RX ORDER — GLUCAGON INJECTION, SOLUTION 0.5 MG/.1ML
1 INJECTION, SOLUTION SUBCUTANEOUS ONCE
Refills: 0 | Status: DISCONTINUED | OUTPATIENT
Start: 2020-03-17 | End: 2020-03-17

## 2020-03-17 RX ORDER — DEXTROSE 50 % IN WATER 50 %
12.5 SYRINGE (ML) INTRAVENOUS ONCE
Refills: 0 | Status: DISCONTINUED | OUTPATIENT
Start: 2020-03-17 | End: 2020-03-17

## 2020-03-17 RX ORDER — THIAMINE MONONITRATE (VIT B1) 100 MG
250 TABLET ORAL THREE TIMES A DAY
Refills: 0 | Status: DISCONTINUED | OUTPATIENT
Start: 2020-03-17 | End: 2020-03-19

## 2020-03-17 RX ORDER — SODIUM CHLORIDE 9 MG/ML
1000 INJECTION INTRAMUSCULAR; INTRAVENOUS; SUBCUTANEOUS
Refills: 0 | Status: DISCONTINUED | OUTPATIENT
Start: 2020-03-17 | End: 2020-03-19

## 2020-03-17 RX ORDER — INSULIN LISPRO 100/ML
VIAL (ML) SUBCUTANEOUS EVERY 6 HOURS
Refills: 0 | Status: DISCONTINUED | OUTPATIENT
Start: 2020-03-17 | End: 2020-03-19

## 2020-03-17 RX ORDER — FOLIC ACID 0.8 MG
1 TABLET ORAL DAILY
Refills: 0 | Status: DISCONTINUED | OUTPATIENT
Start: 2020-03-17 | End: 2020-03-19

## 2020-03-17 RX ORDER — DEXTROSE 50 % IN WATER 50 %
15 SYRINGE (ML) INTRAVENOUS ONCE
Refills: 0 | Status: DISCONTINUED | OUTPATIENT
Start: 2020-03-17 | End: 2020-03-17

## 2020-03-17 RX ORDER — INSULIN GLARGINE 100 [IU]/ML
10 INJECTION, SOLUTION SUBCUTANEOUS AT BEDTIME
Refills: 0 | Status: DISCONTINUED | OUTPATIENT
Start: 2020-03-17 | End: 2020-03-19

## 2020-03-17 RX ORDER — SODIUM CHLORIDE 9 MG/ML
1000 INJECTION, SOLUTION INTRAVENOUS
Refills: 0 | Status: DISCONTINUED | OUTPATIENT
Start: 2020-03-17 | End: 2020-03-17

## 2020-03-17 RX ORDER — PREGABALIN 225 MG/1
1000 CAPSULE ORAL DAILY
Refills: 0 | Status: DISCONTINUED | OUTPATIENT
Start: 2020-03-17 | End: 2020-03-19

## 2020-03-17 RX ORDER — INSULIN GLARGINE 100 [IU]/ML
10 INJECTION, SOLUTION SUBCUTANEOUS AT BEDTIME
Refills: 0 | Status: DISCONTINUED | OUTPATIENT
Start: 2020-03-17 | End: 2020-03-17

## 2020-03-17 RX ADMIN — Medication 2 MILLIGRAM(S): at 06:35

## 2020-03-17 RX ADMIN — SODIUM CHLORIDE 75 MILLILITER(S): 9 INJECTION INTRAMUSCULAR; INTRAVENOUS; SUBCUTANEOUS at 15:06

## 2020-03-17 RX ADMIN — Medication 1: at 17:39

## 2020-03-17 RX ADMIN — PREGABALIN 1000 MICROGRAM(S): 225 CAPSULE ORAL at 01:56

## 2020-03-17 RX ADMIN — Medication 2 MILLIGRAM(S): at 15:06

## 2020-03-17 RX ADMIN — Medication 250 MILLIGRAM(S): at 01:56

## 2020-03-17 RX ADMIN — ENOXAPARIN SODIUM 40 MILLIGRAM(S): 100 INJECTION SUBCUTANEOUS at 11:50

## 2020-03-17 RX ADMIN — Medication 250 MILLIGRAM(S): at 22:39

## 2020-03-17 RX ADMIN — Medication 250 MILLIGRAM(S): at 06:36

## 2020-03-17 RX ADMIN — Medication 2 MILLIGRAM(S): at 22:38

## 2020-03-17 RX ADMIN — SODIUM CHLORIDE 75 MILLILITER(S): 9 INJECTION INTRAMUSCULAR; INTRAVENOUS; SUBCUTANEOUS at 02:02

## 2020-03-17 RX ADMIN — Medication 250 MILLIGRAM(S): at 15:06

## 2020-03-17 RX ADMIN — Medication 2 MILLIGRAM(S): at 17:39

## 2020-03-17 RX ADMIN — INSULIN GLARGINE 10 UNIT(S): 100 INJECTION, SOLUTION SUBCUTANEOUS at 22:49

## 2020-03-17 RX ADMIN — Medication 1 MILLIGRAM(S): at 02:02

## 2020-03-17 RX ADMIN — Medication 1 TABLET(S): at 01:55

## 2020-03-17 RX ADMIN — INSULIN GLARGINE 10 UNIT(S): 100 INJECTION, SOLUTION SUBCUTANEOUS at 02:33

## 2020-03-17 RX ADMIN — Medication 2 MILLIGRAM(S): at 03:16

## 2020-03-17 RX ADMIN — Medication 2 MILLIGRAM(S): at 09:56

## 2020-03-17 NOTE — PROGRESS NOTE ADULT - ASSESSMENT
65 y/o M pt with a PMHx of DM (on metformin 1000 BID/ repaglinide 1mgTID), EtOH dependence, presents to the ED for anxiety and shakiness in hands.  Patient reportedly  drank heavily for the last 3 days, as much as 1.5L not Vodka per day.  Last drink was on Saturday.   patient has been admitted for Alcohol withdrawal syndrome and Alcohol intoxication

## 2020-03-17 NOTE — PROGRESS NOTE ADULT - SUBJECTIVE AND OBJECTIVE BOX
PGY 1 Note discussed with primary attending    Patient is a 64y old  Male who presents with a chief complaint of Alcohol intoxication (16 Mar 2020 23:50)      INTERVAL HPI/OVERNIGHT EVENTS:   Pt admitted for Alcohol intoxication leading to withdrawal , tremors and nausea\  Pt reports mild abdominal pain but otherwise alert oriented    MEDICATIONS  (STANDING):  cyanocobalamin Injectable 1000 MICROGram(s) IntraMuscular daily  dextrose 5%. 1000 milliLiter(s) (50 mL/Hr) IV Continuous <Continuous>  dextrose 50% Injectable 12.5 Gram(s) IV Push once  dextrose 50% Injectable 25 Gram(s) IV Push once  dextrose 50% Injectable 25 Gram(s) IV Push once  enoxaparin Injectable 40 milliGRAM(s) SubCutaneous daily  folic acid 1 milliGRAM(s) Oral daily  insulin glargine Injectable (LANTUS) 10 Unit(s) SubCutaneous at bedtime  insulin lispro (HumaLOG) corrective regimen sliding scale   SubCutaneous every 6 hours  LORazepam   Injectable 2 milliGRAM(s) IV Push every 4 hours  multivitamin/minerals 1 Tablet(s) Oral daily  sodium chloride 0.9%. 1000 milliLiter(s) (75 mL/Hr) IV Continuous <Continuous>  thiamine Injectable 250 milliGRAM(s) IntraMuscular three times a day    MEDICATIONS  (PRN):  acetaminophen   Tablet .. 650 milliGRAM(s) Oral every 6 hours PRN Mild Pain (1 - 3), Moderate Pain (4 - 6)  dextrose 40% Gel 15 Gram(s) Oral once PRN Blood Glucose LESS THAN 70 milliGRAM(s)/deciliter  glucagon  Injectable 1 milliGRAM(s) IntraMuscular once PRN Glucose LESS THAN 70 milligrams/deciliter  LORazepam   Injectable 2 milliGRAM(s) IV Push every 2 hours PRN Symptom-triggered: 2 point increase in CIWA -Ar score and a total score of 7 or LESS      __________________________________________________  REVIEW OF SYSTEMS:    CONSTITUTIONAL: No fever, drowsy but awake   EYES: no acute visual disturbances  NECK: No pain or stiffness  RESPIRATORY: No cough; No shortness of breath  CARDIOVASCULAR: No chest pain, no palpitations  GASTROINTESTINAL: Mild abdominal pain. No nausea or vomiting; No diarrhea   NEUROLOGICAL: No headache or numbness, no tremors  MUSCULOSKELETAL: No joint pain, no muscle pain  GENITOURINARY: no dysuria, no frequency, no hesitancy  PSYCHIATRY: no depression , no anxiety  ALL OTHER  ROS negative        Vital Signs Last 24 Hrs  T(C): 36.4 (17 Mar 2020 07:33), Max: 37 (16 Mar 2020 21:52)  T(F): 97.5 (17 Mar 2020 07:33), Max: 98.6 (16 Mar 2020 21:52)  HR: 94 (17 Mar 2020 07:33) (94 - 115)  BP: 132/75 (17 Mar 2020 07:33) (120/75 - 132/75)  BP(mean): --  RR: 18 (17 Mar 2020 07:33) (17 - 20)  SpO2: 96% (17 Mar 2020 07:33) (95% - 100%)    ________________________________________________  PHYSICAL EXAM:  GENERAL: NAD , drowsy  HEENT: Normocephalic;  conjunctivae and sclerae clear; moist mucous membranes;   NECK : supple  CHEST/LUNG: Clear to auscultation bilaterally with good air entry   HEART: S1 S2  regular; no murmurs, gallops or rubs  ABDOMEN: Soft, Nontender, Nondistended; Bowel sounds present  EXTREMITIES: no cyanosis; no edema; no calf tenderness  SKIN: warm and dry; no rash  NERVOUS SYSTEM:  Awake and alert; Oriented  to place, person and time ; no new deficits    _________________________________________________  LABS:                        13.3   6.15  )-----------( 200      ( 17 Mar 2020 07:41 )             38.3     03-17    142  |  105  |  13  ----------------------------<  102<H>  3.7   |  27  |  0.64    Ca    8.0<L>      17 Mar 2020 07:41  Phos  4.2     03-17  Mg     2.0     03-17    TPro  7.6  /  Alb  3.3<L>  /  TBili  0.9  /  DBili  x   /  AST  47<H>  /  ALT  40  /  AlkPhos  84  03-17        CAPILLARY BLOOD GLUCOSE      POCT Blood Glucose.: 105 mg/dL (17 Mar 2020 06:43)  POCT Blood Glucose.: 101 mg/dL (17 Mar 2020 02:31)  POCT Blood Glucose.: 141 mg/dL (16 Mar 2020 20:19)  POCT Blood Glucose.: 143 mg/dL (16 Mar 2020 20:00)        RADIOLOGY & ADDITIONAL TESTS:    Imaging Personally Reviewed:  YES    Consultant(s) Notes Reviewed:   YES    Care Discussed with Consultants :     Plan of care was discussed with patient and /or primary care giver; all questions and concerns were addressed and care was aligned with patient's wishes.

## 2020-03-17 NOTE — SBIRT NOTE ADULT - NSSBIRTALCTYPDAY_GEN_A_CORE
Pt. provided different answers when asked at different times during SW assessment; "2 beers everyday", "a beer", "sometimes I drink 6 bottles but the small ones"

## 2020-03-17 NOTE — PATIENT PROFILE ADULT - SAFE PLACE TO LIVE - DETAILS
pt currently living at home with son but since he lost his job and cant pay rent his son said he has to move out

## 2020-03-17 NOTE — PROGRESS NOTE ADULT - PROBLEM SELECTOR PLAN 2
Labs significant for high Alcohol level : 221  ECG : first degree heart block, sinus rhythm   Will Manage with Ativan PRN 2mg q2 hrs and Ativan standing dose  Will Manage with Thiamine, Folate, Multivitamins, vitamin B12  Will reduce ativan dosages as possible.  Advance the diet, c/w IVF, Monitor bmp   bmp, lactate, magnesium and phosphorus normal

## 2020-03-17 NOTE — PROGRESS NOTE ADULT - PROBLEM SELECTOR PLAN 1
-patient came with excessive Alcohol intake,   -Heavy Vodka drinker ,    - in January and patient presented with withdrawal. So high suspicion of withdrawal this time  -CIWA on admission 17 but in the morning it is 2  -Mildly nauseous and abdominal pain  -Continue Ativan PRN 2mg Q2, Standing Ativan 2mg Q4 for 2 days, Will taper as tolerated  -Continue IV folic acid, thiamine, B12  -mag and Phos normal ,BMP normal,   -Seizure precautions  - consult

## 2020-03-17 NOTE — PROGRESS NOTE ADULT - PROBLEM SELECTOR PLAN 4
[] Previous VTE                                                3  [] Thrombophilia                                             2  [] Lower limb paralysis                                   2    [] Current Cancer                                             2   [x] Immobilization > 24 hrs                              1  [] ICU/CCU stay > 24 hours                             1  [x] Age > 60                                                         1    IMPROVE VTE Score: 2; Lovenox subq for DVT prophy

## 2020-03-17 NOTE — SBIRT NOTE ADULT - NSSBIRTBRIEFINTDET_GEN_A_CORE
Overall pt. guarded when discussing etoh intake.  Pt. recognized hx etoh dependence and hx attending AA groups (reports he last went 2 months ago).  He is interested in attending AA groups again and accepted resources for AA Meetings in [place] and [place] - pt. plans to move to [place] with his friend next week.

## 2020-03-18 LAB
GLUCOSE BLDC GLUCOMTR-MCNC: 127 MG/DL — HIGH (ref 70–99)
GLUCOSE BLDC GLUCOMTR-MCNC: 164 MG/DL — HIGH (ref 70–99)
GLUCOSE BLDC GLUCOMTR-MCNC: 181 MG/DL — HIGH (ref 70–99)
GLUCOSE BLDC GLUCOMTR-MCNC: 196 MG/DL — HIGH (ref 70–99)
GLUCOSE BLDC GLUCOMTR-MCNC: 204 MG/DL — HIGH (ref 70–99)

## 2020-03-18 RX ADMIN — Medication 2: at 00:05

## 2020-03-18 RX ADMIN — Medication 2 MILLIGRAM(S): at 05:54

## 2020-03-18 RX ADMIN — Medication 1: at 23:11

## 2020-03-18 RX ADMIN — Medication 1: at 12:25

## 2020-03-18 RX ADMIN — Medication 2: at 17:10

## 2020-03-18 RX ADMIN — Medication 250 MILLIGRAM(S): at 14:46

## 2020-03-18 RX ADMIN — ENOXAPARIN SODIUM 40 MILLIGRAM(S): 100 INJECTION SUBCUTANEOUS at 12:25

## 2020-03-18 RX ADMIN — Medication 1 TABLET(S): at 12:25

## 2020-03-18 RX ADMIN — Medication 2 MILLIGRAM(S): at 17:10

## 2020-03-18 RX ADMIN — PREGABALIN 1000 MICROGRAM(S): 225 CAPSULE ORAL at 12:25

## 2020-03-18 RX ADMIN — Medication 2 MILLIGRAM(S): at 01:22

## 2020-03-18 RX ADMIN — INSULIN GLARGINE 10 UNIT(S): 100 INJECTION, SOLUTION SUBCUTANEOUS at 21:50

## 2020-03-18 RX ADMIN — Medication 250 MILLIGRAM(S): at 05:56

## 2020-03-18 RX ADMIN — Medication 1 MILLIGRAM(S): at 14:46

## 2020-03-18 RX ADMIN — Medication 250 MILLIGRAM(S): at 21:49

## 2020-03-18 NOTE — PROGRESS NOTE ADULT - SUBJECTIVE AND OBJECTIVE BOX
PGY 1 Note discussed with primary attending    Patient is a 64y old  Male who presents with a chief complaint of Alcohol intoxication (17 Mar 2020 10:56)      INTERVAL HPI/OVERNIGHT EVENTS:   No acute event overnight reported by overnight team and nurses. Pt remained hemodynamically stable.  Pt seen and examined  at bed side. All concerns and questions answered.   Report no new complaint. Pt denies any fever, nausea, vomiting.      MEDICATIONS  (STANDING):  cyanocobalamin Injectable 1000 MICROGram(s) IntraMuscular daily  enoxaparin Injectable 40 milliGRAM(s) SubCutaneous daily  folic acid 1 milliGRAM(s) Oral daily  insulin glargine Injectable (LANTUS) 10 Unit(s) SubCutaneous at bedtime  insulin lispro (HumaLOG) corrective regimen sliding scale   SubCutaneous every 6 hours  LORazepam   Injectable 2 milliGRAM(s) IV Push every 6 hours  multivitamin/minerals 1 Tablet(s) Oral daily  sodium chloride 0.9%. 1000 milliLiter(s) (75 mL/Hr) IV Continuous <Continuous>  thiamine Injectable 250 milliGRAM(s) IntraMuscular three times a day    MEDICATIONS  (PRN):  acetaminophen   Tablet .. 650 milliGRAM(s) Oral every 6 hours PRN Mild Pain (1 - 3), Moderate Pain (4 - 6)  LORazepam   Injectable 2 milliGRAM(s) IV Push every 2 hours PRN Symptom-triggered: 2 point increase in CIWA -Ar score and a total score of 7 or LESS      __________________________________________________  REVIEW OF SYSTEMS:    CONSTITUTIONAL: No fever,   EYES: no acute visual disturbances  NECK: No pain or stiffness  RESPIRATORY: No cough; No shortness of breath  CARDIOVASCULAR: No chest pain, no palpitations  GASTROINTESTINAL: Mild abdominal pain. No nausea or vomiting; No diarrhea   NEUROLOGICAL: No headache or numbness, no tremors  MUSCULOSKELETAL: No joint pain, no muscle pain  GENITOURINARY: no dysuria, no frequency, no hesitancy  PSYCHIATRY: no depression , no anxiety  ALL OTHER  ROS negative        Vital Signs Last 24 Hrs  T(C): 36.2 (18 Mar 2020 07:59), Max: 36.9 (17 Mar 2020 15:53)  T(F): 97.2 (18 Mar 2020 07:59), Max: 98.4 (17 Mar 2020 15:53)  HR: 79 (18 Mar 2020 07:59) (79 - 91)  BP: 134/77 (18 Mar 2020 07:59) (123/72 - 140/73)  BP(mean): --  RR: 18 (18 Mar 2020 07:59) (18 - 18)  SpO2: 97% (18 Mar 2020 07:59) (95% - 97%)    ________________________________________________  PHYSICAL EXAM:  GENERAL: NAD  HEENT: Normocephalic;  conjunctivae and sclerae clear; moist mucous membranes;   NECK : supple  CHEST/LUNG: Clear to auscultation bilaterally with good air entry   HEART: S1 S2  regular; no murmurs, gallops or rubs  ABDOMEN: Soft, Nontender, Nondistended; Bowel sounds present  EXTREMITIES: no cyanosis; no edema; no calf tenderness  SKIN: warm and dry; no rash  NERVOUS SYSTEM:  Awake and alert; Oriented  to place, person and time ; no new deficits    _________________________________________________  LABS:                        13.3   6.15  )-----------( 200      ( 17 Mar 2020 07:41 )             38.3     03-17    142  |  105  |  13  ----------------------------<  102<H>  3.7   |  27  |  0.64    Ca    8.0<L>      17 Mar 2020 07:41  Phos  4.2     03-17  Mg     2.0     03-17    TPro  7.6  /  Alb  3.3<L>  /  TBili  0.9  /  DBili  x   /  AST  47<H>  /  ALT  40  /  AlkPhos  84  03-17      Urinalysis Basic - ( 17 Mar 2020 11:59 )    Color: Yellow / Appearance: Clear / S.010 / pH: x  Gluc: x / Ketone: Large  / Bili: Negative / Urobili: 1   Blood: x / Protein: 30 mg/dL / Nitrite: Negative   Leuk Esterase: Negative / RBC: x / WBC 0-2 /HPF   Sq Epi: x / Non Sq Epi: Occasional /HPF / Bacteria: x      CAPILLARY BLOOD GLUCOSE      POCT Blood Glucose.: 196 mg/dL (18 Mar 2020 11:56)  POCT Blood Glucose.: 127 mg/dL (18 Mar 2020 06:07)  POCT Blood Glucose.: 243 mg/dL (17 Mar 2020 23:55)  POCT Blood Glucose.: 112 mg/dL (17 Mar 2020 21:59)  POCT Blood Glucose.: 162 mg/dL (17 Mar 2020 16:59)        RADIOLOGY & ADDITIONAL TESTS:    Imaging Personally Reviewed:  YES    Consultant(s) Notes Reviewed:   YES    Care Discussed with Consultants :     Plan of care was discussed with patient and /or primary care giver; all questions and concerns were addressed and care was aligned with patient's wishes.

## 2020-03-18 NOTE — PROGRESS NOTE ADULT - PROBLEM SELECTOR PLAN 2
Labs significant for high Alcohol level : 221  ECG : first degree heart block, sinus rhythm   Advance the diet, c/w IVF, Monitor bmp   bmp, lactate, magnesium and phosphorus normal

## 2020-03-18 NOTE — PROGRESS NOTE ADULT - PROBLEM SELECTOR PLAN 1
-patient came with excessive Alcohol intake,   -Heavy Vodka drinker ,    - in January and patient presented with withdrawal. So high suspicion of withdrawal this time  -CIWA on admission 17 but in the morning it is 2  -Mildly nauseous and abdominal pain  - Not requiring PRN ativan and standing ativan is being tapered down to Q6 to Q8  -Continue IV folic acid, thiamine, B12  -mag and Phos normal ,BMP normal,   -Seizure precautions  - consult

## 2020-03-19 VITALS
RESPIRATION RATE: 16 BRPM | SYSTOLIC BLOOD PRESSURE: 112 MMHG | HEART RATE: 71 BPM | OXYGEN SATURATION: 99 % | DIASTOLIC BLOOD PRESSURE: 73 MMHG | TEMPERATURE: 98 F

## 2020-03-19 LAB
ANION GAP SERPL CALC-SCNC: 6 MMOL/L — SIGNIFICANT CHANGE UP (ref 5–17)
BUN SERPL-MCNC: 12 MG/DL — SIGNIFICANT CHANGE UP (ref 7–18)
CALCIUM SERPL-MCNC: 8.3 MG/DL — LOW (ref 8.4–10.5)
CHLORIDE SERPL-SCNC: 108 MMOL/L — SIGNIFICANT CHANGE UP (ref 96–108)
CO2 SERPL-SCNC: 25 MMOL/L — SIGNIFICANT CHANGE UP (ref 22–31)
CREAT SERPL-MCNC: 0.67 MG/DL — SIGNIFICANT CHANGE UP (ref 0.5–1.3)
GLUCOSE BLDC GLUCOMTR-MCNC: 139 MG/DL — HIGH (ref 70–99)
GLUCOSE SERPL-MCNC: 143 MG/DL — HIGH (ref 70–99)
HCT VFR BLD CALC: 39.3 % — SIGNIFICANT CHANGE UP (ref 39–50)
HGB BLD-MCNC: 13.6 G/DL — SIGNIFICANT CHANGE UP (ref 13–17)
MCHC RBC-ENTMCNC: 34.1 PG — HIGH (ref 27–34)
MCHC RBC-ENTMCNC: 34.6 GM/DL — SIGNIFICANT CHANGE UP (ref 32–36)
MCV RBC AUTO: 98.5 FL — SIGNIFICANT CHANGE UP (ref 80–100)
NRBC # BLD: 0 /100 WBCS — SIGNIFICANT CHANGE UP (ref 0–0)
PLATELET # BLD AUTO: 162 K/UL — SIGNIFICANT CHANGE UP (ref 150–400)
POTASSIUM SERPL-MCNC: 3.8 MMOL/L — SIGNIFICANT CHANGE UP (ref 3.5–5.3)
POTASSIUM SERPL-SCNC: 3.8 MMOL/L — SIGNIFICANT CHANGE UP (ref 3.5–5.3)
RBC # BLD: 3.99 M/UL — LOW (ref 4.2–5.8)
RBC # FLD: 12.2 % — SIGNIFICANT CHANGE UP (ref 10.3–14.5)
SODIUM SERPL-SCNC: 139 MMOL/L — SIGNIFICANT CHANGE UP (ref 135–145)
WBC # BLD: 4.28 K/UL — SIGNIFICANT CHANGE UP (ref 3.8–10.5)
WBC # FLD AUTO: 4.28 K/UL — SIGNIFICANT CHANGE UP (ref 3.8–10.5)

## 2020-03-19 PROCEDURE — 82962 GLUCOSE BLOOD TEST: CPT

## 2020-03-19 PROCEDURE — 80053 COMPREHEN METABOLIC PANEL: CPT

## 2020-03-19 PROCEDURE — 84443 ASSAY THYROID STIM HORMONE: CPT

## 2020-03-19 PROCEDURE — 85027 COMPLETE CBC AUTOMATED: CPT

## 2020-03-19 PROCEDURE — 82607 VITAMIN B-12: CPT

## 2020-03-19 PROCEDURE — 96374 THER/PROPH/DIAG INJ IV PUSH: CPT

## 2020-03-19 PROCEDURE — 96376 TX/PRO/DX INJ SAME DRUG ADON: CPT

## 2020-03-19 PROCEDURE — 93005 ELECTROCARDIOGRAM TRACING: CPT

## 2020-03-19 PROCEDURE — 99053 MED SERV 10PM-8AM 24 HR FAC: CPT

## 2020-03-19 PROCEDURE — 36415 COLL VENOUS BLD VENIPUNCTURE: CPT

## 2020-03-19 PROCEDURE — 81001 URINALYSIS AUTO W/SCOPE: CPT

## 2020-03-19 PROCEDURE — 80307 DRUG TEST PRSMV CHEM ANLYZR: CPT

## 2020-03-19 PROCEDURE — 80061 LIPID PANEL: CPT

## 2020-03-19 PROCEDURE — 83036 HEMOGLOBIN GLYCOSYLATED A1C: CPT

## 2020-03-19 PROCEDURE — 99285 EMERGENCY DEPT VISIT HI MDM: CPT | Mod: 25

## 2020-03-19 PROCEDURE — 83735 ASSAY OF MAGNESIUM: CPT

## 2020-03-19 PROCEDURE — 96372 THER/PROPH/DIAG INJ SC/IM: CPT

## 2020-03-19 PROCEDURE — 84100 ASSAY OF PHOSPHORUS: CPT

## 2020-03-19 PROCEDURE — 83605 ASSAY OF LACTIC ACID: CPT

## 2020-03-19 PROCEDURE — 80048 BASIC METABOLIC PNL TOTAL CA: CPT

## 2020-03-19 RX ADMIN — Medication 250 MILLIGRAM(S): at 06:09

## 2020-03-19 RX ADMIN — Medication 2 MILLIGRAM(S): at 06:10

## 2020-03-19 RX ADMIN — Medication 1 TABLET(S): at 11:01

## 2020-03-19 RX ADMIN — Medication 1 MILLIGRAM(S): at 11:01

## 2020-03-19 NOTE — DISCHARGE NOTE PROVIDER - NSDCCPCAREPLAN_GEN_ALL_CORE_FT
PRINCIPAL DISCHARGE DIAGNOSIS  Diagnosis: Alcohol withdrawal syndrome without complication  Assessment and Plan of Treatment: You came to hospital with severe Alcohol intoxication and your blood Alcohol levels were high. We gave you iV supplements and IV medicatiod to prevent seizures. It is recommended to stop Alcohol use as it poses serious side effects your health.  Please follow up with a rehab program      SECONDARY DISCHARGE DIAGNOSES  Diagnosis: Alcohol use disorder, severe, dependence  Assessment and Plan of Treatment: Alcohol abuse can cause a lot of side effects in your body. It can cause cancer, liver failure, ulcer, pancreatitis, tremors, withdrawal effects, dependence, We strongly recommend to stop using Alcohol abuse can cause a lot of side effects in your body.    Diagnosis: Diabetes  Assessment and Plan of Treatment: .Your sugar was controlled in hospital, we gave you insulin in hospital. Continue with your home medications on discharge and adhere to low carbohydrate diet.

## 2020-03-19 NOTE — DISCHARGE NOTE PROVIDER - NSDCMRMEDTOKEN_GEN_ALL_CORE_FT
folic acid 1 mg oral tablet: 1 tab(s) orally once a day  metFORMIN 1000 mg oral tablet: 1 tab(s) orally 2 times a day  Multiple Vitamins with Minerals oral tablet: 1 tab(s) orally once a day  repaglinide 1 mg oral tablet: 1 tab(s) orally 3 times a day (before meals)

## 2020-03-19 NOTE — DISCHARGE NOTE PROVIDER - HOSPITAL COURSE
65 y/o M pt with a PMHx of DM (on metformin 1000 BID/ repaglinide 1mgTID), EtOH dependence, presented to the ED for anxiety and shakiness in hands.  Patient reportedly drank heavily for the last 3 days, as much as 1.5L Vodka per day.  Last drink was on Saturday. patient had h/o alcohol dependence for 30 years. Last time attended to an alcohol cessation program was more than 20 years ago. Patient denies h/o liver cirrhosis or esophageal varicosis. On admission, pt was AOx3, appears mildly anxious, moderate tremor with arm extend, denies HA/N/V, no auditory, visual or tactile hallucination. denies difficulty swallowing.    Pt was kept on CIWA protocol, Standing and PRN ativan along with IV supplements. CIWA was 1 or 2 during hospital stay.            Given patient's improved clinical status and current hemodynamic stability, decision was made to discharge. Discussed with attending    Please refer to patient's complete medical chart with documents for a full hospital course, for this is only a brief summary. 63 y/o M pt with a PMHx of DM (on metformin 1000 BID/ repaglinide 1mgTID), EtOH dependence, presented to the ED for anxiety and shakiness in hands.  Patient reportedly drank heavily for the last 3 days, as much as 1.5L Vodka per day.  Last drink was on Saturday. patient had h/o alcohol dependence for 30 years. Last time attended to an alcohol cessation program was more than 20 years ago. Patient denies h/o liver cirrhosis or esophageal varicosis. On admission, pt was AOx3, appears mildly anxious, moderate tremor with arm extend, denies HA/N/V, no auditory, visual or tactile hallucination. denies difficulty swallowing.    Pt was kept on CIWA protocol, Standing and PRN ativan along with IV supplements. CIWA was 1 or 2 during hospital stay and never had any further complication.            Given patient's improved clinical status and current hemodynamic stability, decision was made to discharge. Discussed with attending    Please refer to patient's complete medical chart with documents for a full hospital course, for this is only a brief summary.

## 2020-03-19 NOTE — DISCHARGE NOTE NURSING/CASE MANAGEMENT/SOCIAL WORK - PATIENT PORTAL LINK FT
You can access the FollowMyHealth Patient Portal offered by [facility] by registering at the following website: [email or web address]. By joining [employer]’s FollowMyHealth portal, you will also be able to view your health information using other applications (apps) compatible with our system.

## 2020-03-19 NOTE — DISCHARGE NOTE NURSING/CASE MANAGEMENT/SOCIAL WORK - NSDCPEPTCAREGIVEDUMATLIST _GEN_ALL_CORE
Been having feet pain since Saturday and now having trouble standing on them.   
Diabetes/Influenza Vaccination

## 2020-06-28 ENCOUNTER — EMERGENCY (EMERGENCY)
Facility: HOSPITAL | Age: 65
LOS: 1 days | Discharge: HOME | End: 2020-06-28
Attending: EMERGENCY MEDICINE
Payer: MEDICAID

## 2020-06-28 VITALS
HEIGHT: 63 IN | OXYGEN SATURATION: 96 % | SYSTOLIC BLOOD PRESSURE: 122 MMHG | HEART RATE: 98 BPM | DIASTOLIC BLOOD PRESSURE: 78 MMHG | RESPIRATION RATE: 19 BRPM | WEIGHT: 160.06 LBS | TEMPERATURE: 98 F

## 2020-06-28 PROCEDURE — 99285 EMERGENCY DEPT VISIT HI MDM: CPT

## 2020-06-28 PROCEDURE — 82962 GLUCOSE BLOOD TEST: CPT

## 2020-06-28 PROCEDURE — 99283 EMERGENCY DEPT VISIT LOW MDM: CPT

## 2020-06-28 NOTE — ED PROVIDER NOTE - NSFOLLOWUPCLINICS_GEN_ALL_ED_FT
[facility]  [address] [place] [address]  Phone: [phone]  Fax: [phone]  Follow Up Time:

## 2020-06-28 NOTE — ED PROVIDER NOTE - NSFOLLOWUPINSTRUCTIONS_ED_ALL_ED_FT
Stop drinking alcohol.  Follow up in Rehab as discussed/instructed.  Return to the ER for any concerns.    Michelle de beber alcohol.  Sophie un seguimiento en rehabilitación según lo discutido / instruido.  Regrese a la marli de emergencias por cualquier inquietud.

## 2020-06-28 NOTE — ED PROVIDER NOTE - OBJECTIVE STATEMENT
65yo M w DM and alcohol abuse in the ER stating he drank a lot of alcohol today and wants a detox. Poor historian, obvious intoxication. No other info available.

## 2020-06-28 NOTE — ED PROVIDER NOTE - PHYSICAL EXAMINATION
AOB  no signs of trauma Well appearing, in NAD  AOB  Moist mucosae  Pink conjunctivae  No c-spine TTP  Abdomen soft/NT  No CVAT  No abrasions/ecchymosis  Full ROM all extremities, no deformities noted  No pedal edema, no calf TTP  AAOx3, no gross neuro deficits   Ambulatory w mild unsteady gait

## 2020-06-28 NOTE — ED PROVIDER NOTE - PATIENT PORTAL LINK FT
You can access the FollowMyHealth Patient Portal offered by [facility] by registering at the following website: [email or web address]. By joining [facility]’s FollowMyHealth portal, you will also be able to view your health information using other applications (apps) compatible with our system.

## 2020-07-07 NOTE — ED PROVIDER NOTE - DATE/TIME 1
Financial resource strain: Not on file    Food insecurity     Worry: Not on file     Inability: Not on file    Transportation needs     Medical: Not on file     Non-medical: Not on file   Tobacco Use    Smoking status: Never Smoker    Smokeless tobacco: Current User     Types: Chew   Substance and Sexual Activity    Alcohol use: Never     Frequency: Never    Drug use: No    Sexual activity: Never   Lifestyle    Physical activity     Days per week: Not on file     Minutes per session: Not on file    Stress: Not on file   Relationships    Social connections     Talks on phone: Not on file     Gets together: Not on file     Attends Rastafarian service: Not on file     Active member of club or organization: Not on file     Attends meetings of clubs or organizations: Not on file     Relationship status: Not on file    Intimate partner violence     Fear of current or ex partner: Not on file     Emotionally abused: Not on file     Physically abused: Not on file     Forced sexual activity: Not on file   Other Topics Concern    Not on file   Social History Narrative    Not on file     No current facility-administered medications for this encounter. Current Outpatient Medications   Medication Sig Dispense Refill    HYDROcodone-acetaminophen (NORCO) 5-325 MG per tablet Take 1 tablet by mouth every 6 hours as needed for Pain.  ALPRAZolam (XANAX) 0.25 MG tablet Take 0.25 mg by mouth nightly as needed for Sleep or Anxiety.        No Known Allergies    Nursing Notes Reviewed     Physical Exam:   ED Triage Vitals   Enc Vitals Group      BP 07/07/20 0743 138/69      Pulse 07/07/20 0743 93      Resp 07/07/20 0743 18      Temp 07/07/20 0743 98.8 °F (37.1 °C)      Temp Source 07/07/20 0743 Oral      SpO2 07/07/20 0743 98 %      Weight 07/07/20 0729 (!) 440 lb (199.6 kg)      Height 07/07/20 0729 6' 1\" (1.854 m)      Head Circumference --       Peak Flow --       Pain Score --       Pain Loc --       Pain Edu? -- Excl. in GC? --      /69   Pulse 87   Temp 98.8 °F (37.1 °C) (Oral)   Resp 18   Ht 6' 1\" (1.854 m)   Wt (!) 440 lb (199.6 kg)   SpO2 96%   BMI 58.05 kg/m²   My pulse ox interpretation is - normal  Physical Exam  Constitutional:       General: He is not in acute distress. Appearance: Normal appearance. He is not diaphoretic. HENT:      Head: Normocephalic and atraumatic. Eyes:      General:         Right eye: No discharge. Left eye: No discharge. Conjunctiva/sclera: Conjunctivae normal.   Cardiovascular:      Rate and Rhythm: Normal rate and regular rhythm. Pulses: Normal pulses. Radial pulses are 2+ on the right side and 2+ on the left side. Pulmonary:      Effort: Pulmonary effort is normal. No accessory muscle usage or respiratory distress. Breath sounds: Examination of the right-lower field reveals decreased breath sounds. Examination of the left-lower field reveals decreased breath sounds. Decreased breath sounds (possibly 2/2 habitus ) present. No wheezing or rales. Abdominal:      General: There is no distension. Tenderness: There is no abdominal tenderness. Musculoskeletal: Normal range of motion. General: Swelling present. No tenderness. Right lower leg: Edema (3+) present. Left lower leg: Edema (3+) present. Skin:     General: Skin is warm and dry. Neurological:      General: No focal deficit present. Mental Status: He is alert. Cranial Nerves: No cranial nerve deficit.    Psychiatric:         Mood and Affect: Mood normal.         Behavior: Behavior normal.         I have reviewed and interpreted all of the currently available lab results from this visit (if applicable):  Results for orders placed or performed during the hospital encounter of 07/07/20   CBC Auto Differential   Result Value Ref Range    WBC 9.5 4.0 - 10.5 K/CU MM    RBC 4.43 (L) 4.6 - 6.2 M/CU MM    Hemoglobin 9.8 (L) 13.5 - 18.0 GM/DL myself in the absence of a radiologist:  [x]Radiologist's Report Reviewed:  XR CHEST PORTABLE   Final Result   1. Low lung volumes but no active pulmonary disease. EKG (if obtained): (All EKG's are interpreted by myself in the absence of a cardiologist)  Normal sinus rhythm with a rate of 73. WI interval 174, QRS 78, QTc 440. No ST elevations or depressions. Normal T waves. Q waves in lead III. Impression: Abnormal EKG. No previous EKGs for comparison. MDM:  Differential diagnoses considered include but are not limited to CHF exacerbation, pulmonary edema, pneumonia, pneumothorax, COVID-19, shortness of breath due to deconditioning, pulmonary embolism. Patient is well-appearing and nontoxic-appearing with normal vital signs. Basic labs were obtained and are unremarkable. BMP is within normal limits. Troponin is normal.  EKG is not concerning for acute ischemia. Chest x-ray shows no acute cardiopulmonary abnormalities. Patient is maintaining normal oxygen saturation throughout his stay in the emergency department. He was ambulated in the emergency department on pulse ox and maintain oxygen saturation of 99% throughout. As his parents did have [employer] earlier this month I did send a COVID test, but he is currently nontoxic and well-appearing and believe he is stable for discharge home. I recommended that he self isolate until results of the cover test are known. I recommend he follow close with his primary care physician. We will discharge him home in stable condition. Plan of care explained to patient. Concerning signs and symptoms warranting a return visit to the Emergency Department were explained in detail. All questions and concerns were addressed to the patient's satisfaction. Patient understood and agreed with plan.     I did don appropriate PPE (including N95 face mask, protective eye ware/safety glasses, gloves, hair covering, and no isolation gown), as recommended by the health facility/national standard best practice, during my bedside interactions with the patient. The likelihood of other entities in the differential is insufficient to justify any further testing for them. This was explained to the patient. The patient was advised that persistent or worsening symptoms would requirefurther evaluation. Clinical Impression:  1. Dyspnea, unspecified type          Blanquita Garcia MD       Please note that portions of this note may have been complete with a voice recognition program.  Effortswere made to edit the dictations, but occasional words are mis-transcribed.           Blanquita Garcia MD  07/07/20 [id number] 16-Jan-2020 11:50

## 2020-10-15 NOTE — ED PROVIDER NOTE - CLINICAL SUMMARY MEDICAL DECISION MAKING FREE TEXT BOX
Attempted to schedule PCP appt with Dr. Xochitl Riddle and saw that the patient already has an appt scheduled for Oct 20 @ 9:20AM 
 65 yo M intoxicated and with chest pain. Asymptomatic currently. Labs, EKG, CXR unremarkable. Patient without signs of trauma. Ambulatory without difficulty. Vital signs stable. Nontoxic and medically stable for discharged. Return precautions provided and patient understands to return to the ED for worsening signs and symptoms. Instructed to follow up with primary care physician and agreeable. Patient's questions answered. 63 yo M intoxicated and with chest pain. Asymptomatic currently. Labs, EKG, CXR unremarkable. Patient without signs of trauma. Ambulatory without difficulty. Heart score 3. Vital signs stable. Nontoxic and medically stable for discharged. Return precautions provided and patient understands to return to the ED for worsening signs and symptoms. Instructed to follow up with primary care physician and agreeable. Patient's questions answered.

## 2021-01-22 NOTE — ED ADULT NURSE NOTE - NS ED NURSE RECORD ANOTHER HT AND WT
Yes Female Pregnancy Counseling Text: Female patients should also be on two forms of birth control while taking this medication and for one month after their last dose.

## 2021-06-01 NOTE — ED PROVIDER NOTE - PRINCIPAL DIAGNOSIS
Spine appears normal, range of motion is not limited, no muscle or joint tenderness Alcoholic intoxication without complication

## 2021-09-09 NOTE — ED ADULT NURSE NOTE - INTERVENTIONS DEFINITIONS
N/A
Stretcher in lowest position, wheels locked, appropriate side rails in place/Instruct patient to call for assistance/Provide visual clues: red socks/Provide visual cue, wrist band, yellow gown, etc.

## 2021-11-05 ENCOUNTER — INPATIENT (INPATIENT)
Facility: HOSPITAL | Age: 66
LOS: 3 days | Discharge: HOME | DRG: 897 | End: 2021-11-09
Attending: STUDENT IN AN ORGANIZED HEALTH CARE EDUCATION/TRAINING PROGRAM | Admitting: STUDENT IN AN ORGANIZED HEALTH CARE EDUCATION/TRAINING PROGRAM
Payer: MEDICAID

## 2021-11-05 VITALS
HEIGHT: 63 IN | WEIGHT: 160.06 LBS | TEMPERATURE: 99 F | HEART RATE: 117 BPM | OXYGEN SATURATION: 98 % | RESPIRATION RATE: 18 BRPM | DIASTOLIC BLOOD PRESSURE: 82 MMHG | SYSTOLIC BLOOD PRESSURE: 144 MMHG

## 2021-11-05 DIAGNOSIS — Z29.9 ENCOUNTER FOR PROPHYLACTIC MEASURES, UNSPECIFIED: ICD-10-CM

## 2021-11-05 DIAGNOSIS — E87.2 ACIDOSIS: ICD-10-CM

## 2021-11-05 DIAGNOSIS — R11.2 NAUSEA WITH VOMITING, UNSPECIFIED: ICD-10-CM

## 2021-11-05 DIAGNOSIS — N17.9 ACUTE KIDNEY FAILURE, UNSPECIFIED: ICD-10-CM

## 2021-11-05 DIAGNOSIS — F10.139 ALCOHOL ABUSE WITH WITHDRAWAL, UNSPECIFIED: ICD-10-CM

## 2021-11-05 DIAGNOSIS — I10 ESSENTIAL (PRIMARY) HYPERTENSION: ICD-10-CM

## 2021-11-05 DIAGNOSIS — E11.9 TYPE 2 DIABETES MELLITUS WITHOUT COMPLICATIONS: ICD-10-CM

## 2021-11-05 LAB
ACETONE SERPL-MCNC: ABNORMAL
ALBUMIN SERPL ELPH-MCNC: 3.3 G/DL — LOW (ref 3.5–5)
ALBUMIN SERPL ELPH-MCNC: 3.7 G/DL — SIGNIFICANT CHANGE UP (ref 3.5–5)
ALP SERPL-CCNC: 106 U/L — SIGNIFICANT CHANGE UP (ref 40–120)
ALP SERPL-CCNC: 128 U/L — HIGH (ref 40–120)
ALT FLD-CCNC: 53 U/L DA — SIGNIFICANT CHANGE UP (ref 10–60)
ALT FLD-CCNC: 61 U/L DA — HIGH (ref 10–60)
ANION GAP SERPL CALC-SCNC: 18 MMOL/L — HIGH (ref 5–17)
ANION GAP SERPL CALC-SCNC: 30 MMOL/L — HIGH (ref 5–17)
APPEARANCE UR: CLEAR — SIGNIFICANT CHANGE UP
AST SERPL-CCNC: 58 U/L — HIGH (ref 10–40)
AST SERPL-CCNC: 71 U/L — HIGH (ref 10–40)
BACTERIA # UR AUTO: ABNORMAL /HPF
BASE EXCESS BLDV CALC-SCNC: -2.3 MMOL/L — SIGNIFICANT CHANGE UP
BASOPHILS # BLD AUTO: 0.02 K/UL — SIGNIFICANT CHANGE UP (ref 0–0.2)
BASOPHILS NFR BLD AUTO: 0.2 % — SIGNIFICANT CHANGE UP (ref 0–2)
BILIRUB SERPL-MCNC: 1.1 MG/DL — SIGNIFICANT CHANGE UP (ref 0.2–1.2)
BILIRUB SERPL-MCNC: 1.1 MG/DL — SIGNIFICANT CHANGE UP (ref 0.2–1.2)
BILIRUB UR-MCNC: NEGATIVE — SIGNIFICANT CHANGE UP
BUN SERPL-MCNC: 42 MG/DL — HIGH (ref 7–18)
BUN SERPL-MCNC: 47 MG/DL — HIGH (ref 7–18)
CALCIUM SERPL-MCNC: 7.3 MG/DL — LOW (ref 8.4–10.5)
CALCIUM SERPL-MCNC: 8.4 MG/DL — SIGNIFICANT CHANGE UP (ref 8.4–10.5)
CHLORIDE SERPL-SCNC: 100 MMOL/L — SIGNIFICANT CHANGE UP (ref 96–108)
CHLORIDE SERPL-SCNC: 92 MMOL/L — LOW (ref 96–108)
CO2 SERPL-SCNC: 15 MMOL/L — LOW (ref 22–31)
CO2 SERPL-SCNC: 20 MMOL/L — LOW (ref 22–31)
COLOR SPEC: YELLOW — SIGNIFICANT CHANGE UP
CREAT SERPL-MCNC: 1.84 MG/DL — HIGH (ref 0.5–1.3)
CREAT SERPL-MCNC: 2.8 MG/DL — HIGH (ref 0.5–1.3)
DIFF PNL FLD: NEGATIVE — SIGNIFICANT CHANGE UP
EOSINOPHIL # BLD AUTO: 0.19 K/UL — SIGNIFICANT CHANGE UP (ref 0–0.5)
EOSINOPHIL NFR BLD AUTO: 1.5 % — SIGNIFICANT CHANGE UP (ref 0–6)
ETHANOL SERPL-MCNC: <3 MG/DL — SIGNIFICANT CHANGE UP (ref 0–10)
GLUCOSE BLDC GLUCOMTR-MCNC: 178 MG/DL — HIGH (ref 70–99)
GLUCOSE BLDC GLUCOMTR-MCNC: 190 MG/DL — HIGH (ref 70–99)
GLUCOSE BLDC GLUCOMTR-MCNC: 231 MG/DL — HIGH (ref 70–99)
GLUCOSE SERPL-MCNC: 200 MG/DL — HIGH (ref 70–99)
GLUCOSE SERPL-MCNC: 208 MG/DL — HIGH (ref 70–99)
GLUCOSE UR QL: 250
HCO3 BLDV-SCNC: 21 MMOL/L — LOW (ref 22–29)
HCT VFR BLD CALC: 40.5 % — SIGNIFICANT CHANGE UP (ref 39–50)
HGB BLD-MCNC: 13.9 G/DL — SIGNIFICANT CHANGE UP (ref 13–17)
HOROWITZ INDEX BLDV+IHG-RTO: 21 — SIGNIFICANT CHANGE UP
IMM GRANULOCYTES NFR BLD AUTO: 0.3 % — SIGNIFICANT CHANGE UP (ref 0–1.5)
KETONES UR-MCNC: ABNORMAL
LACTATE SERPL-SCNC: 2 MMOL/L — SIGNIFICANT CHANGE UP (ref 0.7–2)
LACTATE SERPL-SCNC: 6.4 MMOL/L — CRITICAL HIGH (ref 0.7–2)
LEUKOCYTE ESTERASE UR-ACNC: NEGATIVE — SIGNIFICANT CHANGE UP
LIDOCAIN IGE QN: 120 U/L — SIGNIFICANT CHANGE UP (ref 73–393)
LYMPHOCYTES # BLD AUTO: 1.47 K/UL — SIGNIFICANT CHANGE UP (ref 1–3.3)
LYMPHOCYTES # BLD AUTO: 11.3 % — LOW (ref 13–44)
MCHC RBC-ENTMCNC: 32 PG — SIGNIFICANT CHANGE UP (ref 27–34)
MCHC RBC-ENTMCNC: 34.3 GM/DL — SIGNIFICANT CHANGE UP (ref 32–36)
MCV RBC AUTO: 93.3 FL — SIGNIFICANT CHANGE UP (ref 80–100)
MONOCYTES # BLD AUTO: 1.07 K/UL — HIGH (ref 0–0.9)
MONOCYTES NFR BLD AUTO: 8.2 % — SIGNIFICANT CHANGE UP (ref 2–14)
NEUTROPHILS # BLD AUTO: 10.26 K/UL — HIGH (ref 1.8–7.4)
NEUTROPHILS NFR BLD AUTO: 78.5 % — HIGH (ref 43–77)
NITRITE UR-MCNC: NEGATIVE — SIGNIFICANT CHANGE UP
NRBC # BLD: 0 /100 WBCS — SIGNIFICANT CHANGE UP (ref 0–0)
OSMOLALITY SERPL: 316 MOSMOL/KG — HIGH (ref 280–301)
PCO2 BLDV: 32 MMHG — LOW (ref 42–55)
PH BLDV: 7.43 — SIGNIFICANT CHANGE UP (ref 7.32–7.43)
PH UR: 6.5 — SIGNIFICANT CHANGE UP (ref 5–8)
PLATELET # BLD AUTO: 194 K/UL — SIGNIFICANT CHANGE UP (ref 150–400)
PO2 BLDV: 101 MMHG — SIGNIFICANT CHANGE UP
POTASSIUM SERPL-MCNC: 4.4 MMOL/L — SIGNIFICANT CHANGE UP (ref 3.5–5.3)
POTASSIUM SERPL-MCNC: 4.7 MMOL/L — SIGNIFICANT CHANGE UP (ref 3.5–5.3)
POTASSIUM SERPL-SCNC: 4.4 MMOL/L — SIGNIFICANT CHANGE UP (ref 3.5–5.3)
POTASSIUM SERPL-SCNC: 4.7 MMOL/L — SIGNIFICANT CHANGE UP (ref 3.5–5.3)
PROT SERPL-MCNC: 8.2 G/DL — SIGNIFICANT CHANGE UP (ref 6–8.3)
PROT SERPL-MCNC: 9.6 G/DL — HIGH (ref 6–8.3)
PROT UR-MCNC: 15
RBC # BLD: 4.34 M/UL — SIGNIFICANT CHANGE UP (ref 4.2–5.8)
RBC # FLD: 14 % — SIGNIFICANT CHANGE UP (ref 10.3–14.5)
RBC CASTS # UR COMP ASSIST: SIGNIFICANT CHANGE UP /HPF (ref 0–2)
SAO2 % BLDV: 99.3 % — SIGNIFICANT CHANGE UP
SARS-COV-2 RNA SPEC QL NAA+PROBE: SIGNIFICANT CHANGE UP
SODIUM SERPL-SCNC: 137 MMOL/L — SIGNIFICANT CHANGE UP (ref 135–145)
SODIUM SERPL-SCNC: 138 MMOL/L — SIGNIFICANT CHANGE UP (ref 135–145)
SP GR SPEC: 1.01 — SIGNIFICANT CHANGE UP (ref 1.01–1.02)
TROPONIN I, HIGH SENSITIVITY RESULT: 13.5 NG/L — SIGNIFICANT CHANGE UP
UROBILINOGEN FLD QL: NEGATIVE — SIGNIFICANT CHANGE UP
WBC # BLD: 13.05 K/UL — HIGH (ref 3.8–10.5)
WBC # FLD AUTO: 13.05 K/UL — HIGH (ref 3.8–10.5)
WBC UR QL: SIGNIFICANT CHANGE UP /HPF (ref 0–5)

## 2021-11-05 PROCEDURE — 74176 CT ABD & PELVIS W/O CONTRAST: CPT | Mod: 26,MA

## 2021-11-05 PROCEDURE — 93010 ELECTROCARDIOGRAM REPORT: CPT

## 2021-11-05 PROCEDURE — 71045 X-RAY EXAM CHEST 1 VIEW: CPT | Mod: 26

## 2021-11-05 PROCEDURE — 99284 EMERGENCY DEPT VISIT MOD MDM: CPT

## 2021-11-05 PROCEDURE — 99223 1ST HOSP IP/OBS HIGH 75: CPT | Mod: GC

## 2021-11-05 RX ORDER — METOCLOPRAMIDE HCL 10 MG
10 TABLET ORAL ONCE
Refills: 0 | Status: COMPLETED | OUTPATIENT
Start: 2021-11-05 | End: 2021-11-05

## 2021-11-05 RX ORDER — SODIUM CHLORIDE 9 MG/ML
1000 INJECTION INTRAMUSCULAR; INTRAVENOUS; SUBCUTANEOUS ONCE
Refills: 0 | Status: COMPLETED | OUTPATIENT
Start: 2021-11-05 | End: 2021-11-05

## 2021-11-05 RX ORDER — ONDANSETRON 8 MG/1
4 TABLET, FILM COATED ORAL ONCE
Refills: 0 | Status: COMPLETED | OUTPATIENT
Start: 2021-11-05 | End: 2021-11-05

## 2021-11-05 RX ORDER — INSULIN LISPRO 100/ML
VIAL (ML) SUBCUTANEOUS
Refills: 0 | Status: DISCONTINUED | OUTPATIENT
Start: 2021-11-05 | End: 2021-11-09

## 2021-11-05 RX ORDER — SODIUM CHLORIDE 9 MG/ML
1000 INJECTION INTRAMUSCULAR; INTRAVENOUS; SUBCUTANEOUS
Refills: 0 | Status: DISCONTINUED | OUTPATIENT
Start: 2021-11-05 | End: 2021-11-06

## 2021-11-05 RX ORDER — FOLIC ACID 0.8 MG
1 TABLET ORAL DAILY
Refills: 0 | Status: DISCONTINUED | OUTPATIENT
Start: 2021-11-05 | End: 2021-11-09

## 2021-11-05 RX ORDER — HEPARIN SODIUM 5000 [USP'U]/ML
5000 INJECTION INTRAVENOUS; SUBCUTANEOUS EVERY 8 HOURS
Refills: 0 | Status: DISCONTINUED | OUTPATIENT
Start: 2021-11-05 | End: 2021-11-09

## 2021-11-05 RX ORDER — PANTOPRAZOLE SODIUM 20 MG/1
40 TABLET, DELAYED RELEASE ORAL ONCE
Refills: 0 | Status: COMPLETED | OUTPATIENT
Start: 2021-11-05 | End: 2021-11-05

## 2021-11-05 RX ORDER — THIAMINE MONONITRATE (VIT B1) 100 MG
100 TABLET ORAL DAILY
Refills: 0 | Status: COMPLETED | OUTPATIENT
Start: 2021-11-05 | End: 2021-11-07

## 2021-11-05 RX ORDER — ONDANSETRON 8 MG/1
4 TABLET, FILM COATED ORAL ONCE
Refills: 0 | Status: DISCONTINUED | OUTPATIENT
Start: 2021-11-05 | End: 2021-11-09

## 2021-11-05 RX ORDER — INSULIN LISPRO 100/ML
VIAL (ML) SUBCUTANEOUS AT BEDTIME
Refills: 0 | Status: DISCONTINUED | OUTPATIENT
Start: 2021-11-05 | End: 2021-11-09

## 2021-11-05 RX ORDER — SODIUM CHLORIDE 9 MG/ML
1000 INJECTION INTRAMUSCULAR; INTRAVENOUS; SUBCUTANEOUS
Refills: 0 | Status: DISCONTINUED | OUTPATIENT
Start: 2021-11-05 | End: 2021-11-05

## 2021-11-05 RX ORDER — MORPHINE SULFATE 50 MG/1
4 CAPSULE, EXTENDED RELEASE ORAL ONCE
Refills: 0 | Status: DISCONTINUED | OUTPATIENT
Start: 2021-11-05 | End: 2021-11-05

## 2021-11-05 RX ADMIN — Medication 2: at 13:22

## 2021-11-05 RX ADMIN — Medication 10 MILLIGRAM(S): at 03:43

## 2021-11-05 RX ADMIN — Medication 2 MILLIGRAM(S): at 21:12

## 2021-11-05 RX ADMIN — SODIUM CHLORIDE 150 MILLILITER(S): 9 INJECTION INTRAMUSCULAR; INTRAVENOUS; SUBCUTANEOUS at 21:13

## 2021-11-05 RX ADMIN — MORPHINE SULFATE 4 MILLIGRAM(S): 50 CAPSULE, EXTENDED RELEASE ORAL at 05:16

## 2021-11-05 RX ADMIN — Medication 2 MILLIGRAM(S): at 18:57

## 2021-11-05 RX ADMIN — ONDANSETRON 4 MILLIGRAM(S): 8 TABLET, FILM COATED ORAL at 01:56

## 2021-11-05 RX ADMIN — MORPHINE SULFATE 4 MILLIGRAM(S): 50 CAPSULE, EXTENDED RELEASE ORAL at 03:43

## 2021-11-05 RX ADMIN — SODIUM CHLORIDE 1000 MILLILITER(S): 9 INJECTION INTRAMUSCULAR; INTRAVENOUS; SUBCUTANEOUS at 03:48

## 2021-11-05 RX ADMIN — Medication 2 MILLIGRAM(S): at 10:26

## 2021-11-05 RX ADMIN — SODIUM CHLORIDE 150 MILLILITER(S): 9 INJECTION INTRAMUSCULAR; INTRAVENOUS; SUBCUTANEOUS at 08:48

## 2021-11-05 RX ADMIN — SODIUM CHLORIDE 1000 MILLILITER(S): 9 INJECTION INTRAMUSCULAR; INTRAVENOUS; SUBCUTANEOUS at 01:47

## 2021-11-05 RX ADMIN — Medication 2 MILLIGRAM(S): at 14:56

## 2021-11-05 RX ADMIN — SODIUM CHLORIDE 150 MILLILITER(S): 9 INJECTION INTRAMUSCULAR; INTRAVENOUS; SUBCUTANEOUS at 18:32

## 2021-11-05 RX ADMIN — PANTOPRAZOLE SODIUM 40 MILLIGRAM(S): 20 TABLET, DELAYED RELEASE ORAL at 03:43

## 2021-11-05 RX ADMIN — HEPARIN SODIUM 5000 UNIT(S): 5000 INJECTION INTRAVENOUS; SUBCUTANEOUS at 21:12

## 2021-11-05 NOTE — ED ADULT NURSE NOTE - HOW OFTEN DO YOU HAVE A DRINK CONTAINING ALCOHOL?
Subjective:     Interval History: No acute events overnight.  Condition unchanged.    Continuous Infusions:   dexmedetomidine (PRECEDEX) infusion 1 mcg/kg/hr (05/01/19 1500)    norepinephrine bitartrate-D5W Stopped (04/19/19 0800)     Scheduled Meds:   albuterol-ipratropium  3 mL Nebulization Q6H WAKE    alteplase  2 mg Intra-Catheter Once    calcium-vitamin D3  1 tablet Per OG tube BID    enoxaparin  40 mg Subcutaneous Daily    fentaNYL  25 mcg Intravenous Q4H    fluticasone propionate  1 spray Each Nare Daily    lipase-protease-amylase  2 capsule Oral Q3H    magnesium oxide  400 mg Per OG tube BID    micafungin (MYCAMINE) IVPB  100 mg Intravenous Q24H    multivit-min-FA-coenzyme Q10 100-5 mcg-mg  1 tablet Per OG tube BID    pantoprozole (PROTONIX) IV  40 mg Intravenous Daily    polyethylene glycol  17 g Per G Tube BID    predniSONE  10 mg Per OG tube Daily    sulfamethoxazole-trimethoprim 800-160mg  1 tablet Per OG tube Every Mon, Wed, Fri    tacrolimus  3 mg Per G Tube BID    ursodiol  300 mg Per OG tube TID     PRN Meds:acetaminophen, fentaNYL, fentaNYL, guaiFENesin, HYDROcodone-acetaminophen, levalbuterol, lorazepam, magnesium sulfate IVPB **AND** magnesium sulfate IVPB, ondansetron, polyethylene glycol, potassium chloride 10% **AND** potassium chloride 10% **AND** potassium chloride 10%, traMADol, traZODone    Review of patient's allergies indicates:   Allergen Reactions    Tylox [oxycodone-acetaminophen] Rash    Voriconazole Other (See Comments)     Increased LFTs       Review of Systems   Unable to perform ROS: Intubated     Objective:   Physical Exam   Constitutional: He is oriented to person, place, and time. He is cooperative. He is intubated.   Thin appearing   HENT:   Head: Normocephalic and atraumatic.   ETT and OG tube in place   Eyes: Conjunctivae and EOM are normal.   Neck: Normal range of motion.   Cardiovascular: Normal rate, regular rhythm and normal heart sounds.    Pulmonary/Chest: He is intubated. He has no wheezes.   Abdominal: Soft. Bowel sounds are normal. He exhibits no distension. There is no tenderness.   Musculoskeletal: Normal range of motion. He exhibits no edema.   Neurological: He is alert and oriented to person, place, and time.   Skin: Skin is warm and dry.   Psychiatric: He has a normal mood and affect. His behavior is normal.         Vital Signs (Most Recent):  Temp: 97.5 °F (36.4 °C) (05/01/19 1500)  Pulse: 96 (05/01/19 1525)  Resp: (!) 23 (05/01/19 1515)  BP: 102/61 (05/01/19 1500)  SpO2: 100 % (05/01/19 1525) Vital Signs (24h Range):  Temp:  [97.5 °F (36.4 °C)-98 °F (36.7 °C)] 97.5 °F (36.4 °C)  Pulse:  [] 96  Resp:  [14-35] 23  SpO2:  [96 %-100 %] 100 %  BP: ()/(45-73) 102/61     Weight: 45.8 kg (100 lb 15.5 oz)  Body mass index is 15.81 kg/m².      Intake/Output Summary (Last 24 hours) at 5/1/2019 1557  Last data filed at 5/1/2019 1500  Gross per 24 hour   Intake 891.84 ml   Output 775 ml   Net 116.84 ml       Ventilator Data:     Vent Mode: A/C  Oxygen Concentration (%):  [50] 50  Resp Rate Total:  [22 br/min-31 br/min] 23 br/min  Vt Set:  [0 mL] 0 mL  PEEP/CPAP:  [5 cmH20] 5 cmH20  Pressure Support:  [0 cmH20] 0 cmH20  Mean Airway Pressure:  [14 cmH20-15 cmH20] 15 cmH20    Hemodynamic Parameters:       Lines/Drains:       Percutaneous Central Line Insertion/Assessment - triple lumen  04/06/19 1651 right femoral vein (Active)   Dressing biopatch in place;dressing dry and intact 4/30/2019  3:00 PM   Securement secured w/ sutures 4/30/2019  3:00 PM   Additional Site Signs no erythema;no warmth;no edema;no pain;no palpable cord;no streak formation;no drainage 4/30/2019  3:00 PM   Distal Patency/Care infusing 4/30/2019  3:00 PM   Medial Patency/Care infusing 4/30/2019  3:00 PM   Proximal Patency/Care normal saline locked 4/30/2019  3:00 PM   Waveform other (see comments) 4/30/2019  3:03 AM   Line Interventions line leveled/zeroed 4/30/2019  3:00  PM   Dressing Change Due 05/05/19 4/30/2019  3:03 AM   Daily Line Review Performed 4/30/2019  3:00 PM   Number of days: 23            NG/OG Tube 04/15/19 0835 orogastric 16 Fr. (Active)   Placement Check placement verified by distal tube length measurement;placement verified by aspirate characteristics;placement verified by aspirate pH 4/30/2019  3:00 PM   Advancement advanced manually 4/28/2019  3:00 PM   Distal Tube Length (cm) 55 4/23/2019  7:00 PM   Tolerance no signs/symptoms of discomfort 4/30/2019  3:00 PM   Securement secured to commercial device 4/30/2019  3:00 PM   Clamp Status/Tolerance clamped 4/30/2019  3:00 PM   Suction Setting/Drainage Method suction at;low;intermittent setting 4/30/2019 11:00 AM   Insertion Site Appearance no redness, warmth, tenderness, skin breakdown, drainage 4/30/2019  3:00 PM   Drainage Brown 4/30/2019 11:00 AM   Flush/Irrigation flushed w/;water;no resistance met 4/30/2019  3:00 PM   Feeding Method continuous 4/29/2019  3:00 PM   Feeding Action feeding held 4/30/2019  3:03 AM   Current Rate (mL/hr) 0 mL/hr 4/29/2019  7:03 PM   Goal Rate (mL/hr) 35 mL/hr 4/29/2019  7:00 AM   Intake (mL) 60 mL 4/28/2019 11:00 PM   Water Bolus (mL) 100 mL 4/26/2019  6:00 PM   Tube Output(mL)(Include Discarded Residual) 100 mL 4/30/2019  6:00 AM   Formula Name Novasource Renal 4/19/2019  3:00 PM   Intake (mL) - Formula Tube Feeding 0 4/29/2019 12:00 PM   Residual Amount (ml) 75 ml 4/29/2019 11:00 AM   Number of days: 15       Significant Labs:  CBC:  Recent Labs   Lab 05/01/19  0430   WBC 6.40   RBC 3.66*   HGB 9.3*   HCT 30.2*      MCV 83   MCH 25.4*   MCHC 30.8*     BMP:  Recent Labs   Lab 05/01/19 0430   *   K 5.0   CL 93*   CO2 30*   BUN 37*   CREATININE 0.8   CALCIUM 9.3      Tacrolimus Levels:  Recent Labs   Lab 04/30/19  0415   TACROLIMUS 6.1     Microbiology:  Microbiology Results (last 7 days)     Procedure Component Value Units Date/Time    Culture, Respiratory with Gram  Stain [[id number]] Collected:  04/15/19 0812    Order Status:  Completed Specimen:  Respiratory from Bronchial Wash, RLL Updated:  04/25/19 1027     Respiratory Culture No S aureus or Pseudomonas isolated.     Respiratory Culture --     CAROL GLABRATA  Few  Normal respiratory fernando also present       Comment: Previous comment was modified by BELLA at 09:22 on 04/23/2019  Normal respiratory fernando also present          Gram Stain (Respiratory) <10 epithelial cells per low power field.     Gram Stain (Respiratory) Moderate WBC's     Gram Stain (Respiratory) Rare budding yeast    Narrative:       RLL BAL for cultures    Fungus Culture, Blood or Bone Marrow [[id number]] Collected:  04/05/19 1455    Order Status:  Completed Specimen:  Blood Updated:  04/25/19 0915     Fungus Cult, blood or BM Culture in progress     Fungus Cult, blood or BM No fungus isolated after 2 weeks          I have reviewed all pertinent labs within the past 24 hours.       Never

## 2021-11-05 NOTE — H&P ADULT - NSHPPHYSICALEXAM_GEN_ALL_CORE
GENERAL: NAD, well-groomed, well-developed  HEAD:  Atraumatic, Normocephalic  EYES: EOMI, PERRLA, conjunctiva and sclera clear  ENMT: No tonsillar erythema, exudates, or enlargement; Moist mucous membranes, Good dentition, No lesions  NECK: Supple, normal appearance, No JVD; Normal thyroid; Trachea midline  NERVOUS SYSTEM:  Alert & Oriented X3,  Motor Strength 5/5 B/L upper and lower extremities, sensation intact  CHEST/LUNG: Lungs clear to auscultation bilaterally, No rales, rhonchi, wheezing   HEART: Regular rate and rhythm; No murmurs, rubs, or gallops  ABDOMEN: Soft, Nontender, Nondistended; Bowel sounds present  EXTREMITIES:  2+ Peripheral Pulses, No clubbing, cyanosis, or edema  LYMPH: No lymphadenopathy noted  SKIN: No rashes or lesions;  Good capillary refill GENERAL: NAD,   HEAD:  Atraumatic, Normocephalic  EYES: EOMI, PERRLA, conjunctiva and sclera clear  ENMT: No tonsillar erythema, exudates, or enlargement; dry mucous membranes, poor  dentition, No lesions  NECK: Supple, normal appearance, No JVD; Normal thyroid; Trachea midline  NERVOUS SYSTEM:  Alert & Oriented X3,  Motor Strength 5/5 B/L upper and lower extremities, sensation intact, + tongue fasciculation, and bl hand tremors   CHEST/LUNG: Lungs clear to auscultation bilaterally, No rales, rhonchi, wheezing   HEART: Regular rate and rhythm; No murmurs, rubs, or gallops  ABDOMEN: Soft, tender to palpation throughout, Nondistended; Bowel sounds present  EXTREMITIES:  2+ Peripheral Pulses, No clubbing, cyanosis, or edema  LYMPH: No lymphadenopathy noted  SKIN: No rashes or lesions;  Good capillary refill

## 2021-11-05 NOTE — ED ADULT TRIAGE NOTE - CHIEF COMPLAINT QUOTE
biba with c/o nausea and vomiting x 3 days , BGL on scene was 230 as per ems  PATIENT is vomiting  to a clear fluid in triage

## 2021-11-05 NOTE — H&P ADULT - HISTORY OF PRESENT ILLNESS
INCOMPLETE Pt is a 65 y/o M with PMH of DM, HTn, alcohol abuser, who presented to the hospital with complains of nausea, vomiting, and abdominal pain. Pt states that he had been on a binge drink for the past 3 days drinkin "6 1/4ths of vodka per day", last drink 1 day PTA. He says that his pain was "50/10" before he came to the hospital, but improved in the hospital. He had nausea and vomiting, but unable to say how many times he vomited. He endorses that he didn't eat or drink anything else for 3 days. Pt denies any other complains including, headache, dizziness, chest pain, palpitations, diarrhea, constipation, urinary sxs, numbness or weakness.

## 2021-11-05 NOTE — H&P ADULT - PROBLEM SELECTOR PLAN 1
Pt with abd pain ,nausea and vomiting, chronic alcohol use and no food or water intake for 3 days  P/w AG of 30, started on Iv fluids, AG closed to 18  Pt sxs improving  wbc 15, likely reactive to vomiting  CT abdomen negative  will continue IV fluids  Thiamine, folic acid, mv  start feeding  monitor electrolytes inlcuding mag and phos, watch for refeeding syndrome  monitor bmp

## 2021-11-05 NOTE — H&P ADULT - NSHPSOCIALHISTORY_GEN_ALL_CORE
+ ETOH use, vodka daily  no tobacco, no recreational drugs  no work, no money to pay for medications

## 2021-11-05 NOTE — ED PROVIDER NOTE - OBJECTIVE STATEMENT
65 y/o man, h/o DM, alcoholism, c/o 2-3 days of nausea and vomiting, abdominal discomfort.  No blood in vomitus.  Denies fever/chills/dysuria.  Pt says he uses insulin and another oral diabetes medication, but cannot recall the name.  He admits to drinking alcohol about 3-4 days per week.  Denies drug use.

## 2021-11-05 NOTE — H&P ADULT - PROBLEM SELECTOR PLAN 2
alcohol abuser  last drink 1 day PTA  current CIWA 4  will start on ativan taper and PRN 2q 4  Thiamine, folic acid, mv  IV fluids  encourage pO intake  social work consult

## 2021-11-05 NOTE — ED PROVIDER NOTE - NS_EDPROVIDERDISPOUSERTYPE_ED_A_ED
32 yo F with PMH of IBS, GERD, renal stones comes in with complaints of flank pain, nausea. Patient states this started yesterday pain 10/10. Patient came to ER had CT scan showing 4mm renal stone. Urology Dr. Mahajan consulted.   As per patient shes going to [facility] in a month for further work up of her IBS. Attending Attestation (For Attendings USE Only)...

## 2021-11-05 NOTE — H&P ADULT - NSHPREVIEWOFSYSTEMS_GEN_ALL_CORE
CONSTITUTIONAL: No fever, weight loss, or fatigue  EYES: No eye pain, visual disturbances, or discharge  ENT:  No difficulty hearing, tinnitus, vertigo; No sinus or throat pain  NECK: No pain or stiffness  RESPIRATORY: No cough, wheezing, chills or hemoptysis; No Shortness of Breath  CARDIOVASCULAR: No chest pain, palpitations, passing out, dizziness, or leg swelling  GASTROINTESTINAL: No abdominal or epigastric pain. No nausea, vomiting, or hematemesis; No diarrhea or constipation. No melena or hematochezia.  GENITOURINARY: No dysuria, frequency, hematuria, or incontinence  NEUROLOGICAL: No headaches, memory loss, loss of strength, numbness, or tremors  SKIN: No itching, burning, rashes, or lesions   LYMPH Nodes: No enlarged glands  ENDOCRINE: No heat or cold intolerance; No hair loss  MUSCULOSKELETAL: No joint pain or swelling; No muscle, back, No extremity pain  PSYCHIATRIC: No depression, anxiety, mood swings, or difficulty sleeping  HEME/LYMPH: No easy bruising, or bleeding gums  ALLERGY AND IMMUNOLOGIC: No hives or eczema CONSTITUTIONAL: No fever, weight loss, or fatigue  EYES: No eye pain, visual disturbances, or discharge  ENT:  No difficulty hearing, tinnitus, vertigo; No sinus or throat pain  NECK: No pain or stiffness  RESPIRATORY: No cough, wheezing, chills or hemoptysis; No Shortness of Breath  CARDIOVASCULAR: No chest pain, palpitations, passing out, dizziness, or leg swelling  GASTROINTESTINAL: + generalized abdominal. + nausea, vomiting, No hematemesis; No diarrhea or constipation. No melena or hematochezia.  GENITOURINARY: No dysuria, frequency, hematuria, or incontinence  NEUROLOGICAL: No headaches, memory loss, loss of strength, numbness, or tremors  SKIN: No itching, burning, rashes, or lesions   LYMPH Nodes: No enlarged glands  ENDOCRINE: No heat or cold intolerance; No hair loss  MUSCULOSKELETAL: No joint pain or swelling; No muscle, back, No extremity pain  PSYCHIATRIC: No depression, anxiety, mood swings, or difficulty sleeping  HEME/LYMPH: No easy bruising, or bleeding gums  ALLERGY AND IMMUNOLOGIC: No hives or eczema
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
## 2021-11-05 NOTE — H&P ADULT - ATTENDING COMMENTS
Patient seen and examined. Case discussed with Dr. Durán. Communicated with patient via  #[id number]. In brief, this is a 67 yo M with DM2, HTN, EtOH abuse and dependence who presents with abdominal pain, nausea, vomiting, in the setting of not eating food or water and binge drinking alcohol found to have anion gap metabolic acidosis with a lactate of 6.1 that has since improved with IVF. Suspect likely starvation vs alcoholic ketoacidosis as the etiology. Anion gap has improved from 30 to 18 with IVF and patient is not acidotic at this time. Continue IVF. CT A/P only reveals diverticulosis without diverticulitis and no other pathology to explain the symptoms. Patient with CIWA of 4 on admission, tachycardic on admission. Will start PRN CIWA with Ativan taper for management of alcohol withdrawal. Remaining care as noted above.

## 2021-11-05 NOTE — H&P ADULT - PROBLEM SELECTOR PLAN 3
pt with Cr of 2.8 on admission, baseline 0.8  likeley due to poor po intake  improving after IV fluids  continue IV fliuids  no utility for urine studies as pt already got fluids  continue to monitor bmp

## 2021-11-05 NOTE — PATIENT PROFILE ADULT - NSPROMEDSBROUGHTTOHOSP_GEN_A_NUR
Arterial Line Placement Procedure Note:  Indication: Need for serial blood work, respiratory failure    Consent: The spouse was counseled regarding the procedure, its indications, risks, potential complications and alternatives, and any questions were answered. Consent was obtained to proceed.    Mario's Test: Not indicated in this particular procedure    Procedure: The skin over the Right femoral artery was draped in a sterile fashion.  Local anesthesia was obtained by infiltration using 1% Lidocaine without epinephrine.  A n 18 gauge angiocath was inserted using a modified Seldinger technique with excellent blood return.  The transducer was attached and secured to the skin with sutures.  Arterial waveforms were observed on the monitor and interpreted to be adequate.  Good distal perfusion following  the procedure was noted. A sterile dressing was applied.     The patient tolerated the procedure: well.     Complications: None    Gonsalo Murray MD          no

## 2021-11-05 NOTE — ED PROVIDER NOTE - CLINICAL SUMMARY MEDICAL DECISION MAKING FREE TEXT BOX
67 y/o man, h/o DM, alcoholism, c/o 2-3 days of nausea and vomiting, abdominal discomfort.  No blood in vomitus--labs, EKG, CT A/P, urine, IVF, antiemetic, reassess.

## 2021-11-05 NOTE — H&P ADULT - PROBLEM SELECTOR PLAN 4
pmh of DM on insulin, and other medications at home, doesn't recall name or dosages  pt says he isn't taking anything because of no money  will start on insulin moderate sliding scale  f/u hgb a1c  monitor bs and adjust as needed  will need vivo meds upon discharge

## 2021-11-06 LAB
A1C WITH ESTIMATED AVERAGE GLUCOSE RESULT: 7.7 % — HIGH (ref 4–5.6)
ALBUMIN SERPL ELPH-MCNC: 2.8 G/DL — LOW (ref 3.5–5)
ALP SERPL-CCNC: 92 U/L — SIGNIFICANT CHANGE UP (ref 40–120)
ALT FLD-CCNC: 40 U/L DA — SIGNIFICANT CHANGE UP (ref 10–60)
ANION GAP SERPL CALC-SCNC: 6 MMOL/L — SIGNIFICANT CHANGE UP (ref 5–17)
AST SERPL-CCNC: 42 U/L — HIGH (ref 10–40)
BILIRUB SERPL-MCNC: 1.1 MG/DL — SIGNIFICANT CHANGE UP (ref 0.2–1.2)
BUN SERPL-MCNC: 16 MG/DL — SIGNIFICANT CHANGE UP (ref 7–18)
CALCIUM SERPL-MCNC: 8.8 MG/DL — SIGNIFICANT CHANGE UP (ref 8.4–10.5)
CHLORIDE SERPL-SCNC: 106 MMOL/L — SIGNIFICANT CHANGE UP (ref 96–108)
CHOLEST SERPL-MCNC: 196 MG/DL — SIGNIFICANT CHANGE UP
CO2 SERPL-SCNC: 27 MMOL/L — SIGNIFICANT CHANGE UP (ref 22–31)
COVID-19 NUCLEOCAPSID GAM AB INTERP: POSITIVE
COVID-19 NUCLEOCAPSID TOTAL GAM ANTIBODY RESULT: 36.4 INDEX — HIGH
COVID-19 SPIKE DOMAIN AB INTERP: POSITIVE
COVID-19 SPIKE DOMAIN ANTIBODY RESULT: >250 U/ML — HIGH
CREAT SERPL-MCNC: 0.67 MG/DL — SIGNIFICANT CHANGE UP (ref 0.5–1.3)
ESTIMATED AVERAGE GLUCOSE: 174 MG/DL — HIGH (ref 68–114)
GLUCOSE BLDC GLUCOMTR-MCNC: 138 MG/DL — HIGH (ref 70–99)
GLUCOSE BLDC GLUCOMTR-MCNC: 169 MG/DL — HIGH (ref 70–99)
GLUCOSE BLDC GLUCOMTR-MCNC: 172 MG/DL — HIGH (ref 70–99)
GLUCOSE BLDC GLUCOMTR-MCNC: 192 MG/DL — HIGH (ref 70–99)
GLUCOSE SERPL-MCNC: 187 MG/DL — HIGH (ref 70–99)
HCT VFR BLD CALC: 33.4 % — LOW (ref 39–50)
HDLC SERPL-MCNC: 50 MG/DL — SIGNIFICANT CHANGE UP
HGB BLD-MCNC: 11.3 G/DL — LOW (ref 13–17)
LIPID PNL WITH DIRECT LDL SERPL: 106 MG/DL — HIGH
MAGNESIUM SERPL-MCNC: 2.3 MG/DL — SIGNIFICANT CHANGE UP (ref 1.6–2.6)
MCHC RBC-ENTMCNC: 32.4 PG — SIGNIFICANT CHANGE UP (ref 27–34)
MCHC RBC-ENTMCNC: 33.8 GM/DL — SIGNIFICANT CHANGE UP (ref 32–36)
MCV RBC AUTO: 95.7 FL — SIGNIFICANT CHANGE UP (ref 80–100)
NON HDL CHOLESTEROL: 146 MG/DL — HIGH
NRBC # BLD: 0 /100 WBCS — SIGNIFICANT CHANGE UP (ref 0–0)
PHOSPHATE SERPL-MCNC: 1.1 MG/DL — LOW (ref 2.5–4.5)
PLATELET # BLD AUTO: 116 K/UL — LOW (ref 150–400)
POTASSIUM SERPL-MCNC: 3.7 MMOL/L — SIGNIFICANT CHANGE UP (ref 3.5–5.3)
POTASSIUM SERPL-SCNC: 3.7 MMOL/L — SIGNIFICANT CHANGE UP (ref 3.5–5.3)
PROT SERPL-MCNC: 7.5 G/DL — SIGNIFICANT CHANGE UP (ref 6–8.3)
RBC # BLD: 3.49 M/UL — LOW (ref 4.2–5.8)
RBC # FLD: 14.2 % — SIGNIFICANT CHANGE UP (ref 10.3–14.5)
SARS-COV-2 IGG+IGM SERPL QL IA: 36.4 INDEX — HIGH
SARS-COV-2 IGG+IGM SERPL QL IA: >250 U/ML — HIGH
SARS-COV-2 IGG+IGM SERPL QL IA: POSITIVE
SARS-COV-2 IGG+IGM SERPL QL IA: POSITIVE
SODIUM SERPL-SCNC: 139 MMOL/L — SIGNIFICANT CHANGE UP (ref 135–145)
TRIGL SERPL-MCNC: 198 MG/DL — HIGH
WBC # BLD: 6.23 K/UL — SIGNIFICANT CHANGE UP (ref 3.8–10.5)
WBC # FLD AUTO: 6.23 K/UL — SIGNIFICANT CHANGE UP (ref 3.8–10.5)

## 2021-11-06 PROCEDURE — 99233 SBSQ HOSP IP/OBS HIGH 50: CPT

## 2021-11-06 RX ORDER — SODIUM CHLORIDE 9 MG/ML
1000 INJECTION, SOLUTION INTRAVENOUS
Refills: 0 | Status: DISCONTINUED | OUTPATIENT
Start: 2021-11-06 | End: 2021-11-07

## 2021-11-06 RX ADMIN — SODIUM CHLORIDE 100 MILLILITER(S): 9 INJECTION, SOLUTION INTRAVENOUS at 17:36

## 2021-11-06 RX ADMIN — Medication 85 MILLIMOLE(S): at 15:01

## 2021-11-06 RX ADMIN — Medication 2: at 08:14

## 2021-11-06 RX ADMIN — Medication 1.5 MILLIGRAM(S): at 18:26

## 2021-11-06 RX ADMIN — Medication 1.5 MILLIGRAM(S): at 21:40

## 2021-11-06 RX ADMIN — Medication 2: at 17:37

## 2021-11-06 RX ADMIN — HEPARIN SODIUM 5000 UNIT(S): 5000 INJECTION INTRAVENOUS; SUBCUTANEOUS at 21:40

## 2021-11-06 RX ADMIN — Medication 100 MILLIGRAM(S): at 12:06

## 2021-11-06 RX ADMIN — Medication 2 MILLIGRAM(S): at 06:12

## 2021-11-06 RX ADMIN — HEPARIN SODIUM 5000 UNIT(S): 5000 INJECTION INTRAVENOUS; SUBCUTANEOUS at 06:12

## 2021-11-06 RX ADMIN — Medication 1 MILLIGRAM(S): at 12:06

## 2021-11-06 RX ADMIN — Medication 1 TABLET(S): at 12:05

## 2021-11-06 RX ADMIN — Medication 1.5 MILLIGRAM(S): at 15:58

## 2021-11-06 RX ADMIN — Medication 2 MILLIGRAM(S): at 02:12

## 2021-11-06 RX ADMIN — HEPARIN SODIUM 5000 UNIT(S): 5000 INJECTION INTRAVENOUS; SUBCUTANEOUS at 15:02

## 2021-11-06 RX ADMIN — Medication 1.5 MILLIGRAM(S): at 11:58

## 2021-11-06 NOTE — PROGRESS NOTE ADULT - SUBJECTIVE AND OBJECTIVE BOX
Patient seen and examined this afternoon. States he feels well, has mild nausea but improved and is tolerated his diet. Otherwise denies acute complaints.    folic acid 1 milliGRAM(s) Oral daily  heparin   Injectable 5000 Unit(s) SubCutaneous every 8 hours  insulin lispro (ADMELOG) corrective regimen sliding scale   SubCutaneous three times a day before meals  insulin lispro (ADMELOG) corrective regimen sliding scale   SubCutaneous at bedtime  lactated ringers. 1000 milliLiter(s) IV Continuous <Continuous>  LORazepam   Injectable 1.5 milliGRAM(s) IV Push every 4 hours  LORazepam   Injectable 1 milliGRAM(s) IV Push every 2 hours PRN  LORazepam   Injectable   IV Push   multivitamin 1 Tablet(s) Oral daily  ondansetron Injectable 4 milliGRAM(s) IV Push once PRN  thiamine 100 milliGRAM(s) Oral daily      VITALS:  Vital Signs Last 24 Hrs  T(C): 36.3 (06 Nov 2021 16:12), Max: 37.3 (05 Nov 2021 16:25)  T(F): 97.3 (06 Nov 2021 16:12), Max: 99.2 (05 Nov 2021 16:25)  HR: 77 (06 Nov 2021 16:12) (77 - 99)  BP: 116/64 (06 Nov 2021 16:12) (116/64 - 130/76)  BP(mean): --  RR: 17 (06 Nov 2021 16:12) (17 - 19)  SpO2: 96% (06 Nov 2021 16:12) (96% - 100%)    EXAM:  GEN: alert, in no acute distress  CVS: rrr, normal s1/s2  RESP: clear bilaterally, no w/r/r  ABD: soft, nontender, nondistended, normoactive bowel sounds  EXT: no LE edema  NEURO: aaox3    LABS:                        11.3   6.23  )-----------( 116      ( 06 Nov 2021 07:03 )             33.4     11-06    139  |  106  |  16  ----------------------------<  187<H>  3.7   |  27  |  0.67    Ca    8.8      06 Nov 2021 07:03  Phos  1.1     11-06  Mg     2.3     11-06    TPro  7.5  /  Alb  2.8<L>  /  TBili  1.1  /  DBili  x   /  AST  42<H>  /  ALT  40  /  AlkPhos  92  11-06      IMAGING: reviewed

## 2021-11-07 LAB
ALBUMIN SERPL ELPH-MCNC: 2.8 G/DL — LOW (ref 3.5–5)
ALP SERPL-CCNC: 93 U/L — SIGNIFICANT CHANGE UP (ref 40–120)
ALT FLD-CCNC: 41 U/L DA — SIGNIFICANT CHANGE UP (ref 10–60)
ANION GAP SERPL CALC-SCNC: 6 MMOL/L — SIGNIFICANT CHANGE UP (ref 5–17)
AST SERPL-CCNC: 45 U/L — HIGH (ref 10–40)
BILIRUB SERPL-MCNC: 1.1 MG/DL — SIGNIFICANT CHANGE UP (ref 0.2–1.2)
BUN SERPL-MCNC: 10 MG/DL — SIGNIFICANT CHANGE UP (ref 7–18)
CALCIUM SERPL-MCNC: 8.6 MG/DL — SIGNIFICANT CHANGE UP (ref 8.4–10.5)
CHLORIDE SERPL-SCNC: 105 MMOL/L — SIGNIFICANT CHANGE UP (ref 96–108)
CO2 SERPL-SCNC: 26 MMOL/L — SIGNIFICANT CHANGE UP (ref 22–31)
CREAT SERPL-MCNC: 0.52 MG/DL — SIGNIFICANT CHANGE UP (ref 0.5–1.3)
GLUCOSE BLDC GLUCOMTR-MCNC: 162 MG/DL — HIGH (ref 70–99)
GLUCOSE BLDC GLUCOMTR-MCNC: 166 MG/DL — HIGH (ref 70–99)
GLUCOSE BLDC GLUCOMTR-MCNC: 171 MG/DL — HIGH (ref 70–99)
GLUCOSE BLDC GLUCOMTR-MCNC: 175 MG/DL — HIGH (ref 70–99)
GLUCOSE SERPL-MCNC: 179 MG/DL — HIGH (ref 70–99)
HCT VFR BLD CALC: 35.7 % — LOW (ref 39–50)
HGB BLD-MCNC: 11.9 G/DL — LOW (ref 13–17)
MAGNESIUM SERPL-MCNC: 2.1 MG/DL — SIGNIFICANT CHANGE UP (ref 1.6–2.6)
MCHC RBC-ENTMCNC: 32.2 PG — SIGNIFICANT CHANGE UP (ref 27–34)
MCHC RBC-ENTMCNC: 33.3 GM/DL — SIGNIFICANT CHANGE UP (ref 32–36)
MCV RBC AUTO: 96.5 FL — SIGNIFICANT CHANGE UP (ref 80–100)
NRBC # BLD: 0 /100 WBCS — SIGNIFICANT CHANGE UP (ref 0–0)
PHOSPHATE SERPL-MCNC: 2.4 MG/DL — LOW (ref 2.5–4.5)
PLATELET # BLD AUTO: 108 K/UL — LOW (ref 150–400)
POTASSIUM SERPL-MCNC: 3.6 MMOL/L — SIGNIFICANT CHANGE UP (ref 3.5–5.3)
POTASSIUM SERPL-SCNC: 3.6 MMOL/L — SIGNIFICANT CHANGE UP (ref 3.5–5.3)
PROT SERPL-MCNC: 7.6 G/DL — SIGNIFICANT CHANGE UP (ref 6–8.3)
RBC # BLD: 3.7 M/UL — LOW (ref 4.2–5.8)
RBC # FLD: 13.5 % — SIGNIFICANT CHANGE UP (ref 10.3–14.5)
SODIUM SERPL-SCNC: 137 MMOL/L — SIGNIFICANT CHANGE UP (ref 135–145)
WBC # BLD: 3.83 K/UL — SIGNIFICANT CHANGE UP (ref 3.8–10.5)
WBC # FLD AUTO: 3.83 K/UL — SIGNIFICANT CHANGE UP (ref 3.8–10.5)

## 2021-11-07 PROCEDURE — 99233 SBSQ HOSP IP/OBS HIGH 50: CPT | Mod: GC

## 2021-11-07 RX ORDER — SODIUM,POTASSIUM PHOSPHATES 278-250MG
1 POWDER IN PACKET (EA) ORAL THREE TIMES A DAY
Refills: 0 | Status: COMPLETED | OUTPATIENT
Start: 2021-11-07 | End: 2021-11-08

## 2021-11-07 RX ADMIN — Medication 1.5 MILLIGRAM(S): at 06:16

## 2021-11-07 RX ADMIN — HEPARIN SODIUM 5000 UNIT(S): 5000 INJECTION INTRAVENOUS; SUBCUTANEOUS at 13:02

## 2021-11-07 RX ADMIN — HEPARIN SODIUM 5000 UNIT(S): 5000 INJECTION INTRAVENOUS; SUBCUTANEOUS at 06:17

## 2021-11-07 RX ADMIN — Medication 100 MILLIGRAM(S): at 13:02

## 2021-11-07 RX ADMIN — HEPARIN SODIUM 5000 UNIT(S): 5000 INJECTION INTRAVENOUS; SUBCUTANEOUS at 21:36

## 2021-11-07 RX ADMIN — Medication 1 MILLIGRAM(S): at 21:48

## 2021-11-07 RX ADMIN — Medication 2: at 08:19

## 2021-11-07 RX ADMIN — Medication 1 MILLIGRAM(S): at 17:12

## 2021-11-07 RX ADMIN — Medication 1 PACKET(S): at 21:36

## 2021-11-07 RX ADMIN — Medication 1 MILLIGRAM(S): at 13:02

## 2021-11-07 RX ADMIN — Medication 2: at 12:15

## 2021-11-07 RX ADMIN — Medication 1 TABLET(S): at 13:02

## 2021-11-07 RX ADMIN — Medication 2: at 17:07

## 2021-11-07 RX ADMIN — Medication 1 PACKET(S): at 13:02

## 2021-11-07 NOTE — PROGRESS NOTE ADULT - SUBJECTIVE AND OBJECTIVE BOX
PGY-1 Progress Note discussed with attending    PAGER #: [[phone]] TILL 5:00 PM  PLEASE CONTACT ON CALL TEAM:  - On Call Team (Please refer to Karen) FROM 5:00 PM - 8:30PM  - Nightfloat Team FROM 8:30 -7:30 AM    CHIEF COMPLAINT & BRIEF HOSPITAL COURSE:    Pt is a 65 y/o M with PMH of DM, HTn, alcohol abuser, who presented to the hospital with complains of nausea, vomiting, and abdominal pain. Pt states that he had been on a binge drink for the past 3 days drinkin "6 1/4ths of vodka per day", last drink 1 day PTA. He says that his pain was "50/10" before he came to the hospital, but improved in the hospital. He had nausea and vomiting, but unable to say how many times he vomited. He endorses that he didn't eat or drink anything else for 3 days. Pt denies any other complains including, headache, dizziness, chest pain, palpitations, diarrhea, constipation, urinary sxs, numbness or weakness.     In the ED: Low grade fever 99.2,  BP stable 144s O2 sats 98% on RA  Exam: -ve CIWA 4  Labs: wbc 13 Cl 92, Na normal, Ketoacidosis, lactic acidoisis 6.4 (acetone, bicarb 15, gap 30), HLD, BAL <3 lipase -ve  CT ab - heaptic steatosis, right hepatic lobe nodule (calcified)      He was admitted for Ativan 2q4 taper, thiamine folate, IV fluids    INTERVAL HPI/OVERNIGHT EVENTS:   MEDICATIONS  (STANDING):  folic acid 1 milliGRAM(s) Oral daily  heparin   Injectable 5000 Unit(s) SubCutaneous every 8 hours  insulin lispro (ADMELOG) corrective regimen sliding scale   SubCutaneous three times a day before meals  insulin lispro (ADMELOG) corrective regimen sliding scale   SubCutaneous at bedtime  lactated ringers. 1000 milliLiter(s) (100 mL/Hr) IV Continuous <Continuous>  LORazepam   Injectable 1.5 milliGRAM(s) IV Push every 4 hours  LORazepam   Injectable 1 milliGRAM(s) IV Push every 4 hours  LORazepam   Injectable   IV Push   multivitamin 1 Tablet(s) Oral daily  thiamine 100 milliGRAM(s) Oral daily    MEDICATIONS  (PRN):  LORazepam   Injectable 1 milliGRAM(s) IV Push every 2 hours PRN CIWA-Ar score 8 or greater  ondansetron Injectable 4 milliGRAM(s) IV Push once PRN Nausea and/or Vomiting      REVIEW OF SYSTEMS:  CONSTITUTIONAL: No fever, weight loss, or fatigue  RESPIRATORY: No cough, wheezing, chills or hemoptysis; No shortness of breath  CARDIOVASCULAR: No chest pain, palpitations, dizziness, or leg swelling  GASTROINTESTINAL: No abdominal pain. No nausea, vomiting, or hematemesis; No diarrhea or constipation. No melena or hematochezia.  GENITOURINARY: No dysuria or hematuria, urinary frequency  NEUROLOGICAL: No headaches, memory loss, loss of strength, numbness, or tremors  SKIN: No itching, burning, rashes, or lesions     Vital Signs Last 24 Hrs  T(C): 36.4 (07 Nov 2021 04:44), Max: 36.8 (06 Nov 2021 11:13)  T(F): 97.5 (07 Nov 2021 04:44), Max: 98.3 (07 Nov 2021 00:28)  HR: 85 (07 Nov 2021 04:44) (77 - 86)  BP: 124/75 (07 Nov 2021 04:44) (116/64 - 129/74)  BP(mean): --  RR: 17 (07 Nov 2021 04:44) (17 - 18)  SpO2: 93% (07 Nov 2021 04:44) (93% - 100%)    PHYSICAL EXAMINATION:  GENERAL: NAD, well built  HEAD:  Atraumatic, Normocephalic  EYES:  conjunctiva and sclera clear  NECK: Supple, No JVD, Normal thyroid  CHEST/LUNG: Clear to auscultation. Clear to percussion bilaterally; No rales, rhonchi, wheezing, or rubs  HEART: Regular rate and rhythm; No murmurs, rubs, or gallops  ABDOMEN: Soft, Nontender, Nondistended; Bowel sounds present  NERVOUS SYSTEM:  Alert & Oriented X3,    EXTREMITIES:  2+ Peripheral Pulses, No clubbing, cyanosis, or edema  SKIN: warm dry                          11.3   6.23  )-----------( 116      ( 06 Nov 2021 07:03 )             33.4     11-06    139  |  106  |  16  ----------------------------<  187<H>  3.7   |  27  |  0.67    Ca    8.8      06 Nov 2021 07:03  Phos  1.1     11-06  Mg     2.3     11-06    TPro  7.5  /  Alb  2.8<L>  /  TBili  1.1  /  DBili  x   /  AST  42<H>  /  ALT  40  /  AlkPhos  92  11-06    LIVER FUNCTIONS - ( 06 Nov 2021 07:03 )  Alb: 2.8 g/dL / Pro: 7.5 g/dL / ALK PHOS: 92 U/L / ALT: 40 U/L DA / AST: 42 U/L / GGT: x                   CAPILLARY BLOOD GLUCOSE      RADIOLOGY & ADDITIONAL TESTS:                   PGY-1 Progress Note discussed with attending    PAGER #: [[phone]] TILL 5:00 PM  PLEASE CONTACT ON CALL TEAM:  - On Call Team (Please refer to Karen) FROM 5:00 PM - 8:30PM  - Nightfloat Team FROM 8:30 -7:30 AM    CHIEF COMPLAINT & BRIEF HOSPITAL COURSE:    Pt is a 65 y/o M with PMH of DM, HTn, alcohol abuser, who presented to the hospital with complains of nausea, vomiting, and abdominal pain. Pt states that he had been on a binge drink for the past 3 days drinkin "6 1/4ths of vodka per day", last drink 1 day PTA. He says that his pain was "50/10" before he came to the hospital, but improved in the hospital. He had nausea and vomiting, but unable to say how many times he vomited. He endorses that he didn't eat or drink anything else for 3 days. Pt denies any other complains including, headache, dizziness, chest pain, palpitations, diarrhea, constipation, urinary sxs, numbness or weakness.     In the ED: Low grade fever 99.2,  BP stable 144s O2 sats 98% on RA  Exam: -ve CIWA 4  Labs: wbc 13 Cl 92, Na normal, Ketoacidosis, lactic acidoisis 6.4 (acetone, bicarb 15, gap 30), HLD, BAL <3 lipase -ve  CT ab - heaptic steatosis, right hepatic lobe nodule (calcified)      He was admitted for Ativan 2q4 taper, thiamine folate, IV fluids    INTERVAL HPI/OVERNIGHT EVENTS:   No events  no n/v   now using 1 q4     MEDICATIONS  (STANDING):  folic acid 1 milliGRAM(s) Oral daily  heparin   Injectable 5000 Unit(s) SubCutaneous every 8 hours  insulin lispro (ADMELOG) corrective regimen sliding scale   SubCutaneous three times a day before meals  insulin lispro (ADMELOG) corrective regimen sliding scale   SubCutaneous at bedtime  lactated ringers. 1000 milliLiter(s) (100 mL/Hr) IV Continuous <Continuous>  LORazepam   Injectable 1.5 milliGRAM(s) IV Push every 4 hours  LORazepam   Injectable 1 milliGRAM(s) IV Push every 4 hours  LORazepam   Injectable   IV Push   multivitamin 1 Tablet(s) Oral daily  thiamine 100 milliGRAM(s) Oral daily    MEDICATIONS  (PRN):  LORazepam   Injectable 1 milliGRAM(s) IV Push every 2 hours PRN CIWA-Ar score 8 or greater  ondansetron Injectable 4 milliGRAM(s) IV Push once PRN Nausea and/or Vomiting      REVIEW OF SYSTEMS:  CONSTITUTIONAL: No fever, weight loss, or fatigue  RESPIRATORY: No cough, wheezing, chills or hemoptysis; No shortness of breath  CARDIOVASCULAR: No chest pain, palpitations, dizziness, or leg swelling  GASTROINTESTINAL: No abdominal pain. No nausea, vomiting, or hematemesis; No diarrhea or constipation. No melena or hematochezia.  GENITOURINARY: No dysuria or hematuria, urinary frequency  NEUROLOGICAL: No headaches, memory loss, loss of strength, numbness, or tremors  SKIN: No itching, burning, rashes, or lesions     Vital Signs Last 24 Hrs  T(C): 36.4 (07 Nov 2021 04:44), Max: 36.8 (06 Nov 2021 11:13)  T(F): 97.5 (07 Nov 2021 04:44), Max: 98.3 (07 Nov 2021 00:28)  HR: 85 (07 Nov 2021 04:44) (77 - 86)  BP: 124/75 (07 Nov 2021 04:44) (116/64 - 129/74)  BP(mean): --  RR: 17 (07 Nov 2021 04:44) (17 - 18)  SpO2: 93% (07 Nov 2021 04:44) (93% - 100%)    PHYSICAL EXAMINATION:  GENERAL: NAD,   HEAD:  Atraumatic, Normocephalic   NECK: Supple, No JVD, Normal thyroid  CHEST/LUNG: Clear to auscultation. Clear to percussion bilaterally; No rales, rhonchi, wheezing, or rubs  HEART: Regular rate and rhythm; No murmurs, rubs, or gallops  ABDOMEN: Soft, Nontender, Nondistended; Bowel sounds present  NERVOUS SYSTEM:  Alert & Oriented                                 CIWA 0                                    EXTREMITIES:  2+ Peripheral Pulses, No clubbing, cyanosis, or edema  SKIN: warm dry                          11.3   6.23  )-----------( 116      ( 06 Nov 2021 07:03 )             33.4     11-06    139  |  106  |  16  ----------------------------<  187<H>  3.7   |  27  |  0.67    Ca    8.8      06 Nov 2021 07:03  Phos  1.1     11-06  Mg     2.3     11-06    TPro  7.5  /  Alb  2.8<L>  /  TBili  1.1  /  DBili  x   /  AST  42<H>  /  ALT  40  /  AlkPhos  92  11-06    LIVER FUNCTIONS - ( 06 Nov 2021 07:03 )  Alb: 2.8 g/dL / Pro: 7.5 g/dL / ALK PHOS: 92 U/L / ALT: 40 U/L DA / AST: 42 U/L / GGT: x                   CAPILLARY BLOOD GLUCOSE      RADIOLOGY & ADDITIONAL TESTS:

## 2021-11-07 NOTE — PROGRESS NOTE ADULT - PROBLEM SELECTOR PLAN 1
Pt with abd pain ,nausea and vomiting, chronic alcohol use and no food or water intake for 3 days  P/w AG of 30, started on Iv fluids, AG closed to 18  Pt sxs improving  wbc 15, likely reactive to vomiting  CT abdomen negative  will continue IV fluids  Thiamine, folic acid, mv  start feeding  monitor electrolytes inlcuding mag and phos, watch for refeeding syndrome  monitor bmp Pt with abd pain ,nausea and vomiting, chronic alcohol use and no food or water intake for 3 days  P/w AG of 30, started on Iv fluids, AG closed   Pt sxs improving  wbc 15, likely reactive to vomiting- resolved  CT abdomen negative  will continue IV fluids  Thiamine, folic acid, mv  started feeding, on full liquids, can advance tonight  monitor electrolytes including mag and phos, watch for refeeding syndrome  monitor bmp

## 2021-11-07 NOTE — PROGRESS NOTE ADULT - PROBLEM SELECTOR PLAN 4
pmh of DM on insulin, and other medications at home, doesn't recall name or dosages  pt says he isn't taking anything because of no money  will start on insulin moderate sliding scale  f/u hgb a1c  monitor bs and adjust as needed  will need vivo meds upon discharge pmh of DM on insulin, and other medications at home, doesn't recall name or dosages  pt says he isn't taking anything because of no money  will start on insulin moderate sliding scale   hgb a1c 7.7  monitor bs and adjust as needed so far controlled on ISS alone  will need vivo meds upon discharge

## 2021-11-07 NOTE — PROGRESS NOTE ADULT - PROBLEM SELECTOR PLAN 5
pmh of htn, unclear of home meds  monitor bp, can be elevated in setting of withdrawal  if continues to remain elevated can start medications pmh of htn, unclear of home meds  monitor bp, can be elevated in setting of withdrawal- controlled at 116/62  if continues to remain elevated can start medications

## 2021-11-07 NOTE — PROGRESS NOTE ADULT - PROBLEM SELECTOR PLAN 2
alcohol abuser  last drink 1 day PTA  current CIWA 4  will start on ativan taper and PRN 2q 4  Thiamine, folic acid, mv  IV fluids  encourage pO intake  social work consult alcohol abuser  last drink 1 day PTA  current CIWA 0  on ativan taper and PRN 1q 4 currently- patient is compliant  Thiamine, folic acid, mv  IV fluids  encourage pO intake  social work consult in

## 2021-11-08 LAB
ALBUMIN SERPL ELPH-MCNC: 3 G/DL — LOW (ref 3.5–5)
ALP SERPL-CCNC: 102 U/L — SIGNIFICANT CHANGE UP (ref 40–120)
ALT FLD-CCNC: 49 U/L DA — SIGNIFICANT CHANGE UP (ref 10–60)
ANION GAP SERPL CALC-SCNC: 10 MMOL/L — SIGNIFICANT CHANGE UP (ref 5–17)
AST SERPL-CCNC: 55 U/L — HIGH (ref 10–40)
BASOPHILS # BLD AUTO: 0.02 K/UL — SIGNIFICANT CHANGE UP (ref 0–0.2)
BASOPHILS NFR BLD AUTO: 0.4 % — SIGNIFICANT CHANGE UP (ref 0–2)
BILIRUB SERPL-MCNC: 0.9 MG/DL — SIGNIFICANT CHANGE UP (ref 0.2–1.2)
BUN SERPL-MCNC: 11 MG/DL — SIGNIFICANT CHANGE UP (ref 7–18)
CALCIUM SERPL-MCNC: 9.4 MG/DL — SIGNIFICANT CHANGE UP (ref 8.4–10.5)
CHLORIDE SERPL-SCNC: 104 MMOL/L — SIGNIFICANT CHANGE UP (ref 96–108)
CO2 SERPL-SCNC: 22 MMOL/L — SIGNIFICANT CHANGE UP (ref 22–31)
CREAT SERPL-MCNC: 0.63 MG/DL — SIGNIFICANT CHANGE UP (ref 0.5–1.3)
EOSINOPHIL # BLD AUTO: 0.2 K/UL — SIGNIFICANT CHANGE UP (ref 0–0.5)
EOSINOPHIL NFR BLD AUTO: 3.6 % — SIGNIFICANT CHANGE UP (ref 0–6)
GLUCOSE BLDC GLUCOMTR-MCNC: 146 MG/DL — HIGH (ref 70–99)
GLUCOSE BLDC GLUCOMTR-MCNC: 166 MG/DL — HIGH (ref 70–99)
GLUCOSE BLDC GLUCOMTR-MCNC: 180 MG/DL — HIGH (ref 70–99)
GLUCOSE BLDC GLUCOMTR-MCNC: 184 MG/DL — HIGH (ref 70–99)
GLUCOSE SERPL-MCNC: 173 MG/DL — HIGH (ref 70–99)
HCT VFR BLD CALC: 36.3 % — LOW (ref 39–50)
HGB BLD-MCNC: 12.3 G/DL — LOW (ref 13–17)
IMM GRANULOCYTES NFR BLD AUTO: 0.2 % — SIGNIFICANT CHANGE UP (ref 0–1.5)
LYMPHOCYTES # BLD AUTO: 2.37 K/UL — SIGNIFICANT CHANGE UP (ref 1–3.3)
LYMPHOCYTES # BLD AUTO: 43.2 % — SIGNIFICANT CHANGE UP (ref 13–44)
MAGNESIUM SERPL-MCNC: 2 MG/DL — SIGNIFICANT CHANGE UP (ref 1.6–2.6)
MCHC RBC-ENTMCNC: 32.6 PG — SIGNIFICANT CHANGE UP (ref 27–34)
MCHC RBC-ENTMCNC: 33.9 GM/DL — SIGNIFICANT CHANGE UP (ref 32–36)
MCV RBC AUTO: 96.3 FL — SIGNIFICANT CHANGE UP (ref 80–100)
MONOCYTES # BLD AUTO: 0.34 K/UL — SIGNIFICANT CHANGE UP (ref 0–0.9)
MONOCYTES NFR BLD AUTO: 6.2 % — SIGNIFICANT CHANGE UP (ref 2–14)
NEUTROPHILS # BLD AUTO: 2.54 K/UL — SIGNIFICANT CHANGE UP (ref 1.8–7.4)
NEUTROPHILS NFR BLD AUTO: 46.4 % — SIGNIFICANT CHANGE UP (ref 43–77)
NRBC # BLD: 0 /100 WBCS — SIGNIFICANT CHANGE UP (ref 0–0)
PHOSPHATE SERPL-MCNC: 3.1 MG/DL — SIGNIFICANT CHANGE UP (ref 2.5–4.5)
PLATELET # BLD AUTO: 133 K/UL — LOW (ref 150–400)
POTASSIUM SERPL-MCNC: 4 MMOL/L — SIGNIFICANT CHANGE UP (ref 3.5–5.3)
POTASSIUM SERPL-SCNC: 4 MMOL/L — SIGNIFICANT CHANGE UP (ref 3.5–5.3)
PROT SERPL-MCNC: 7.9 G/DL — SIGNIFICANT CHANGE UP (ref 6–8.3)
RBC # BLD: 3.77 M/UL — LOW (ref 4.2–5.8)
RBC # FLD: 13.2 % — SIGNIFICANT CHANGE UP (ref 10.3–14.5)
SODIUM SERPL-SCNC: 136 MMOL/L — SIGNIFICANT CHANGE UP (ref 135–145)
WBC # BLD: 5.48 K/UL — SIGNIFICANT CHANGE UP (ref 3.8–10.5)
WBC # FLD AUTO: 5.48 K/UL — SIGNIFICANT CHANGE UP (ref 3.8–10.5)

## 2021-11-08 PROCEDURE — 99233 SBSQ HOSP IP/OBS HIGH 50: CPT | Mod: GC

## 2021-11-08 RX ADMIN — Medication 1 MILLIGRAM(S): at 12:40

## 2021-11-08 RX ADMIN — Medication 2: at 17:26

## 2021-11-08 RX ADMIN — Medication 0.5 MILLIGRAM(S): at 12:45

## 2021-11-08 RX ADMIN — HEPARIN SODIUM 5000 UNIT(S): 5000 INJECTION INTRAVENOUS; SUBCUTANEOUS at 21:09

## 2021-11-08 RX ADMIN — HEPARIN SODIUM 5000 UNIT(S): 5000 INJECTION INTRAVENOUS; SUBCUTANEOUS at 13:58

## 2021-11-08 RX ADMIN — HEPARIN SODIUM 5000 UNIT(S): 5000 INJECTION INTRAVENOUS; SUBCUTANEOUS at 06:29

## 2021-11-08 RX ADMIN — Medication 1 MILLIGRAM(S): at 06:32

## 2021-11-08 RX ADMIN — Medication 0.5 MILLIGRAM(S): at 17:26

## 2021-11-08 RX ADMIN — Medication 1 MILLIGRAM(S): at 02:32

## 2021-11-08 RX ADMIN — Medication 1 TABLET(S): at 12:40

## 2021-11-08 RX ADMIN — Medication 2: at 08:46

## 2021-11-08 RX ADMIN — Medication 1 PACKET(S): at 06:29

## 2021-11-08 NOTE — PROGRESS NOTE ADULT - PROBLEM SELECTOR PLAN 2
alcohol abuser  last drink 1 day PTA  current CIWA 0  on ativan taper and PRN 1q 4 currently- patient is compliant  Thiamine, folic acid, mv  IV fluids  encourage pO intake  social work consult in alcohol abuser  last drink 1 day PTA  current CIWA 0  on ativan taper and PRN 1mg q 4 currently  - Has not required any PRN ativan over last 24 hours.   Thiamine, folic acid, mv  IV fluids  encourage pO intake  social work consult in

## 2021-11-08 NOTE — PROGRESS NOTE ADULT - SUBJECTIVE AND OBJECTIVE BOX
PGY-1 Progress Note discussed with attending    PAGER #: [[phone]] TILL 5:00 PM  PLEASE CONTACT ON CALL TEAM:  - On Call Team (Please refer to Karen) FROM 5:00 PM - 8:30PM  - Nightfloat Team FROM 8:30 -7:30 AM    CHIEF COMPLAINT & BRIEF HOSPITAL COURSE:    INTERVAL HPI/OVERNIGHT EVENTS:       REVIEW OF SYSTEMS:  CONSTITUTIONAL: No fever, weight loss, or fatigue  RESPIRATORY: No cough, wheezing, chills or hemoptysis; No shortness of breath  CARDIOVASCULAR: No chest pain, palpitations, dizziness, or leg swelling  GASTROINTESTINAL: No abdominal pain. No nausea, vomiting, or hematemesis; No diarrhea or constipation. No melena or hematochezia.  GENITOURINARY: No dysuria or hematuria, urinary frequency  NEUROLOGICAL: No headaches, memory loss, loss of strength, numbness, or tremors  SKIN: No itching, burning, rashes, or lesions     Vital Signs Last 24 Hrs  T(C): 36.3 (08 Nov 2021 07:06), Max: 36.7 (07 Nov 2021 15:37)  T(F): 97.4 (08 Nov 2021 07:06), Max: 98 (07 Nov 2021 15:37)  HR: 90 (08 Nov 2021 07:06) (80 - 90)  BP: 112/76 (08 Nov 2021 07:06) (110/65 - 138/77)  BP(mean): --  RR: 19 (08 Nov 2021 07:06) (17 - 19)  SpO2: 94% (08 Nov 2021 07:06) (94% - 99%)    PHYSICAL EXAMINATION:  GENERAL: NAD, well built  HEAD:  Atraumatic, Normocephalic  EYES:  conjunctiva and sclera clear  NECK: Supple, No JVD, Normal thyroid  CHEST/LUNG: Clear to auscultation. Clear to percussion bilaterally; No rales, rhonchi, wheezing, or rubs  HEART: Regular rate and rhythm; No murmurs, rubs, or gallops  ABDOMEN: Soft, Nontender, Nondistended; Bowel sounds present  NERVOUS SYSTEM:  Alert & Oriented X3,    EXTREMITIES:  2+ Peripheral Pulses, No clubbing, cyanosis, or edema  SKIN: warm dry                          12.3   5.48  )-----------( 133      ( 08 Nov 2021 06:50 )             36.3     11-08    136  |  104  |  11  ----------------------------<  173<H>  4.0   |  22  |  0.63    Ca    9.4      08 Nov 2021 06:50  Phos  3.1     11-08  Mg     2.0     11-08    TPro  7.9  /  Alb  3.0<L>  /  TBili  0.9  /  DBili  x   /  AST  55<H>  /  ALT  49  /  AlkPhos  102  11-08    LIVER FUNCTIONS - ( 08 Nov 2021 06:50 )  Alb: 3.0 g/dL / Pro: 7.9 g/dL / ALK PHOS: 102 U/L / ALT: 49 U/L DA / AST: 55 U/L / GGT: x                   CAPILLARY BLOOD GLUCOSE      RADIOLOGY & ADDITIONAL TESTS:                   PGY-1 Progress Note discussed with attending    PAGER #: [[phone]] TILL 5:00 PM  PLEASE CONTACT ON CALL TEAM:  - On Call Team (Please refer to Karen) FROM 5:00 PM - 8:30PM  - Nightfloat Team FROM 8:30 -7:30 AM    CHIEF COMPLAINT & BRIEF HOSPITAL COURSE: HPI:  Pt is a 65 y/o M with PMH of DM, HTn, alcohol abuser, who presented to the hospital with complains of nausea, vomiting, and abdominal pain. Pt states that he had been on a binge drink for the past 3 days drinkin "6 1/4ths of vodka per day", last drink 1 day PTA. He says that his pain was "50/10" before he came to the hospital, but improved in the hospital. He had nausea and vomiting, but unable to say how many times he vomited. He endorses that he didn't eat or drink anything else for 3 days. Pt denies any other complains including, headache, dizziness, chest pain, palpitations, diarrhea, constipation, urinary sxs, numbness or weakness.  (05 Nov 2021 09:50)      INTERVAL HPI/OVERNIGHT EVENTS: [place]: [id number]  Patient was examined while he was lying in bed, Aox3, responding appropriately to questions, in NAD. He has normal bowel and bladder movements, and denies any other acute complaints. Pt denied any chest pain, shortness of breath, nausea, vomiting or abdominal pain.       REVIEW OF SYSTEMS:  CONSTITUTIONAL: No fever, weight loss, or fatigue  RESPIRATORY: No cough, wheezing, chills or hemoptysis; No shortness of breath  CARDIOVASCULAR: No chest pain, palpitations, dizziness, or leg swelling  GASTROINTESTINAL: No abdominal pain. No nausea, vomiting, or hematemesis; No diarrhea or constipation. No melena or hematochezia.  GENITOURINARY: No dysuria or hematuria, urinary frequency  NEUROLOGICAL: No headaches, memory loss, loss of strength, numbness, or tremors  SKIN: No itching, burning, rashes, or lesions     Vital Signs Last 24 Hrs  T(C): 36.3 (08 Nov 2021 07:06), Max: 36.7 (07 Nov 2021 15:37)  T(F): 97.4 (08 Nov 2021 07:06), Max: 98 (07 Nov 2021 15:37)  HR: 90 (08 Nov 2021 07:06) (80 - 90)  BP: 112/76 (08 Nov 2021 07:06) (110/65 - 138/77)  BP(mean): --  RR: 19 (08 Nov 2021 07:06) (17 - 19)  SpO2: 94% (08 Nov 2021 07:06) (94% - 99%)    MEDICATIONS  (STANDING):  folic acid 1 milliGRAM(s) Oral daily  heparin   Injectable 5000 Unit(s) SubCutaneous every 8 hours  insulin lispro (ADMELOG) corrective regimen sliding scale   SubCutaneous three times a day before meals  insulin lispro (ADMELOG) corrective regimen sliding scale   SubCutaneous at bedtime  LORazepam   Injectable 0.5 milliGRAM(s) IV Push every 6 hours  multivitamin 1 Tablet(s) Oral daily    MEDICATIONS  (PRN):  LORazepam   Injectable 1 milliGRAM(s) IV Push every 2 hours PRN CIWA-Ar score 8 or greater  ondansetron Injectable 4 milliGRAM(s) IV Push once PRN Nausea and/or Vomiting      PHYSICAL EXAMINATION:  GENERAL: NAD, well built  HEAD:  Atraumatic, Normocephalic  EYES:  conjunctiva and sclera clear  NECK: Supple, No JVD,   CHEST/LUNG: Clear to auscultation. No rales, rhonchi, wheezing, or rubs  HEART: Regular rate and rhythm; No murmurs, rubs, or gallops  ABDOMEN: Soft, Nontender, Nondistended; Bowel sounds present  NERVOUS SYSTEM:  Alert & Oriented X3,    EXTREMITIES:  2+ Peripheral Pulses, No clubbing, cyanosis, or edema  SKIN: warm dry                          12.3   5.48  )-----------( 133      ( 08 Nov 2021 06:50 )             36.3     11-08    136  |  104  |  11  ----------------------------<  173<H>  4.0   |  22  |  0.63    Ca    9.4      08 Nov 2021 06:50  Phos  3.1     11-08  Mg     2.0     11-08    TPro  7.9  /  Alb  3.0<L>  /  TBili  0.9  /  DBili  x   /  AST  55<H>  /  ALT  49  /  AlkPhos  102  11-08    LIVER FUNCTIONS - ( 08 Nov 2021 06:50 )  Alb: 3.0 g/dL / Pro: 7.9 g/dL / ALK PHOS: 102 U/L / ALT: 49 U/L DA / AST: 55 U/L / GGT: x                   CAPILLARY BLOOD GLUCOSE      RADIOLOGY & ADDITIONAL TESTS:

## 2021-11-08 NOTE — PROGRESS NOTE ADULT - PROBLEM/PLAN-2
Per nursing home POA has declined yearly screenings for patient due to her recent health issues. She will not be having her yearly mammogram and follow-up with you.  
DISPLAY PLAN FREE TEXT
DISPLAY PLAN FREE TEXT

## 2021-11-08 NOTE — PROGRESS NOTE ADULT - NUTRITIONAL ASSESSMENT
Diet, Regular:   Consistent Carbohydrate {Evening Snacks}  DASH/TLC {Sodium & Cholesterol Restricted} (11-07-21 @ 11:20) [Active]

## 2021-11-08 NOTE — PROGRESS NOTE ADULT - PROBLEM SELECTOR PLAN 1
Pt with abd pain ,nausea and vomiting, chronic alcohol use and no food or water intake for 3 days  P/w AG of 30, started on Iv fluids, AG closed   Pt sxs improving  wbc 15, likely reactive to vomiting- resolved  CT abdomen negative  will continue IV fluids  Thiamine, folic acid, mv  started feeding, on full liquids, can advance tonight  monitor electrolytes including mag and phos, watch for refeeding syndrome  monitor bmp Pt with abd pain ,nausea and vomiting, chronic alcohol use and no food or water intake for 3 days  P/w AG of 30, started on Iv fluids, AG closed   Pt sxs improving  wbc 15, likely reactive to vomiting- resolved  CT abdomen negative  will continue IV fluids  Thiamine, folic acid, mv

## 2021-11-08 NOTE — SBIRT NOTE ADULT - NSSBIRTALCPOSREINDET_GEN_A_CORE
SW provided positive reinforcement regarding his current alcohol consumption. SW educated patient on the harms of excessive drinking. Patient declined substance abuse treatment referrals at this time.

## 2021-11-08 NOTE — PROGRESS NOTE ADULT - PROBLEM SELECTOR PLAN 5
pmh of htn, unclear of home meds  monitor bp, can be elevated in setting of withdrawal- controlled at 116/62  if continues to remain elevated can start medications pmh of htn, unclear of home meds  monitor bp, can be elevated in setting of withdrawal- controlled at 116/62  - Continue to monitor - If persistently elevated will start medication.

## 2021-11-08 NOTE — PROGRESS NOTE ADULT - ASSESSMENT
67 y/o male with a PMH of DM Type 2, HTN, EtOH abuse who presented w/ abdominal pain, nausea, vomiting, in the setting of binge drinking alcohol found to have anion gap metabolic acidosis with a lactate of 6.1 and VIVI. Alcoholic Ketoacidosis improved w/ IVF, LA improved on repeat. Continue Ativan taper, CIWA monitoring w/ prn Ativan, Thiamine, and Folic Acid for Etoh abuse/withdrawal prevention. VIVI resolved w/ IVF, c/w IVF x 1 more day and monitor Cr. Reactive leukocytosis w/o Abx, continue to monitor. Continue w/ SSI for DM, monitor FS, and f/up A1C. BP remains controlled off meds, will monitor and start on Amlodipine if becoming elevated. SW consulted for Etoh abuse. Phosphorous repleted for hypophosphatemia. 
Pt is a 67 y/o M with PMH of DM, HTn, alcohol abuser, who presented to the hospital with Alcohol withdrawal's

## 2021-11-08 NOTE — PROGRESS NOTE ADULT - PROBLEM SELECTOR PLAN 3
pt with Cr of 2.8 on admission, baseline 0.8  likeley due to poor po intake  improving after IV fluids  continue IV fliuids  no utility for urine studies as pt already got fluids  continue to monitor bmp pt with Cr of 2.8 on admission, baseline 0.8  likeley due to poor po intake  improving after IV fluids  continue IV fliuids  no utility for urine studies as pt already got fluids  continue to monitor bmp  - Resolved.

## 2021-11-08 NOTE — PROGRESS NOTE ADULT - ATTENDING COMMENTS
67 y/o male with a PMH of DM Type 2, HTN, EtOH abuse who presented w/ abdominal pain, nausea, vomiting, in the setting of binge drinking alcohol found to have anion gap metabolic acidosis with a lactate of 6.1 and VIVI. Alcoholic Ketoacidosis improved w/ IVF, LA improved on repeat. Continue Ativan taper, CIWA monitoring w/ prn Ativan, Thiamine, and Folic Acid for Etoh abuse/withdrawal prevention. VIVI resolved w/ IVF, will DC IVF and continue to monitor Cr. Reactive leukocytosis resolved w/o Abx. Continue w/ SSI for DM, monitor FS, A1C is 7.7. BP remains controlled off meds, will monitor and start on Amlodipine if becoming elevated. SW consulted for Etoh abuse. Phosphorous repleted for hypophosphatemia. Advance diet to low fat. Possible discharge in 2-3 days pending clinical improvement.
65 y/o male with a PMH of DM Type 2, HTN, EtOH abuse who presented w/ abdominal pain, nausea, vomiting, in the setting of binge drinking alcohol found to have anion gap metabolic acidosis with a lactate of 6.1 and VIVI due to Alcoholic Ketoacidosis. Improved w/ IVF. Withdrawals is well controlled, will continue Ativan taper x 1 more day, continue CIWA monitoring w/ prn Ativan, Thiamine, and Folic Acid. VIVI resolved w/ IVF. Reactive leukocytosis resolved w/o Abx. Continue w/ SSI for DM, monitor FS, A1C is 7.7. BP remains controlled off meds. SW consulted for Etoh abuse. Can likely discharge tomorrow once off standing ativan if still not requiring prn ativan.

## 2021-11-08 NOTE — PROGRESS NOTE ADULT - PROBLEM SELECTOR PLAN 4
pmh of DM on insulin, and other medications at home, doesn't recall name or dosages  pt says he isn't taking anything because of no money  will start on insulin moderate sliding scale   hgb a1c 7.7  monitor bs and adjust as needed so far controlled on ISS alone  will need vivo meds upon discharge

## 2021-11-09 VITALS
HEART RATE: 85 BPM | TEMPERATURE: 98 F | RESPIRATION RATE: 17 BRPM | OXYGEN SATURATION: 96 % | DIASTOLIC BLOOD PRESSURE: 70 MMHG | SYSTOLIC BLOOD PRESSURE: 120 MMHG

## 2021-11-09 LAB
ALBUMIN SERPL ELPH-MCNC: 3.1 G/DL — LOW (ref 3.5–5)
ALP SERPL-CCNC: 113 U/L — SIGNIFICANT CHANGE UP (ref 40–120)
ALT FLD-CCNC: 74 U/L DA — HIGH (ref 10–60)
ANION GAP SERPL CALC-SCNC: 10 MMOL/L — SIGNIFICANT CHANGE UP (ref 5–17)
AST SERPL-CCNC: 83 U/L — HIGH (ref 10–40)
BASOPHILS # BLD AUTO: 0.02 K/UL — SIGNIFICANT CHANGE UP (ref 0–0.2)
BASOPHILS NFR BLD AUTO: 0.3 % — SIGNIFICANT CHANGE UP (ref 0–2)
BILIRUB SERPL-MCNC: 0.9 MG/DL — SIGNIFICANT CHANGE UP (ref 0.2–1.2)
BUN SERPL-MCNC: 16 MG/DL — SIGNIFICANT CHANGE UP (ref 7–18)
CALCIUM SERPL-MCNC: 9.6 MG/DL — SIGNIFICANT CHANGE UP (ref 8.4–10.5)
CHLORIDE SERPL-SCNC: 102 MMOL/L — SIGNIFICANT CHANGE UP (ref 96–108)
CO2 SERPL-SCNC: 23 MMOL/L — SIGNIFICANT CHANGE UP (ref 22–31)
CREAT SERPL-MCNC: 0.7 MG/DL — SIGNIFICANT CHANGE UP (ref 0.5–1.3)
EOSINOPHIL # BLD AUTO: 0.26 K/UL — SIGNIFICANT CHANGE UP (ref 0–0.5)
EOSINOPHIL NFR BLD AUTO: 4.5 % — SIGNIFICANT CHANGE UP (ref 0–6)
GLUCOSE BLDC GLUCOMTR-MCNC: 165 MG/DL — HIGH (ref 70–99)
GLUCOSE BLDC GLUCOMTR-MCNC: 273 MG/DL — HIGH (ref 70–99)
GLUCOSE SERPL-MCNC: 183 MG/DL — HIGH (ref 70–99)
HCT VFR BLD CALC: 36.4 % — LOW (ref 39–50)
HGB BLD-MCNC: 12.6 G/DL — LOW (ref 13–17)
IMM GRANULOCYTES NFR BLD AUTO: 0.3 % — SIGNIFICANT CHANGE UP (ref 0–1.5)
LYMPHOCYTES # BLD AUTO: 2.52 K/UL — SIGNIFICANT CHANGE UP (ref 1–3.3)
LYMPHOCYTES # BLD AUTO: 43.8 % — SIGNIFICANT CHANGE UP (ref 13–44)
MAGNESIUM SERPL-MCNC: 2 MG/DL — SIGNIFICANT CHANGE UP (ref 1.6–2.6)
MCHC RBC-ENTMCNC: 32.5 PG — SIGNIFICANT CHANGE UP (ref 27–34)
MCHC RBC-ENTMCNC: 34.6 GM/DL — SIGNIFICANT CHANGE UP (ref 32–36)
MCV RBC AUTO: 93.8 FL — SIGNIFICANT CHANGE UP (ref 80–100)
MONOCYTES # BLD AUTO: 0.57 K/UL — SIGNIFICANT CHANGE UP (ref 0–0.9)
MONOCYTES NFR BLD AUTO: 9.9 % — SIGNIFICANT CHANGE UP (ref 2–14)
NEUTROPHILS # BLD AUTO: 2.37 K/UL — SIGNIFICANT CHANGE UP (ref 1.8–7.4)
NEUTROPHILS NFR BLD AUTO: 41.2 % — LOW (ref 43–77)
NRBC # BLD: 0 /100 WBCS — SIGNIFICANT CHANGE UP (ref 0–0)
PHOSPHATE SERPL-MCNC: 3.4 MG/DL — SIGNIFICANT CHANGE UP (ref 2.5–4.5)
PLATELET # BLD AUTO: 152 K/UL — SIGNIFICANT CHANGE UP (ref 150–400)
POTASSIUM SERPL-MCNC: 4.2 MMOL/L — SIGNIFICANT CHANGE UP (ref 3.5–5.3)
POTASSIUM SERPL-SCNC: 4.2 MMOL/L — SIGNIFICANT CHANGE UP (ref 3.5–5.3)
PROT SERPL-MCNC: 8.6 G/DL — HIGH (ref 6–8.3)
RBC # BLD: 3.88 M/UL — LOW (ref 4.2–5.8)
RBC # FLD: 13 % — SIGNIFICANT CHANGE UP (ref 10.3–14.5)
SODIUM SERPL-SCNC: 135 MMOL/L — SIGNIFICANT CHANGE UP (ref 135–145)
WBC # BLD: 5.76 K/UL — SIGNIFICANT CHANGE UP (ref 3.8–10.5)
WBC # FLD AUTO: 5.76 K/UL — SIGNIFICANT CHANGE UP (ref 3.8–10.5)

## 2021-11-09 PROCEDURE — 99239 HOSP IP/OBS DSCHRG MGMT >30: CPT | Mod: GC

## 2021-11-09 RX ADMIN — Medication 1 TABLET(S): at 12:32

## 2021-11-09 RX ADMIN — Medication 2: at 08:59

## 2021-11-09 RX ADMIN — Medication 1 MILLIGRAM(S): at 12:32

## 2021-11-09 RX ADMIN — Medication 0.5 MILLIGRAM(S): at 00:05

## 2021-11-09 RX ADMIN — HEPARIN SODIUM 5000 UNIT(S): 5000 INJECTION INTRAVENOUS; SUBCUTANEOUS at 05:45

## 2021-11-09 RX ADMIN — Medication 6: at 12:32

## 2021-11-09 NOTE — DISCHARGE NOTE PROVIDER - CARE PROVIDER_API CALL
Britta Dawkins)  Internal Medicine  [address], [address]rd [place] [address]  Phone: [phone]  Fax: [phone]  Follow Up Time:

## 2021-11-09 NOTE — DISCHARGE NOTE PROVIDER - NSDCCPCAREPLAN_GEN_ALL_CORE_FT
PRINCIPAL DISCHARGE DIAGNOSIS  Diagnosis: Alcohol abuse with withdrawal  Assessment and Plan of Treatment: You came into the hospital after having multiple drinks that caused you to start vomiting. We started you on fluids with thiamine, folic acid and multivitamin. We continued to mointor your withdrawl symptoms and gave you medication called Ativan which helps prevent withdrawl symptoms. We cut it down every day and you did not have any withdrawl symptoms and did not require any aditional medication. Please follow up with your primary care doctor within 2 weeks of discharge.        SECONDARY DISCHARGE DIAGNOSES  Diagnosis: HTN (hypertension)  Assessment and Plan of Treatment: You have high blood pressure. Please continue to taking your medications as prescribed. Please measure your blood pressure at least once daily. Maintain a healthy diet which includes incorporating more vegetables and decreasing the amount of salt (low sodium) and sugar in your diet such as rice, bread, and pasta low sugar, low fat, low sodium diet. Exercise frequently if possible.  Please follow up with your primary care provider within one week of your discharge.      Diagnosis: Diabetes mellitus  Assessment and Plan of Treatment: Continue with your blood sugar medication. HbA1C on admission was 7.7. This is a reflection of your blood sugar level over the last 3 months.  Please check your blood sugar levels twice daily - Morning/Afternoon, and Lunch/Bedtime the following day. Keep a record of your blood sugar readings  You must maintain a healthy diet that consists of low sugar and low fat. Your diet must consist of mostly vegetables. Please limit your intake of high sugar and high carbohydrate foods such as pasta, rice, and bread. Exercise frequently if possible. Follow up with primary care physician within one week of your discharge to further manage your diabetes and monitor your Hemoglobin A1c levels after 3 months to evaluate your diabetes control.

## 2021-11-09 NOTE — DISCHARGE NOTE PROVIDER - ATTENDING COMMENTS
67 y/o male with a PMH of DM Type 2, HTN, EtOH abuse who presented w/ abdominal pain, nausea, vomiting, in the setting of binge drinking alcohol found to have anion gap metabolic acidosis with a lactate of 6.1 and VIIV due to Alcoholic Ketoacidosis. Improved w/ IVF. Monitored on CIWA and ativan taper. Given Thiamine, and Folic Acid. VIVI resolved w/ IVF. Reactive leukocytosis resolved w/o Abx. A1C is 7.7. BP remains controlled off meds. Not requiring PRN ativan or standing ativan and stable for discharge.

## 2021-11-09 NOTE — DISCHARGE NOTE NURSING/CASE MANAGEMENT/SOCIAL WORK - PATIENT PORTAL LINK FT
You can access the FollowMyHealth Patient Portal offered by [facility] by registering at the following website: [email or web address]. By joining [employer]’s FollowMyHealth portal, you will also be able to view your health information using other applications (apps) compatible with our system.

## 2021-11-09 NOTE — DISCHARGE NOTE PROVIDER - HOSPITAL COURSE
Pt is a 65 y/o M with PMH of DM, HTn, alcohol abuser, who presented to the hospital with complains of nausea, vomiting, and abdominal pain. Pt states that he had been on a binge drink for the past 3 days drinkin "6 1/4ths of vodka per day", last drink 1 day PTA. He says that his pain was "50/10" before he came to the hospital, but improved in the hospital. He had nausea and vomiting, but unable to say how many times he vomited. He endorses that he didn't eat or drink anything else for 3 days. Pt denies any other complains including, headache, dizziness, chest pain, palpitations, diarrhea, constipation, urinary sxs, numbness or weakness. In the ED: Low grade fever 99.2,  BP stable 144s O2 sats 98% on RA  Exam: -ve CIWA 4 Labs: wbc 13 Cl 92, Na normal, Ketoacidosis, lactic acidoisis 6.4 (acetone, bicarb 15, gap 30), HLD, BAL <3 lipase -ve CT ab - heaptic steatosis, right hepatic lobe nodule (calcified) Pt was tapered off the ativan and did not require any of the standing ativan. He continued to tolerate his diet. His alcoholic ketoacidosis with AG was closed using IVF. he was supplemented with Thiamine, Folic acid and multivitamins. Patient is stable for discharge. Patient has been advised to follow up as outpatient. Case has been discussed with the attending. This is just a summary of the case. For further information, please refer to patient chart document.

## 2022-01-06 PROCEDURE — 36415 COLL VENOUS BLD VENIPUNCTURE: CPT

## 2022-01-06 PROCEDURE — 93005 ELECTROCARDIOGRAM TRACING: CPT

## 2022-01-06 PROCEDURE — 84100 ASSAY OF PHOSPHORUS: CPT

## 2022-01-06 PROCEDURE — 82803 BLOOD GASES ANY COMBINATION: CPT

## 2022-01-06 PROCEDURE — 83930 ASSAY OF BLOOD OSMOLALITY: CPT

## 2022-01-06 PROCEDURE — 83735 ASSAY OF MAGNESIUM: CPT

## 2022-01-06 PROCEDURE — 82962 GLUCOSE BLOOD TEST: CPT

## 2022-01-06 PROCEDURE — 81001 URINALYSIS AUTO W/SCOPE: CPT

## 2022-01-06 PROCEDURE — 83605 ASSAY OF LACTIC ACID: CPT

## 2022-01-06 PROCEDURE — 99285 EMERGENCY DEPT VISIT HI MDM: CPT

## 2022-01-06 PROCEDURE — 82009 KETONE BODYS QUAL: CPT

## 2022-01-06 PROCEDURE — 87635 SARS-COV-2 COVID-19 AMP PRB: CPT

## 2022-01-06 PROCEDURE — 96375 TX/PRO/DX INJ NEW DRUG ADDON: CPT

## 2022-01-06 PROCEDURE — 83036 HEMOGLOBIN GLYCOSYLATED A1C: CPT

## 2022-01-06 PROCEDURE — 84484 ASSAY OF TROPONIN QUANT: CPT

## 2022-01-06 PROCEDURE — 71045 X-RAY EXAM CHEST 1 VIEW: CPT

## 2022-01-06 PROCEDURE — 80061 LIPID PANEL: CPT

## 2022-01-06 PROCEDURE — 86769 SARS-COV-2 COVID-19 ANTIBODY: CPT

## 2022-01-06 PROCEDURE — 85025 COMPLETE CBC W/AUTO DIFF WBC: CPT

## 2022-01-06 PROCEDURE — 80053 COMPREHEN METABOLIC PANEL: CPT

## 2022-01-06 PROCEDURE — 80307 DRUG TEST PRSMV CHEM ANLYZR: CPT

## 2022-01-06 PROCEDURE — 74176 CT ABD & PELVIS W/O CONTRAST: CPT | Mod: MA

## 2022-01-06 PROCEDURE — 83690 ASSAY OF LIPASE: CPT

## 2022-01-06 PROCEDURE — 96374 THER/PROPH/DIAG INJ IV PUSH: CPT

## 2022-01-06 PROCEDURE — 85027 COMPLETE CBC AUTOMATED: CPT

## 2022-01-11 ENCOUNTER — INPATIENT (INPATIENT)
Facility: HOSPITAL | Age: 67
LOS: 3 days | Discharge: HOME | DRG: 872 | End: 2022-01-15
Attending: STUDENT IN AN ORGANIZED HEALTH CARE EDUCATION/TRAINING PROGRAM | Admitting: STUDENT IN AN ORGANIZED HEALTH CARE EDUCATION/TRAINING PROGRAM
Payer: MEDICAID

## 2022-01-11 VITALS
OXYGEN SATURATION: 95 % | SYSTOLIC BLOOD PRESSURE: 112 MMHG | HEIGHT: 63 IN | TEMPERATURE: 98 F | RESPIRATION RATE: 18 BRPM | HEART RATE: 119 BPM | DIASTOLIC BLOOD PRESSURE: 65 MMHG | WEIGHT: 156.53 LBS

## 2022-01-11 PROCEDURE — 99285 EMERGENCY DEPT VISIT HI MDM: CPT

## 2022-01-11 RX ORDER — SODIUM CHLORIDE 9 MG/ML
1000 INJECTION INTRAMUSCULAR; INTRAVENOUS; SUBCUTANEOUS ONCE
Refills: 0 | Status: COMPLETED | OUTPATIENT
Start: 2022-01-11 | End: 2022-01-11

## 2022-01-11 RX ORDER — SODIUM CHLORIDE 9 MG/ML
1000 INJECTION, SOLUTION INTRAVENOUS
Refills: 0 | Status: DISCONTINUED | OUTPATIENT
Start: 2022-01-11 | End: 2022-01-12

## 2022-01-11 RX ORDER — FAMOTIDINE 10 MG/ML
20 INJECTION INTRAVENOUS ONCE
Refills: 0 | Status: COMPLETED | OUTPATIENT
Start: 2022-01-11 | End: 2022-01-11

## 2022-01-11 RX ORDER — ONDANSETRON 8 MG/1
4 TABLET, FILM COATED ORAL ONCE
Refills: 0 | Status: COMPLETED | OUTPATIENT
Start: 2022-01-11 | End: 2022-01-12

## 2022-01-11 NOTE — ED PROVIDER NOTE - OBJECTIVE STATEMENT
Chief complaint of N/v and diffuse abdominal pain x 4 days. No fever, no chest pain, no shortness of breath. Pt states drinks alcohol daily, last consumed Tequila in am.

## 2022-01-11 NOTE — ED PROVIDER NOTE - CLINICAL SUMMARY MEDICAL DECISION MAKING FREE TEXT BOX
Pt resting, retching subsided after IVF an, Zofran and Ativan.   MAR and Dr. Leon endorsed. Pt agrees with admission for IV fluids, antiemetic and  withdrawal monitoring,   I had a detailed discussion with the patient and/or guardian regarding the historical points, exam findings, and any diagnostic results supporting the admit diagnosis.

## 2022-01-11 NOTE — ED PROVIDER NOTE - CARE PLAN
1 Principal Discharge DX:	Alcohol abuse  Secondary Diagnosis:	Nausea & vomiting  Secondary Diagnosis:	Lactic acidosis  Secondary Diagnosis:	Colitis

## 2022-01-11 NOTE — ED PROVIDER NOTE - PHYSICAL EXAMINATION
No icterus, no jaundice, no guarding to full palpation of abdomen. No icterus, no jaundice, no guarding to full palpation of abdomen.  CIWA score = 7 (frequent dry heaves, nausea, vomiting)

## 2022-01-12 DIAGNOSIS — F10.239 ALCOHOL DEPENDENCE WITH WITHDRAWAL, UNSPECIFIED: ICD-10-CM

## 2022-01-12 DIAGNOSIS — F10.10 ALCOHOL ABUSE, UNCOMPLICATED: ICD-10-CM

## 2022-01-12 DIAGNOSIS — Z29.9 ENCOUNTER FOR PROPHYLACTIC MEASURES, UNSPECIFIED: ICD-10-CM

## 2022-01-12 DIAGNOSIS — K52.9 NONINFECTIVE GASTROENTERITIS AND COLITIS, UNSPECIFIED: ICD-10-CM

## 2022-01-12 DIAGNOSIS — E87.2 ACIDOSIS: ICD-10-CM

## 2022-01-12 DIAGNOSIS — E11.9 TYPE 2 DIABETES MELLITUS WITHOUT COMPLICATIONS: ICD-10-CM

## 2022-01-12 DIAGNOSIS — N17.9 ACUTE KIDNEY FAILURE, UNSPECIFIED: ICD-10-CM

## 2022-01-12 DIAGNOSIS — Z02.9 ENCOUNTER FOR ADMINISTRATIVE EXAMINATIONS, UNSPECIFIED: ICD-10-CM

## 2022-01-12 DIAGNOSIS — A41.9 SEPSIS, UNSPECIFIED ORGANISM: ICD-10-CM

## 2022-01-12 LAB
A1C WITH ESTIMATED AVERAGE GLUCOSE RESULT: 6.5 % — HIGH (ref 4–5.6)
ACETONE SERPL-MCNC: NEGATIVE — SIGNIFICANT CHANGE UP
ALBUMIN SERPL ELPH-MCNC: 3.1 G/DL — LOW (ref 3.5–5)
ALBUMIN SERPL ELPH-MCNC: 4 G/DL — SIGNIFICANT CHANGE UP (ref 3.5–5)
ALP SERPL-CCNC: 117 U/L — SIGNIFICANT CHANGE UP (ref 40–120)
ALP SERPL-CCNC: 95 U/L — SIGNIFICANT CHANGE UP (ref 40–120)
ALT FLD-CCNC: 37 U/L DA — SIGNIFICANT CHANGE UP (ref 10–60)
ALT FLD-CCNC: 47 U/L DA — SIGNIFICANT CHANGE UP (ref 10–60)
ANION GAP SERPL CALC-SCNC: 15 MMOL/L — SIGNIFICANT CHANGE UP (ref 5–17)
ANION GAP SERPL CALC-SCNC: 27 MMOL/L — HIGH (ref 5–17)
AST SERPL-CCNC: 55 U/L — HIGH (ref 10–40)
AST SERPL-CCNC: 88 U/L — HIGH (ref 10–40)
B-OH-BUTYR SERPL-SCNC: 5.3 MMOL/L — HIGH
BASOPHILS # BLD AUTO: 0.02 K/UL — SIGNIFICANT CHANGE UP (ref 0–0.2)
BASOPHILS # BLD AUTO: 0.04 K/UL — SIGNIFICANT CHANGE UP (ref 0–0.2)
BASOPHILS NFR BLD AUTO: 0.2 % — SIGNIFICANT CHANGE UP (ref 0–2)
BASOPHILS NFR BLD AUTO: 0.3 % — SIGNIFICANT CHANGE UP (ref 0–2)
BILIRUB SERPL-MCNC: 0.6 MG/DL — SIGNIFICANT CHANGE UP (ref 0.2–1.2)
BILIRUB SERPL-MCNC: 0.6 MG/DL — SIGNIFICANT CHANGE UP (ref 0.2–1.2)
BUN SERPL-MCNC: 19 MG/DL — HIGH (ref 7–18)
BUN SERPL-MCNC: 25 MG/DL — HIGH (ref 7–18)
CALCIUM SERPL-MCNC: 7.8 MG/DL — LOW (ref 8.4–10.5)
CALCIUM SERPL-MCNC: 9.2 MG/DL — SIGNIFICANT CHANGE UP (ref 8.4–10.5)
CHLORIDE SERPL-SCNC: 102 MMOL/L — SIGNIFICANT CHANGE UP (ref 96–108)
CHLORIDE SERPL-SCNC: 110 MMOL/L — HIGH (ref 96–108)
CHOLEST SERPL-MCNC: 190 MG/DL — SIGNIFICANT CHANGE UP
CO2 SERPL-SCNC: 13 MMOL/L — LOW (ref 22–31)
CO2 SERPL-SCNC: 18 MMOL/L — LOW (ref 22–31)
CREAT SERPL-MCNC: 0.93 MG/DL — SIGNIFICANT CHANGE UP (ref 0.5–1.3)
CREAT SERPL-MCNC: 1.32 MG/DL — HIGH (ref 0.5–1.3)
EOSINOPHIL # BLD AUTO: 0 K/UL — SIGNIFICANT CHANGE UP (ref 0–0.5)
EOSINOPHIL # BLD AUTO: 0 K/UL — SIGNIFICANT CHANGE UP (ref 0–0.5)
EOSINOPHIL NFR BLD AUTO: 0 % — SIGNIFICANT CHANGE UP (ref 0–6)
EOSINOPHIL NFR BLD AUTO: 0 % — SIGNIFICANT CHANGE UP (ref 0–6)
ESTIMATED AVERAGE GLUCOSE: 140 MG/DL — HIGH (ref 68–114)
ETHANOL SERPL-MCNC: 229 MG/DL — HIGH (ref 0–10)
GLUCOSE BLDC GLUCOMTR-MCNC: 123 MG/DL — HIGH (ref 70–99)
GLUCOSE BLDC GLUCOMTR-MCNC: 131 MG/DL — HIGH (ref 70–99)
GLUCOSE BLDC GLUCOMTR-MCNC: 133 MG/DL — HIGH (ref 70–99)
GLUCOSE BLDC GLUCOMTR-MCNC: 166 MG/DL — HIGH (ref 70–99)
GLUCOSE SERPL-MCNC: 111 MG/DL — HIGH (ref 70–99)
GLUCOSE SERPL-MCNC: 146 MG/DL — HIGH (ref 70–99)
HCT VFR BLD CALC: 37.6 % — LOW (ref 39–50)
HCT VFR BLD CALC: 45.9 % — SIGNIFICANT CHANGE UP (ref 39–50)
HDLC SERPL-MCNC: 59 MG/DL — SIGNIFICANT CHANGE UP
HGB BLD-MCNC: 12.4 G/DL — LOW (ref 13–17)
HGB BLD-MCNC: 14.9 G/DL — SIGNIFICANT CHANGE UP (ref 13–17)
IMM GRANULOCYTES NFR BLD AUTO: 0.2 % — SIGNIFICANT CHANGE UP (ref 0–1.5)
IMM GRANULOCYTES NFR BLD AUTO: 0.4 % — SIGNIFICANT CHANGE UP (ref 0–1.5)
LACTATE SERPL-SCNC: 1.1 MMOL/L — SIGNIFICANT CHANGE UP (ref 0.7–2)
LACTATE SERPL-SCNC: 11.3 MMOL/L — CRITICAL HIGH (ref 0.7–2)
LACTATE SERPL-SCNC: 5.9 MMOL/L — CRITICAL HIGH (ref 0.7–2)
LIDOCAIN IGE QN: 22 U/L — LOW (ref 73–393)
LIPID PNL WITH DIRECT LDL SERPL: 108 MG/DL — HIGH
LYMPHOCYTES # BLD AUTO: 1.97 K/UL — SIGNIFICANT CHANGE UP (ref 1–3.3)
LYMPHOCYTES # BLD AUTO: 2.59 K/UL — SIGNIFICANT CHANGE UP (ref 1–3.3)
LYMPHOCYTES # BLD AUTO: 20.1 % — SIGNIFICANT CHANGE UP (ref 13–44)
LYMPHOCYTES # BLD AUTO: 20.9 % — SIGNIFICANT CHANGE UP (ref 13–44)
MAGNESIUM SERPL-MCNC: 1.9 MG/DL — SIGNIFICANT CHANGE UP (ref 1.6–2.6)
MCHC RBC-ENTMCNC: 32.3 PG — SIGNIFICANT CHANGE UP (ref 27–34)
MCHC RBC-ENTMCNC: 32.3 PG — SIGNIFICANT CHANGE UP (ref 27–34)
MCHC RBC-ENTMCNC: 32.5 GM/DL — SIGNIFICANT CHANGE UP (ref 32–36)
MCHC RBC-ENTMCNC: 33 GM/DL — SIGNIFICANT CHANGE UP (ref 32–36)
MCV RBC AUTO: 97.9 FL — SIGNIFICANT CHANGE UP (ref 80–100)
MCV RBC AUTO: 99.4 FL — SIGNIFICANT CHANGE UP (ref 80–100)
MONOCYTES # BLD AUTO: 0.53 K/UL — SIGNIFICANT CHANGE UP (ref 0–0.9)
MONOCYTES # BLD AUTO: 1.05 K/UL — HIGH (ref 0–0.9)
MONOCYTES NFR BLD AUTO: 10.7 % — SIGNIFICANT CHANGE UP (ref 2–14)
MONOCYTES NFR BLD AUTO: 4.3 % — SIGNIFICANT CHANGE UP (ref 2–14)
NEUTROPHILS # BLD AUTO: 6.74 K/UL — SIGNIFICANT CHANGE UP (ref 1.8–7.4)
NEUTROPHILS # BLD AUTO: 9.18 K/UL — HIGH (ref 1.8–7.4)
NEUTROPHILS NFR BLD AUTO: 68.8 % — SIGNIFICANT CHANGE UP (ref 43–77)
NEUTROPHILS NFR BLD AUTO: 74.1 % — SIGNIFICANT CHANGE UP (ref 43–77)
NON HDL CHOLESTEROL: 131 MG/DL — HIGH
NRBC # BLD: 0 /100 WBCS — SIGNIFICANT CHANGE UP (ref 0–0)
NRBC # BLD: 0 /100 WBCS — SIGNIFICANT CHANGE UP (ref 0–0)
PHOSPHATE SERPL-MCNC: 3.8 MG/DL — SIGNIFICANT CHANGE UP (ref 2.5–4.5)
PLATELET # BLD AUTO: 184 K/UL — SIGNIFICANT CHANGE UP (ref 150–400)
PLATELET # BLD AUTO: 255 K/UL — SIGNIFICANT CHANGE UP (ref 150–400)
POTASSIUM SERPL-MCNC: 4.4 MMOL/L — SIGNIFICANT CHANGE UP (ref 3.5–5.3)
POTASSIUM SERPL-MCNC: 5.3 MMOL/L — SIGNIFICANT CHANGE UP (ref 3.5–5.3)
POTASSIUM SERPL-SCNC: 4.4 MMOL/L — SIGNIFICANT CHANGE UP (ref 3.5–5.3)
POTASSIUM SERPL-SCNC: 5.3 MMOL/L — SIGNIFICANT CHANGE UP (ref 3.5–5.3)
PROT SERPL-MCNC: 7.5 G/DL — SIGNIFICANT CHANGE UP (ref 6–8.3)
PROT SERPL-MCNC: 9.3 G/DL — HIGH (ref 6–8.3)
RBC # BLD: 3.84 M/UL — LOW (ref 4.2–5.8)
RBC # BLD: 4.62 M/UL — SIGNIFICANT CHANGE UP (ref 4.2–5.8)
RBC # FLD: 13.3 % — SIGNIFICANT CHANGE UP (ref 10.3–14.5)
RBC # FLD: 13.5 % — SIGNIFICANT CHANGE UP (ref 10.3–14.5)
SARS-COV-2 RNA SPEC QL NAA+PROBE: SIGNIFICANT CHANGE UP
SODIUM SERPL-SCNC: 142 MMOL/L — SIGNIFICANT CHANGE UP (ref 135–145)
SODIUM SERPL-SCNC: 143 MMOL/L — SIGNIFICANT CHANGE UP (ref 135–145)
TRIGL SERPL-MCNC: 115 MG/DL — SIGNIFICANT CHANGE UP
TSH SERPL-MCNC: 0.88 UU/ML — SIGNIFICANT CHANGE UP (ref 0.34–4.82)
WBC # BLD: 12.39 K/UL — HIGH (ref 3.8–10.5)
WBC # BLD: 9.8 K/UL — SIGNIFICANT CHANGE UP (ref 3.8–10.5)
WBC # FLD AUTO: 12.39 K/UL — HIGH (ref 3.8–10.5)
WBC # FLD AUTO: 9.8 K/UL — SIGNIFICANT CHANGE UP (ref 3.8–10.5)

## 2022-01-12 PROCEDURE — 74177 CT ABD & PELVIS W/CONTRAST: CPT | Mod: 26,MA

## 2022-01-12 PROCEDURE — 99223 1ST HOSP IP/OBS HIGH 75: CPT

## 2022-01-12 PROCEDURE — 71045 X-RAY EXAM CHEST 1 VIEW: CPT | Mod: 26

## 2022-01-12 RX ORDER — THIAMINE MONONITRATE (VIT B1) 100 MG
500 TABLET ORAL
Refills: 0 | Status: DISCONTINUED | OUTPATIENT
Start: 2022-01-12 | End: 2022-01-12

## 2022-01-12 RX ORDER — PIPERACILLIN AND TAZOBACTAM 4; .5 G/20ML; G/20ML
3.38 INJECTION, POWDER, LYOPHILIZED, FOR SOLUTION INTRAVENOUS ONCE
Refills: 0 | Status: COMPLETED | OUTPATIENT
Start: 2022-01-12 | End: 2022-01-12

## 2022-01-12 RX ORDER — SODIUM CHLORIDE 9 MG/ML
1000 INJECTION INTRAMUSCULAR; INTRAVENOUS; SUBCUTANEOUS
Refills: 0 | Status: DISCONTINUED | OUTPATIENT
Start: 2022-01-12 | End: 2022-01-15

## 2022-01-12 RX ORDER — THIAMINE MONONITRATE (VIT B1) 100 MG
100 TABLET ORAL DAILY
Refills: 0 | Status: DISCONTINUED | OUTPATIENT
Start: 2022-01-12 | End: 2022-01-12

## 2022-01-12 RX ORDER — CEFTRIAXONE 500 MG/1
1000 INJECTION, POWDER, FOR SOLUTION INTRAMUSCULAR; INTRAVENOUS EVERY 24 HOURS
Refills: 0 | Status: DISCONTINUED | OUTPATIENT
Start: 2022-01-12 | End: 2022-01-12

## 2022-01-12 RX ORDER — ENOXAPARIN SODIUM 100 MG/ML
40 INJECTION SUBCUTANEOUS DAILY
Refills: 0 | Status: DISCONTINUED | OUTPATIENT
Start: 2022-01-12 | End: 2022-01-15

## 2022-01-12 RX ORDER — PREGABALIN 225 MG/1
1000 CAPSULE ORAL DAILY
Refills: 0 | Status: DISCONTINUED | OUTPATIENT
Start: 2022-01-12 | End: 2022-01-15

## 2022-01-12 RX ORDER — THIAMINE MONONITRATE (VIT B1) 100 MG
500 TABLET ORAL EVERY 8 HOURS
Refills: 0 | Status: DISCONTINUED | OUTPATIENT
Start: 2022-01-12 | End: 2022-01-13

## 2022-01-12 RX ORDER — FOLIC ACID 0.8 MG
1 TABLET ORAL DAILY
Refills: 0 | Status: DISCONTINUED | OUTPATIENT
Start: 2022-01-12 | End: 2022-01-15

## 2022-01-12 RX ORDER — METRONIDAZOLE 500 MG
500 TABLET ORAL EVERY 8 HOURS
Refills: 0 | Status: DISCONTINUED | OUTPATIENT
Start: 2022-01-12 | End: 2022-01-12

## 2022-01-12 RX ORDER — ONDANSETRON 8 MG/1
4 TABLET, FILM COATED ORAL EVERY 4 HOURS
Refills: 0 | Status: DISCONTINUED | OUTPATIENT
Start: 2022-01-12 | End: 2022-01-15

## 2022-01-12 RX ORDER — INSULIN LISPRO 100/ML
VIAL (ML) SUBCUTANEOUS
Refills: 0 | Status: DISCONTINUED | OUTPATIENT
Start: 2022-01-12 | End: 2022-01-15

## 2022-01-12 RX ORDER — THIAMINE MONONITRATE (VIT B1) 100 MG
500 TABLET ORAL EVERY 8 HOURS
Refills: 0 | Status: DISCONTINUED | OUTPATIENT
Start: 2022-01-12 | End: 2022-01-12

## 2022-01-12 RX ORDER — SODIUM CHLORIDE 9 MG/ML
1000 INJECTION INTRAMUSCULAR; INTRAVENOUS; SUBCUTANEOUS ONCE
Refills: 0 | Status: COMPLETED | OUTPATIENT
Start: 2022-01-12 | End: 2022-01-12

## 2022-01-12 RX ADMIN — Medication 2 MILLIGRAM(S): at 14:41

## 2022-01-12 RX ADMIN — Medication 2 MILLIGRAM(S): at 18:22

## 2022-01-12 RX ADMIN — Medication 2 MILLIGRAM(S): at 03:54

## 2022-01-12 RX ADMIN — Medication 2 MILLIGRAM(S): at 21:33

## 2022-01-12 RX ADMIN — PREGABALIN 1000 MICROGRAM(S): 225 CAPSULE ORAL at 11:46

## 2022-01-12 RX ADMIN — FAMOTIDINE 20 MILLIGRAM(S): 10 INJECTION INTRAVENOUS at 00:37

## 2022-01-12 RX ADMIN — Medication 2 MILLIGRAM(S): at 05:43

## 2022-01-12 RX ADMIN — Medication 105 MILLIGRAM(S): at 08:19

## 2022-01-12 RX ADMIN — CEFTRIAXONE 100 MILLIGRAM(S): 500 INJECTION, POWDER, FOR SOLUTION INTRAMUSCULAR; INTRAVENOUS at 04:54

## 2022-01-12 RX ADMIN — Medication 1 TABLET(S): at 08:20

## 2022-01-12 RX ADMIN — PIPERACILLIN AND TAZOBACTAM 200 GRAM(S): 4; .5 INJECTION, POWDER, LYOPHILIZED, FOR SOLUTION INTRAVENOUS at 02:29

## 2022-01-12 RX ADMIN — ENOXAPARIN SODIUM 40 MILLIGRAM(S): 100 INJECTION SUBCUTANEOUS at 11:46

## 2022-01-12 RX ADMIN — Medication 1: at 21:33

## 2022-01-12 RX ADMIN — Medication 500 MILLIGRAM(S): at 05:00

## 2022-01-12 RX ADMIN — SODIUM CHLORIDE 1000 MILLILITER(S): 9 INJECTION INTRAMUSCULAR; INTRAVENOUS; SUBCUTANEOUS at 02:19

## 2022-01-12 RX ADMIN — ONDANSETRON 4 MILLIGRAM(S): 8 TABLET, FILM COATED ORAL at 00:36

## 2022-01-12 RX ADMIN — Medication 105 MILLIGRAM(S): at 21:37

## 2022-01-12 RX ADMIN — Medication 1 MILLIGRAM(S): at 03:54

## 2022-01-12 RX ADMIN — SODIUM CHLORIDE 1000 MILLILITER(S): 9 INJECTION INTRAMUSCULAR; INTRAVENOUS; SUBCUTANEOUS at 00:37

## 2022-01-12 RX ADMIN — Medication 105 MILLIGRAM(S): at 16:00

## 2022-01-12 RX ADMIN — Medication 1 MILLIGRAM(S): at 00:36

## 2022-01-12 NOTE — ED ADULT NURSE NOTE - ED STAT RN HANDOFF DETAILS
Report given to RN Gregoria. Pt resting in bed, pt more aware, alert. No acute distress noted, denies chest pain, no shortness of breath indicated.

## 2022-01-12 NOTE — H&P ADULT - PROBLEM SELECTOR PLAN 5
Pt w/ SCr 1.5 on admission  Baseline SCr - 0.85-0.7  F/U Urine Lytes, calculate FeNa  IVF for now, follow BMP daily

## 2022-01-12 NOTE — H&P ADULT - NSHPPHYSICALEXAM_GEN_ALL_CORE
General - NAD, sitting up in bed, well groomed  Eyes - PERRLA, EOM intact  ENT - Nonicteric sclerae, PERRLA, EOMI. Oropharynx clear. Moist mucous membranes. Conjunctivae appear well perfused.   Neck - No noticeable or palpable swelling, redness or rash around throat or on face  Lymph Nodes - No lymphadenopathy  Cardiovascular - RRR no m/r/g, no JVD, no carotid bruits  Lungs - Clear to auscultation, no use of accessory muscles, no crackles or wheezes.  Skin - No rashes, skin warm and dry, no erythematous areas  Abdomen - Normal bowel sounds, abdomen soft and nontender  Rectal – Rectal exam not performed since no symptoms indicated blood loss.  Extremities - No edema, cyanosis or clubbing  Musculoskeletal - 5/5 strength, normal range of motion, no swollen or erythematous joints.  Neuro– asleep, CN 2-12 grossly intact.

## 2022-01-12 NOTE — DISCHARGE NOTE PROVIDER - ATTENDING DISCHARGE PHYSICAL EXAMINATION:
PE:   General: aox3, in no acute distress  CVS normal s1 and s2 w/o m/r/g  Lungs CTAB  Abdomen soft non distended abdomen w/o tenderness

## 2022-01-12 NOTE — PROGRESS NOTE ADULT - PROBLEM SELECTOR PLAN 1
Last drink 1/11 AM - CIWA 7 on admission, given 1mg ativan in ed   on admission  c/w CIWA Protocol  Ativan taper and 2mg q2 prn  Thiamine 500mg q8 x3 days, multivitamin, b12 qd, b9 qd  SW consult, SBIRT

## 2022-01-12 NOTE — H&P ADULT - HISTORY OF PRESENT ILLNESS
66M PMH DM, HTN, alcohol abuse, p/w nausea vomiting x4 days. Pt received ativan for a CIWA of 7 prior to examination. On examination, pt is asleep and arousable to touch but falls asleep easily. As such, history was taken from ED provider. Pt previously admitted to [facility] 11/2021 for high ag alcohol lactic acidosis. As per ED provider, pt with abdominal pain with nausea/vomiting x4 days. No fever, chest pain, sob. Pt drinks alcohol daily with last drink 1/11 AM.

## 2022-01-12 NOTE — PROGRESS NOTE ADULT - PROBLEM SELECTOR PLAN 8
from home  on CIWA, monitor Gi symptoms, lactate   SW consult from home  on CIWA, monitor Gi symptoms   SW consult

## 2022-01-12 NOTE — H&P ADULT - PROBLEM SELECTOR PLAN 3
AG 27 on admission likely lactic acidosis  obtain serum acetones as pt with etoh abuse to r/o alcohol ketoacidosis  aggressive fluid hydration d5 +ns at 200 p/w wbc >12, hr>90, elevated lactate, intra-abdominal source - although patient fulfills sepsis criteria, it may not be true sepsis due to superimposed alcohol withdrawal symptoms.  s/p 2L bolus in ED  on d5+ns   lipase neg  CT ap shows colitis  NPO for now, advance diet as tolerated  Start rocephin 1g qd, Flagyl 500 q8  zofran prn  replace electrolytes as needed p/w wbc >12, hr>90, elevated lactate, intra-abdominal source - although patient fulfills sepsis criteria, it may not be true sepsis due to superimposed alcohol withdrawal symptoms.  s/p 2L bolus in ED  on d5+ns   lipase neg  CT ap shows colitis  NPO for now, advance diet as tolerated  Stop antibiotics   zofran prn  replace electrolytes as needed

## 2022-01-12 NOTE — PATIENT PROFILE ADULT - FALL HARM RISK - HARM RISK INTERVENTIONS
Assistance with ambulation/Assistance OOB with selected safe patient handling equipment/Communicate Risk of Fall with Harm to all staff/Monitor for mental status changes/Monitor gait and stability/Reinforce activity limits and safety measures with patient and family/Tailored Fall Risk Interventions/Toileting schedule using arm’s reach rule for commode and bathroom/Use of alarms - bed, chair and/or voice tab/Visual Cue: Yellow wristband and red socks/Bed in lowest position, wheels locked, appropriate side rails in place/Call bell, personal items and telephone in reach/Instruct patient to call for assistance before getting out of bed or chair/Non-slip footwear when patient is out of bed/[place] to call system/Physically safe environment - no spills, clutter or unnecessary equipment/Purposeful Proactive Rounding/Room/bathroom lighting operational, light cord in reach

## 2022-01-12 NOTE — H&P ADULT - PROBLEM SELECTOR PLAN 6
on metformin at home as per previous dc papers  please confirm meds with pharmacy and patient  sliding scale  f/u a1c  fs q6 while npo

## 2022-01-12 NOTE — DISCHARGE NOTE PROVIDER - CARE PROVIDER_API CALL
[facility] medical doctor.,   Phone: (   )    -  Fax: (   )    -  Follow Up Time: 1-3 days

## 2022-01-12 NOTE — H&P ADULT - ATTENDING COMMENTS
Pt seen and examined.   Case discussed with MAR.  66 year old man with PMH of Dm2, HTN, chronic alcohol abuse here with about 5 days of nausea and non bloody emesis. No fever, appears to have abdominal discomfort on arrival but no diarrhea.   Similar presentation on past admission -     Still not able to give full history at this time    Vital Signs Last 24 Hrs  T(C): 36.5 (11 Jan 2022 23:10), Max: 36.5 (11 Jan 2022 23:10)  T(F): 97.7 (11 Jan 2022 23:10), Max: 97.7 (11 Jan 2022 23:10)  HR: 118 (12 Jan 2022 00:42) (118 - 119)  BP: 127/68 (12 Jan 2022 00:42) (112/65 - 127/68)  RR: 18 (12 Jan 2022 00:42) (18 - 18)  SpO2: 96% (12 Jan 2022 00:42) (95% - 96%)    Middle age man, lying in bed, significant tremors of both upper extremities and + tongue fasciculations.   Rousable, alert oriented to person and place  CTA B/L   Soft NT ND BS +    labs                         14.9   12.39 )-----------( 255      ( 12 Jan 2022 00:41 )             45.9     01-12    142  |  102  |  25<H>  ----------------------------<  111<H>  5.3   |  13<L>  |  1.32<H>    Ca    9.2      12 Jan 2022 00:41    TPro  9.3<H>  /  Alb  4.0  /  TBili  0.6  /  DBili  x   /  AST  88<H>  /  ALT  47  /  AlkPhos  117  01-12    CT abdomen   colitis on the ascending and transverse colon     Impression   - Acute alcohol withdrawal  - VIVI   - Chronic alcohol abuse   - Starvation ketosis with lactic acidosis  - Radiological colitis with no diarrhea      - Plan   1) Admit to Medicine   2) CIWA protocol   3) Thiamine 500mg Iv q 8 hourly X 3 days  4) IVF hydration with isotonic fluids with/ without dextrose.  5) Repeat chemistry and check Mg and Phos.   6) Subclinical colitis - hold antibiotics     Other PLANs as above

## 2022-01-12 NOTE — PROGRESS NOTE ADULT - ASSESSMENT
66M PMH DM, HTN, alcohol abuse, p/w nausea vomiting x4 days admitted for alcohol withdrawal, colitis, and sepsis. on IVF, CIWA protocol. on Ativan taper and prn.  66M PMH DM, HTN, alcohol abuse, p/w nausea vomiting x4 days admitted for alcohol withdrawal, colitis, and sepsis. CT shows Colitis, diverticulosis w/o inflammation, s/p Cipro/Flagyl, Started on IVF, CIWA protocol. on Ativan taper and prn.

## 2022-01-12 NOTE — ED ADULT NURSE NOTE - NSFALLRSKINDICATORS_ED_ALL_ED
After Visit Summary   5/22/2017    Wyatt LAWSON Keyshawn    MRN: [id number]           Patient Information     Date Of Birth          1964        Visit Information        Provider Department      5/22/2017 1:40 PM Shaun Bedolla MD [facility] [place]        Today's Diagnoses     Back muscle spasm    -  1    Poor appetite        Physical deconditioning           Follow-ups after your visit        Additional Services     [facility] PT, HAND, AND CHIROPRACTIC REFERRAL       **This order will print in the [facility] Scheduling Office**    Physical Therapy, Hand Therapy and Chiropractic Care are available through:    *[facility]  *[facility]  *[facility]    Call one number to schedule at any of the above locations: [phone].    Your provider has referred you to: Physical Therapy at [facility] or [facility]    Indication/Reason for Referral: back spasm  Onset of Illness: acute on chronic   Therapy Orders: Evaluate and Treat  Special Programs: None  Special Request: None    Kashif Rowland      Additional Comments for the Therapist or Chiropractor: none     Please be aware that coverage of these services is subject to the terms and limitations of your health insurance plan.  Call member services at your health plan with any benefit or coverage questions.      Please bring the following to your appointment:    *Your personal calendar for scheduling future appointments  *Comfortable clothing            MED THERAPY MANAGE REFERRAL       Your provider has referred you to: **[facility] Scheduling (numerous locations) [phone]   [email or web address]    Reason for Referral: uncontrolled diabetes    The [facility] department will contact you to schedule an appointment.  You may also schedule the appointment by calling [phone].  For [facility]   [place] patients,  please call [phone] to confirm/schedule your appointment on the next business day.    This service is designed to help you get the most from your medications.  A specially trained Pharmacist will work closely with you and your providers to solve any questions, concerns, issues or problems related to your medications.    Please bring all of your prescription and non-prescription medications (such as vitamins, over-the-counter medications, and herbals) or a detailed medication list to your appointment.    If you have a glucose meter or other home monitoring information, please also bring this to your appointment (i.e. blood glucose log, blood pressure log, pain log, etc.).                  Who to contact     If you have questions or need follow up information about today's clinic visit or your schedule please contact [facility] [place] directly at [phone].  Normal or non-critical lab and imaging results will be communicated to you by BoatSetterhart, letter or phone within 4 business days after the clinic has received the results. If you do not hear from us within 7 days, please contact the clinic through BoatSetterhart or phone. If you have a critical or abnormal lab result, we will notify you by phone as soon as possible.  Submit refill requests through Wanamaker or call your pharmacy and they will forward the refill request to us. Please allow 3 business days for your refill to be completed.          Additional Information About Your Visit        Wanamaker Information     Wanamaker gives you secure access to your electronic health record. If you see a primary care provider, you can also send messages to your care team and make appointments. If you have questions, please call your primary care clinic.  If you do not have a primary care provider, please call [phone] and they will assist you.        Care EveryWhere ID     This is your Care EveryWhere ID. This could be used by other organizations to access your  [place] medical records  [id number]        Your Vitals Were     Pulse Temperature Pulse Oximetry BMI (Body Mass Index)          105 97.2  F (36.2  C) (Tympanic) 99% 20.37 kg/m2         Blood Pressure from Last 3 Encounters:   05/22/17 138/84   05/09/17 112/68   04/11/17 152/82    Weight from Last 3 Encounters:   05/22/17 142 lb (64.4 kg)   05/09/17 137 lb (62.1 kg)   04/11/17 178 lb 5.6 oz (80.9 kg)              We Performed the Following     CBC with platelets     Comprehensive metabolic panel (BMP + Alb, Alk Phos, ALT, AST, Total. Bili, TP)     [facility] PT, HAND, AND CHIROPRACTIC REFERRAL     MED THERAPY MANAGE REFERRAL          Today's Medication Changes          These changes are accurate as of: 5/22/17  1:58 PM.  If you have any questions, ask your nurse or doctor.               Start taking these medicines.        Dose/Directions    mirtazapine 30 MG tablet   Commonly known as:  REMERON   Used for:  Poor appetite, Physical deconditioning   Started by:  Shaun Bedolla MD        Dose:  30 mg   Take 1 tablet (30 mg) by mouth At Bedtime   Quantity:  30 tablet   Refills:  1       traMADol 50 MG tablet   Commonly known as:  ULTRAM   Used for:  Back muscle spasm   Started by:  Shaun Bedolla MD        Dose:  50 mg   Take 1 tablet (50 mg) by mouth every 8 hours as needed for pain Maximum 3 tablet(s) per day   Quantity:  60 tablet   Refills:  0            Where to get your medicines      These medications were sent to [id number] - [place], [place] - [address] AT [facility] [place] [facility] [address]  [address], [place] [address]    Hours:  24-hours Phone:  [phone]     mirtazapine 30 MG tablet         Some of these will need a paper prescription and others can be bought over the counter.  Ask your nurse if you have questions.     Bring a paper prescription for each of these medications     traMADol 50 MG tablet                Primary Care Provider Office Phone # Fax #    Shaun Bedolla MD  [phone]       [facility] [address] [address]        Thank you!     Thank you for choosing [facility]  for your care. Our goal is always to provide you with excellent care. Hearing back from our patients is one way we can continue to improve our services. Please take a few minutes to complete the written survey that you may receive in the mail after your visit with us. Thank you!             Your Updated Medication List - Protect others around you: Learn how to safely use, store and throw away your medicines at www.disposemymeds.org.          This list is accurate as of: 5/22/17  1:58 PM.  Always use your most recent med list.                   Brand Name Dispense Instructions for use    albuterol (2.5 MG/3ML) 0.083% neb solution     360 mL    Take 1 vial (2.5 mg) by nebulization every 2 hours as needed for wheezing or shortness of breath / dyspnea       aspirin 81 MG tablet     100    1 tab per day       atorvastatin 40 MG tablet    LIPITOR    90 tablet    Take 1 tablet (40 mg) by mouth daily       bisacodyl 10 MG Suppository    DULCOLAX    30 suppository    Place 1 suppository (10 mg) rectally daily as needed for constipation       blood glucose monitoring lancets     3 Box    Use as direct       blood glucose monitoring meter device kit    no brand specified    1 kit    1 Device See Admin Instructions. per patient health plan       blood glucose monitoring test strip    ONE TOUCH ULTRA    3 Box    Use QID or as directed       ceFAZolin 1 GM vial    ANCEF    30 each    Inject 1 g into the vein every 8 hours       gabapentin 250 MG/5ML solution    NEURONTIN    450 mL    6 mLs (300 mg) by Oral or Feeding Tube route every 8 hours       hypromellose-dextran Soln ophthalmic solution      Apply 2-3 drops to eye every hour as needed for dry eyes       insulin glargine 100 UNIT/ML injection    LANTUS     Inject 18 Units Subcutaneous every morning  (before breakfast)       insulin pen needle 31G X 8 MM    B-D U/F    100 each    USE ONCE DAILY WITH LANTUS SOLOSTAR PEN       LORazepam 1 MG tablet    ATIVAN    30 tablet    Take 1 tablet (1 mg) by mouth every 8 hours as needed for anxiety       mirtazapine 30 MG tablet    REMERON    30 tablet    Take 1 tablet (30 mg) by mouth At Bedtime       traMADol 50 MG tablet    ULTRAM    60 tablet    Take 1 tablet (50 mg) by mouth every 8 hours as needed for pain Maximum 3 tablet(s) per day          yes

## 2022-01-12 NOTE — DISCHARGE NOTE PROVIDER - NSDCCPCAREPLAN_GEN_ALL_CORE_FT
PRINCIPAL DISCHARGE DIAGNOSIS  Diagnosis: Alcohol abuse  Assessment and Plan of Treatment: You were admitted for alcohol withdrawal symptoms and treated with medications to prevent seizures.   You have a history of alcohol dependence or alcoholism. With alcohol dependence, you drink alcohol too much and too often for a longer period of time. Dependence symptoms include needing more alcohol to get the same effects, having symptoms of withdrawal if you stop drinking, and may be unable to control your drinking. Alcohol abuse is dangerous to your liver and you may experience alcohol withdrawal if you stop drinking suddenly. Common symptoms of alcohol withdrawal include shaking, throwing up, sweating, and anxiety. Please follow up closely with your PCP to be properly monitored and treated. Please seek care immediately or call 911 if you have a convulsion, trouble breathing, chest pain, fast heartbeat, and feel like hurting yourself or someone else.        SECONDARY DISCHARGE DIAGNOSES  Diagnosis: Colitis  Assessment and Plan of Treatment: CT result shows Colitis, diverticulosis without inflammation. You received dose of antibiotics. Diarrhea/nausea/vomiting resolved.   HOME CARE INSTRUCTIONS  Get plenty of rest.  Drink enough water and fluids to keep your urine clear or pale yellow.  Eat a well-balanced diet.  Call your caregiver for follow-up as recommended.  SEEK IMMEDIATE MEDICAL CARE IF:  You develop chills.  You have an oral temperature above 101F, not controlled by medicine.  You have extreme weakness, fainting, or dehydration.  You have repeated vomiting.  You develop severe belly (abdominal) pain or are passing bloody or tarry stools      Diagnosis: Sepsis  Assessment and Plan of Treatment: You presented with symptoms of sepsis, now improving. Lactate noramalized. Follow up with your PCP regularly.    Diagnosis: VIVI (acute kidney injury)  Assessment and Plan of Treatment: Resolved. Maintain good oral hydration. Follow up with your PCP regularly.    Diagnosis: DM (diabetes mellitus)  Assessment and Plan of Treatment: HgA1C 7.7 from last admission.  Make sure you get your HgA1c checked every three months.  If you take oral diabetes medications, check your blood glucose two times a day.  If you take insulin, check your blood glucose before meals and at bedtime.  It's important not to skip any meals.  Keep a log of your blood glucose results and always take it with you to your doctor appointments.  Keep a list of your current medications including injectables and over the counter medications and bring this medication list with you to all your doctor appointments.  If you have not seen your ophthalmologist this year call for appointment.  Check your feet daily for redness, sores, or openings. Do not self treat. If no improvement in two days call your primary care physician for an appointment.  Low blood sugar (hypoglycemia) is a blood sugar below 70mg/dl. Check your blood sugar if you feel signs/symptoms of hypoglycemia. If your blood sugar is below 70 take 15 grams of carbohydrates (ex 4 oz of apple juice, 3-4 glucose tablets, or 4-6 oz of regular soda) wait 15 minutes and repeat blood sugar to make sure it comes up above 70.  If your blood sugar is above 70 and you are due for a meal, have a meal.  If you are not due for a meal have a snack.  This snack helps keeps your blood sugar at a safe range.  `     PRINCIPAL DISCHARGE DIAGNOSIS  Diagnosis: Alcohol abuse  Assessment and Plan of Treatment: You were admitted for alcohol withdrawal symptoms and treated with medications to prevent seizures. No symptoms of withdrawal seizure or agitation. Follow up with MD at [facility] as scheduled.   You have a history of alcohol dependence or alcoholism. With alcohol dependence, you drink alcohol too much and too often for a longer period of time. Dependence symptoms include needing more alcohol to get the same effects, having symptoms of withdrawal if you stop drinking, and may be unable to control your drinking. Alcohol abuse is dangerous to your liver and you may experience alcohol withdrawal if you stop drinking suddenly. Common symptoms of alcohol withdrawal include shaking, throwing up, sweating, and anxiety. Please follow up closely with your PCP to be properly monitored and treated. Please seek medical care immediately  if you have a convulsion, trouble breathing, chest pain, fast heartbeat, and feel like hurting yourself or someone else.        SECONDARY DISCHARGE DIAGNOSES  Diagnosis: Colitis  Assessment and Plan of Treatment: CT result shows Colitis, diverticulosis without inflammation. You received dose of antibiotics. Diarrhea/nausea/vomiting resolved. Tolerated well with advanced diet. Follow up with your doctor after discharge.   HOME CARE INSTRUCTIONS  Get plenty of rest.  Drink enough water and fluids to keep your urine clear or pale yellow.  Eat a well-balanced diet.  Call your caregiver for follow-up as recommended.  SEEK IMMEDIATE MEDICAL CARE IF:  You develop chills.  You have an oral temperature above 101F, not controlled by medicine.  You have extreme weakness, fainting, or dehydration.  You have repeated vomiting.  You develop severe belly (abdominal) pain or are passing bloody or tarry stools      Diagnosis: VIVI (acute kidney injury)  Assessment and Plan of Treatment: Resolved. Maintain good oral hydration. Follow up with your PCP regularly.    Diagnosis: DM (diabetes mellitus)  Assessment and Plan of Treatment: HgA1C 7.7 from last admission.  Make sure you get your HgA1c checked every three months.  If you take oral diabetes medications, check your blood glucose two times a day.  If you take insulin, check your blood glucose before meals and at bedtime.  It's important not to skip any meals.  Keep a log of your blood glucose results and always take it with you to your doctor appointments.  Keep a list of your current medications including injectables and over the counter medications and bring this medication list with you to all your doctor appointments.  If you have not seen your ophthalmologist this year call for appointment.  Check your feet daily for redness, sores, or openings. Do not self treat. If no improvement in two days call your primary care physician for an appointment.  Low blood sugar (hypoglycemia) is a blood sugar below 70mg/dl. Check your blood sugar if you feel signs/symptoms of hypoglycemia. If your blood sugar is below 70 take 15 grams of carbohydrates (ex 4 oz of apple juice, 3-4 glucose tablets, or 4-6 oz of regular soda) wait 15 minutes and repeat blood sugar to make sure it comes up above 70.  If your blood sugar is above 70 and you are due for a meal, have a meal.  If you are not due for a meal have a snack.  This snack helps keeps your blood sugar at a safe range.  `

## 2022-01-12 NOTE — DISCHARGE NOTE PROVIDER - HOSPITAL COURSE
66M PMH DM, HTN, alcohol abuse, p/w nausea vomiting x4 days admitted for alcohol withdrawal, colitis, and sepsis. CT resulted colitis, diverticulosis w/o inflammation. Started on IVF, S/p Cipro and flagyl. VALENTINA protocol. on Ativan taper and prn.     incomplete 1/12   66M PMH DM, HTN, alcohol abuse, p/w nausea vomiting x4 days admitted for alcohol withdrawal, colitis, and sepsis. CT resulted colitis, diverticulosis w/o inflammation. Started on IVF, S/p Cipro and flagyl. CIWA protocol. on Ativan taper and prn. Pt is well tolerated with advanced diet. No signs /symptoms of DT. No further report of Abd pain, N/V or diarrhea.   Patient is medically optimized for discharge home.  He states he follows doctors at [facility]. Next appointment made on 2/4/22.   Please refer to medical records/progress notes for in depth hospital course.      66M PMH DM, HTN, alcohol abuse, p/w nausea vomiting x4 days admitted for alcohol withdrawal, colitis, and sepsis. CT resulted colitis, diverticulosis w/o inflammation. Started on IVF, S/p Cipro and flagyl. CIWA protocol. on Ativan taper and prn. Pt is well tolerated with tapered dose of ativan and advanced diet. No signs /symptoms of DT. No further report of Abd pain, N/V or diarrhea.   Patient is medically optimized for discharge home.  VIVO meds ordered.  He states he follows doctors at [facility]. Next appointment made on 2/3/22.   Please refer to medical records/progress notes for in depth hospital course.

## 2022-01-12 NOTE — ED ADULT NURSE REASSESSMENT NOTE - NS ED NURSE REASSESS COMMENT FT1
Pt resting in bed, admitted to medicine service, awaiting floor bed. No acute distress noted, no shortness of breath indicated.

## 2022-01-12 NOTE — DISCHARGE NOTE PROVIDER - NSDCMRMEDTOKEN_GEN_ALL_CORE_FT
folic acid 1 mg oral tablet: 1 tab(s) orally once a day  metFORMIN 1000 mg oral tablet: 1 tab(s) orally 2 times a day  Multiple Vitamins with Minerals oral tablet: 1 tab(s) orally once a day  repaglinide 1 mg oral tablet: 1 tab(s) orally 3 times a day (before meals)   cyanocobalamin 1000 mcg oral tablet: 1 tab(s) orally once a day  folic acid 1 mg oral tablet: 1 tab(s) orally once a day  metFORMIN 1000 mg oral tablet: 1 tab(s) orally 2 times a day  Multiple Vitamins with Minerals oral tablet: 1 tab(s) orally once a day  repaglinide 1 mg oral tablet: 1 tab(s) orally 3 times a day (before meals)  thiamine 100 mg oral tablet: 1 tab(s) orally once a day

## 2022-01-12 NOTE — PROGRESS NOTE ADULT - PROBLEM SELECTOR PLAN 4
AG 27 on admission likely starvation lactic acidosis, unlikely DKA as pt normoglycemic and NOT on SGLT2 inhibitors  obtain serum acetones as pt with etoh abuse to r/o alcohol ketoacidosis  aggressive fluid hydration NS 150cc/hr x10hr  lactate 11.3--> 5.9  repeat lactate 12 pm AG 27 on admission likely starvation lactic acidosis, unlikely DKA as pt normoglycemic and NOT on SGLT2 inhibitors  obtain serum acetones as pt with etoh abuse to r/o alcohol ketoacidosis  aggressive fluid hydration NS 150cc/hr x10hr  AG normal now  lactate 11.3--> 5.9  repeat lactate 12 pm AG 27 on admission likely starvation lactic acidosis, unlikely DKA as pt normoglycemic and NOT on SGLT2 inhibitors  obtain serum acetones as pt with etoh abuse to r/o alcohol ketoacidosis  aggressive fluid hydration NS 150cc/hr x10hr  AG normal now  lactate 11.3--> 5.9  repeated lactate 12 pm--normal 1.1

## 2022-01-12 NOTE — DISCHARGE NOTE PROVIDER - NSDCPNSUBOBJ_GEN_ALL_CORE
Patient is a 66y old  Male who presents with a chief complaint of Colitits, EtOH Withrawal (14 Jan 2022 16:40)    Patient was seen and examined at bedside    used [id number]  patient denies any complains   CIWA 1      INTERVAL HPI/OVERNIGHT EVENTS:  T(C): 36.3 (01-15-22 @ 10:34), Max: 36.6 (01-14-22 @ 21:19)  HR: 90 (01-15-22 @ 10:34) (78 - 90)  BP: 155/87 (01-15-22 @ 10:34) (113/81 - 155/87)  RR: 17 (01-15-22 @ 10:34) (17 - 18)  SpO2: 96% (01-15-22 @ 10:34) (95% - 98%)  Wt(kg): --  I&O's Summary    14 Jan 2022 07:01  -  15 Evert 2022 07:00  --------------------------------------------------------  IN: 0 mL / OUT: 200 mL / NET: -200 mL        REVIEW OF SYSTEMS: denies fever, chills, SOB, palpitations, chest pain, abdominal pain, nausea, vomiting, diarrhea, constipation, dizziness    MEDICATIONS  (STANDING):  cyanocobalamin 1000 MICROGram(s) Oral daily  enoxaparin Injectable 40 milliGRAM(s) SubCutaneous daily  folic acid 1 milliGRAM(s) Oral daily  insulin lispro (ADMELOG) corrective regimen sliding scale   SubCutaneous Before meals and at bedtime  LORazepam     Tablet 1 milliGRAM(s) Oral every 12 hours  multivitamin 1 Tablet(s) Oral daily  sodium chloride 0.9%. 1000 milliLiter(s) (150 mL/Hr) IV Continuous <Continuous>  thiamine 100 milliGRAM(s) Oral daily    MEDICATIONS  (PRN):  LORazepam   Injectable 2 milliGRAM(s) IV Push every 2 hours PRN Symptom-triggered: 2 point increase in CIWA -Ar score and a total score of 7 or LESS  ondansetron Injectable 4 milliGRAM(s) IV Push every 4 hours PRN Nausea and/or Vomiting      PHYSICAL EXAM:  GENERAL: NAD, mildly tremulous   HEAD:  Atraumatic, Normocephalic  NERVOUS SYSTEM:  Alert & Oriented X3, Good concentration; Motor Strength 5/5 B/L upper and lower extremities  CHEST/LUNG: Clear to auscultation bilaterally; No rales, rhonchi, wheezing, or rubs  HEART: Regular rate and rhythm; No murmurs, rubs, or gallops  ABDOMEN: Soft, Nontender, Nondistended; Bowel sounds present  EXTREMITIES:  2+ Peripheral Pulses, No clubbing, cyanosis, or edema  LYMPH: No lymphadenopathy noted  SKIN: No rashes or lesions    LABS:                        13.0   4.27  )-----------( 148      ( 14 Jan 2022 08:07 )             37.0     01-14    136  |  103  |  9   ----------------------------<  141<H>  3.7   |  26  |  0.62    Ca    8.7      14 Jan 2022 08:07  Phos  2.8     01-14  Mg     2.1     01-14      CAPILLARY BLOOD GLUCOSE      POCT Blood Glucose.: 136 mg/dL (15 Evert 2022 07:57)  POCT Blood Glucose.: 127 mg/dL (14 Jan 2022 21:02)      A/P:  Acute alcohol withdrawal  Colitis resolved   DM  HTN    Plan:  Last Ativan was 1mg 6am this morning. Patient understands he has not finished his tapering treatment. Discussed about risks of alcohol withdrawal including DT, seizures and death. Understands but still wants to be discharged since he is missing work.   VIVO meds are at bedside  Discussed with patient about Aspiration, Fall & Seizure precautions.   patient has appointment with [facility] on Feb 3rd  Patient was discharged without AMA

## 2022-01-12 NOTE — H&P ADULT - PROBLEM SELECTOR PLAN 1
p/w wbc >12, hr>90, elevated lactate, intra-abdominal source  s/p 2L bolus in ED  on d5+ns   lipase neg  CT ap shows colitis  NPO for now, advance diet as tolerated  Start rocephin 1g qd, Flagyl 500 q8  zofran prn  replace electrolytes as needed Last drink 1/11 AM   on admission  Start CIWA Protocol  Ativan taper and 2q2 prn  PO Thiamine 500mg q8 x3 days, multivitamin, b12 qd, b9 qd  Pharmacy does not have IVPB THIAMINE.  SBIRT Last drink 1/11 AM - CIWA 7 on admission, given 1mg ativan in ed   on admission  Start CIWA Protocol  Ativan taper and 2q2 prn  Thiamine 500mg q8 x3 days, multivitamin, b12 qd, b9 qd  SBIRT

## 2022-01-12 NOTE — PROGRESS NOTE ADULT - PROBLEM SELECTOR PLAN 5
Pt w/ SCr 1.5 on admission  Baseline SCr - 0.85-0.7  F/U Urine Lytes   IVF for now  follow BMP daily Pt w/ SCr 1.5 on admission  Baseline SCr - 0.85-0.7  Cr now at baseline  s/p IVF   follow BMP daily

## 2022-01-12 NOTE — ED ADULT NURSE REASSESSMENT NOTE - NS ED NURSE REASSESS COMMENT FT1
Pt resting in bed, pt more aware, alert. No acute distress noted, denies chest pain, no shortness of breath indicated.

## 2022-01-12 NOTE — H&P ADULT - PROBLEM SELECTOR PLAN 2
Last drink 1/11 AM   on admission  Start CIWA Protocol  Ativan taper and 2q2 prn  PO Thiamine 500mg q8 x3 days, multivitamin, b12 qd, b9 qd  Pharmacy does not have IVPB THIAMINE.  SBIRT AG 27 on admission likely starvation lactic acidosis  obtain serum acetones as pt with etoh abuse to r/o alcohol ketoacidosis  aggressive fluid hydration NS 150cc/hr x10hr AG 27 on admission likely starvation lactic acidosis, unlikely DKA as pt normoglycemic and NOT on SGLT2 inhibitors  obtain serum acetones as pt with etoh abuse to r/o alcohol ketoacidosis  aggressive fluid hydration NS 150cc/hr x10hr

## 2022-01-12 NOTE — H&P ADULT - ASSESSMENT
66M PMH DM, HTN, alcohol abuse, p/w nausea vomiting x4 days admitted for alcohol withrawal, colitis, sepsis.

## 2022-01-13 LAB
ALBUMIN SERPL ELPH-MCNC: 3.2 G/DL — LOW (ref 3.5–5)
ALP SERPL-CCNC: 86 U/L — SIGNIFICANT CHANGE UP (ref 40–120)
ALT FLD-CCNC: 33 U/L DA — SIGNIFICANT CHANGE UP (ref 10–60)
ANION GAP SERPL CALC-SCNC: 6 MMOL/L — SIGNIFICANT CHANGE UP (ref 5–17)
AST SERPL-CCNC: 46 U/L — HIGH (ref 10–40)
BILIRUB SERPL-MCNC: 1.1 MG/DL — SIGNIFICANT CHANGE UP (ref 0.2–1.2)
BUN SERPL-MCNC: 17 MG/DL — SIGNIFICANT CHANGE UP (ref 7–18)
CALCIUM SERPL-MCNC: 8.5 MG/DL — SIGNIFICANT CHANGE UP (ref 8.4–10.5)
CHLORIDE SERPL-SCNC: 106 MMOL/L — SIGNIFICANT CHANGE UP (ref 96–108)
CO2 SERPL-SCNC: 28 MMOL/L — SIGNIFICANT CHANGE UP (ref 22–31)
CREAT ?TM UR-MCNC: 125 MG/DL — SIGNIFICANT CHANGE UP
CREAT SERPL-MCNC: 0.71 MG/DL — SIGNIFICANT CHANGE UP (ref 0.5–1.3)
GLUCOSE BLDC GLUCOMTR-MCNC: 137 MG/DL — HIGH (ref 70–99)
GLUCOSE BLDC GLUCOMTR-MCNC: 145 MG/DL — HIGH (ref 70–99)
GLUCOSE BLDC GLUCOMTR-MCNC: 147 MG/DL — HIGH (ref 70–99)
GLUCOSE BLDC GLUCOMTR-MCNC: 149 MG/DL — HIGH (ref 70–99)
GLUCOSE SERPL-MCNC: 137 MG/DL — HIGH (ref 70–99)
HCT VFR BLD CALC: 36.1 % — LOW (ref 39–50)
HGB BLD-MCNC: 12.3 G/DL — LOW (ref 13–17)
MAGNESIUM SERPL-MCNC: 2.9 MG/DL — HIGH (ref 1.6–2.6)
MCHC RBC-ENTMCNC: 32.9 PG — SIGNIFICANT CHANGE UP (ref 27–34)
MCHC RBC-ENTMCNC: 34.1 GM/DL — SIGNIFICANT CHANGE UP (ref 32–36)
MCV RBC AUTO: 96.5 FL — SIGNIFICANT CHANGE UP (ref 80–100)
NRBC # BLD: 0 /100 WBCS — SIGNIFICANT CHANGE UP (ref 0–0)
PHOSPHATE SERPL-MCNC: 1.4 MG/DL — LOW (ref 2.5–4.5)
PLATELET # BLD AUTO: 154 K/UL — SIGNIFICANT CHANGE UP (ref 150–400)
POTASSIUM SERPL-MCNC: 3.8 MMOL/L — SIGNIFICANT CHANGE UP (ref 3.5–5.3)
POTASSIUM SERPL-SCNC: 3.8 MMOL/L — SIGNIFICANT CHANGE UP (ref 3.5–5.3)
PROT SERPL-MCNC: 7.4 G/DL — SIGNIFICANT CHANGE UP (ref 6–8.3)
RBC # BLD: 3.74 M/UL — LOW (ref 4.2–5.8)
RBC # FLD: 13.3 % — SIGNIFICANT CHANGE UP (ref 10.3–14.5)
SODIUM SERPL-SCNC: 140 MMOL/L — SIGNIFICANT CHANGE UP (ref 135–145)
SODIUM UR-SCNC: 82 MMOL/L — SIGNIFICANT CHANGE UP
WBC # BLD: 4.86 K/UL — SIGNIFICANT CHANGE UP (ref 3.8–10.5)
WBC # FLD AUTO: 4.86 K/UL — SIGNIFICANT CHANGE UP (ref 3.8–10.5)

## 2022-01-13 PROCEDURE — 99233 SBSQ HOSP IP/OBS HIGH 50: CPT

## 2022-01-13 RX ORDER — POTASSIUM PHOSPHATE, MONOBASIC POTASSIUM PHOSPHATE, DIBASIC 236; 224 MG/ML; MG/ML
30 INJECTION, SOLUTION INTRAVENOUS ONCE
Refills: 0 | Status: COMPLETED | OUTPATIENT
Start: 2022-01-13 | End: 2022-01-13

## 2022-01-13 RX ORDER — THIAMINE MONONITRATE (VIT B1) 100 MG
100 TABLET ORAL DAILY
Refills: 0 | Status: DISCONTINUED | OUTPATIENT
Start: 2022-01-13 | End: 2022-01-15

## 2022-01-13 RX ADMIN — Medication 1 TABLET(S): at 13:49

## 2022-01-13 RX ADMIN — Medication 1 MILLIGRAM(S): at 13:49

## 2022-01-13 RX ADMIN — ENOXAPARIN SODIUM 40 MILLIGRAM(S): 100 INJECTION SUBCUTANEOUS at 13:49

## 2022-01-13 RX ADMIN — POTASSIUM PHOSPHATE, MONOBASIC POTASSIUM PHOSPHATE, DIBASIC 83.33 MILLIMOLE(S): 236; 224 INJECTION, SOLUTION INTRAVENOUS at 19:00

## 2022-01-13 RX ADMIN — Medication 105 MILLIGRAM(S): at 05:11

## 2022-01-13 RX ADMIN — Medication 1 MILLIGRAM(S): at 23:10

## 2022-01-13 RX ADMIN — PREGABALIN 1000 MICROGRAM(S): 225 CAPSULE ORAL at 13:49

## 2022-01-13 RX ADMIN — Medication 1 MILLIGRAM(S): at 17:14

## 2022-01-13 RX ADMIN — Medication 1.5 MILLIGRAM(S): at 06:27

## 2022-01-13 RX ADMIN — Medication 100 MILLIGRAM(S): at 17:14

## 2022-01-13 RX ADMIN — Medication 1.5 MILLIGRAM(S): at 02:27

## 2022-01-13 NOTE — PROGRESS NOTE ADULT - PROBLEM SELECTOR PLAN 1
c/w VALENTINA Protocol  Ativan taper to 1g Q6  c/w Thiamine 500mg q8 x3 days, multivitamin, b12 qd, b9 qd  SW consult, SBIRT

## 2022-01-13 NOTE — PROGRESS NOTE ADULT - PROBLEM SELECTOR PLAN 5
on metformin at home as per previous dc papers  last A1C 7.7 from Nov 2021  Hgb on this admission 6.5  c/w sliding scale ACHS

## 2022-01-13 NOTE — PROGRESS NOTE ADULT - ASSESSMENT
66M PMH DM, HTN, alcohol abuse, p/w nausea vomiting x4 days admitted for alcohol withdrawal, colitis, and sepsis. CT shows Colitis, diverticulosis w/o inflammation, s/p Cipro/Flagyl, Started on IVF, CIWA protocol. on Ativan taper and prn.

## 2022-01-14 LAB
ANION GAP SERPL CALC-SCNC: 7 MMOL/L — SIGNIFICANT CHANGE UP (ref 5–17)
BUN SERPL-MCNC: 9 MG/DL — SIGNIFICANT CHANGE UP (ref 7–18)
CALCIUM SERPL-MCNC: 8.7 MG/DL — SIGNIFICANT CHANGE UP (ref 8.4–10.5)
CHLORIDE SERPL-SCNC: 103 MMOL/L — SIGNIFICANT CHANGE UP (ref 96–108)
CO2 SERPL-SCNC: 26 MMOL/L — SIGNIFICANT CHANGE UP (ref 22–31)
CREAT SERPL-MCNC: 0.62 MG/DL — SIGNIFICANT CHANGE UP (ref 0.5–1.3)
GLUCOSE BLDC GLUCOMTR-MCNC: 127 MG/DL — HIGH (ref 70–99)
GLUCOSE BLDC GLUCOMTR-MCNC: 136 MG/DL — HIGH (ref 70–99)
GLUCOSE BLDC GLUCOMTR-MCNC: 142 MG/DL — HIGH (ref 70–99)
GLUCOSE BLDC GLUCOMTR-MCNC: 162 MG/DL — HIGH (ref 70–99)
GLUCOSE SERPL-MCNC: 141 MG/DL — HIGH (ref 70–99)
HCT VFR BLD CALC: 37 % — LOW (ref 39–50)
HGB BLD-MCNC: 13 G/DL — SIGNIFICANT CHANGE UP (ref 13–17)
MAGNESIUM SERPL-MCNC: 2.1 MG/DL — SIGNIFICANT CHANGE UP (ref 1.6–2.6)
MCHC RBC-ENTMCNC: 33.1 PG — SIGNIFICANT CHANGE UP (ref 27–34)
MCHC RBC-ENTMCNC: 35.1 GM/DL — SIGNIFICANT CHANGE UP (ref 32–36)
MCV RBC AUTO: 94.1 FL — SIGNIFICANT CHANGE UP (ref 80–100)
NRBC # BLD: 0 /100 WBCS — SIGNIFICANT CHANGE UP (ref 0–0)
PHOSPHATE SERPL-MCNC: 2.8 MG/DL — SIGNIFICANT CHANGE UP (ref 2.5–4.5)
PLATELET # BLD AUTO: 148 K/UL — LOW (ref 150–400)
POTASSIUM SERPL-MCNC: 3.7 MMOL/L — SIGNIFICANT CHANGE UP (ref 3.5–5.3)
POTASSIUM SERPL-SCNC: 3.7 MMOL/L — SIGNIFICANT CHANGE UP (ref 3.5–5.3)
RBC # BLD: 3.93 M/UL — LOW (ref 4.2–5.8)
RBC # FLD: 12.8 % — SIGNIFICANT CHANGE UP (ref 10.3–14.5)
SODIUM SERPL-SCNC: 136 MMOL/L — SIGNIFICANT CHANGE UP (ref 135–145)
WBC # BLD: 4.27 K/UL — SIGNIFICANT CHANGE UP (ref 3.8–10.5)
WBC # FLD AUTO: 4.27 K/UL — SIGNIFICANT CHANGE UP (ref 3.8–10.5)

## 2022-01-14 PROCEDURE — 99233 SBSQ HOSP IP/OBS HIGH 50: CPT

## 2022-01-14 RX ORDER — PREGABALIN 225 MG/1
1 CAPSULE ORAL
Qty: 14 | Refills: 0
Start: 2022-01-14 | End: 2022-01-27

## 2022-01-14 RX ORDER — MULTIVIT-MIN/FERROUS GLUCONATE 9 MG/15 ML
1 LIQUID (ML) ORAL
Qty: 30 | Refills: 0
Start: 2022-01-14 | End: 2022-02-12

## 2022-01-14 RX ORDER — REPAGLINIDE 1 MG/1
1 TABLET ORAL
Qty: 0 | Refills: 0 | DISCHARGE

## 2022-01-14 RX ORDER — FOLIC ACID 0.8 MG
1 TABLET ORAL
Qty: 30 | Refills: 0
Start: 2022-01-14 | End: 2022-02-12

## 2022-01-14 RX ORDER — THIAMINE MONONITRATE (VIT B1) 100 MG
1 TABLET ORAL
Qty: 14 | Refills: 0
Start: 2022-01-14 | End: 2022-01-27

## 2022-01-14 RX ORDER — METFORMIN HYDROCHLORIDE 850 MG/1
1 TABLET ORAL
Qty: 60 | Refills: 0
Start: 2022-01-14 | End: 2022-02-12

## 2022-01-14 RX ORDER — REPAGLINIDE 1 MG/1
1 TABLET ORAL
Qty: 90 | Refills: 0
Start: 2022-01-14 | End: 2022-02-12

## 2022-01-14 RX ORDER — FOLIC ACID 0.8 MG
1 TABLET ORAL
Qty: 0 | Refills: 0 | DISCHARGE

## 2022-01-14 RX ORDER — METFORMIN HYDROCHLORIDE 850 MG/1
1 TABLET ORAL
Qty: 0 | Refills: 0 | DISCHARGE

## 2022-01-14 RX ADMIN — Medication 100 MILLIGRAM(S): at 12:02

## 2022-01-14 RX ADMIN — Medication 1 MILLIGRAM(S): at 19:03

## 2022-01-14 RX ADMIN — Medication 1: at 17:04

## 2022-01-14 RX ADMIN — Medication 1 MILLIGRAM(S): at 06:28

## 2022-01-14 RX ADMIN — ENOXAPARIN SODIUM 40 MILLIGRAM(S): 100 INJECTION SUBCUTANEOUS at 12:02

## 2022-01-14 RX ADMIN — PREGABALIN 1000 MICROGRAM(S): 225 CAPSULE ORAL at 12:01

## 2022-01-14 RX ADMIN — Medication 1 TABLET(S): at 12:01

## 2022-01-14 RX ADMIN — Medication 1 MILLIGRAM(S): at 12:01

## 2022-01-14 RX ADMIN — Medication 1 MILLIGRAM(S): at 13:50

## 2022-01-14 NOTE — PROGRESS NOTE ADULT - PROBLEM SELECTOR PLAN 3
p/w wbc >12, hr>90, elevated lactate  likely intra-abdominal source - although patient fulfills sepsis criteria, it may not be true sepsis due to superimposed alcohol withdrawal symptoms.  CXR negative  s/p 2L bolus in ED  on d5+ns   lipase neg  s/p Cipro and flagyl  monitor lab and VS.   repeat lactate
resolved  p/w wbc >12, hr>90, elevated lactate  due to superimposed alcohol withdrawal symptoms.  CXR negative  s/p IVF   lactate ==1.1
resolved  p/w wbc >12, hr>90, elevated lactate  due to superimposed alcohol withdrawal symptoms.  CXR negative  s/p IVF   lactate ==1.1

## 2022-01-14 NOTE — PROGRESS NOTE ADULT - ASSESSMENT
66M PMH DM, HTN, alcohol abuse, p/w nausea vomiting x4 days admitted for alcohol withdrawal, colitis, and sepsis. CT shows Colitis, diverticulosis w/o inflammation, s/p Cipro/Flagyl, Started on IVF, CIWA protocol. on Ativan taper and prn.   1/14, pt is on Ativan taper 1mg q6h, changed to q8h today then q12, q24hr then d/c.    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 ordered VIVO meds. Pt states he follows MD at [facility] [place], next appt is 2/3/22.  66M PMH DM, HTN, alcohol abuse, p/w nausea vomiting x4 days admitted for alcohol withdrawal, colitis, and sepsis. CT shows Colitis, diverticulosis w/o inflammation, s/p Cipro/Flagyl, Started on IVF, CIWA protocol. on Ativan taper and prn.   1/14, pt is on Ativan taper 1mg q8h, changed to 12h today then dc.    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 ordered VIVO meds. Pt states he follows MD at [facility], next appt is 2/3/22.

## 2022-01-14 NOTE — PROGRESS NOTE ADULT - PROBLEM SELECTOR PLAN 1
c/w VALENTINA Protocol  Ativan taper to 1g Q6--> Q8 today  will taper Q12h tomorrow then Q24 then dc  c/w Thiamine 500mg q8 x3 days, multivitamin, b12 qd, b9 qd  SW consult, SBIRT  pt has no insurance, ordered VIVO meds c/w VALENTINA Protocol  Ativan taper to 1g Q6--> Q8 --> Q12h x 1 day then d/c  c/w Thiamine 500mg q8 x3 days, multivitamin, b12 qd, b9 qd  SW consult, SBIRT  pt has no insurance, ordered VIVO meds

## 2022-01-14 NOTE — PROGRESS NOTE ADULT - PROBLEM SELECTOR PLAN 7
from home  on CIWA, monitor Gi symptoms   SW consult  VIVo med ordered  d/c likely tomorrow, or sunday, Ativan tapered Q8h today. from home  on CIWA, monitor Gi symptoms   SW consult  VIVO med ordered  d/c likely tomorrow, or Sunday, Ativan tapered Q8h today. from home  on CIWA, monitor Gi symptoms   SW consult  VIVO med ordered  d/c likely tomorrow, or Sunday, Ativan tapered q12 h today then dc.

## 2022-01-14 NOTE — PROGRESS NOTE ADULT - SUBJECTIVE AND OBJECTIVE BOX
NP Note discussed with  Primary Attending   #[id number] (Naomi)     INTERVAL HPI/OVERNIGHT EVENTS: no new complaints    MEDICATIONS  (STANDING):  cyanocobalamin 1000 MICROGram(s) Oral daily  enoxaparin Injectable 40 milliGRAM(s) SubCutaneous daily  folic acid 1 milliGRAM(s) Oral daily  insulin lispro (ADMELOG) corrective regimen sliding scale   SubCutaneous Before meals and at bedtime  LORazepam     Tablet 1 milliGRAM(s) Oral every 8 hours  multivitamin 1 Tablet(s) Oral daily  sodium chloride 0.9%. 1000 milliLiter(s) (150 mL/Hr) IV Continuous <Continuous>  thiamine 100 milliGRAM(s) Oral daily    MEDICATIONS  (PRN):  LORazepam   Injectable 2 milliGRAM(s) IV Push every 2 hours PRN Symptom-triggered: 2 point increase in CIWA -Ar score and a total score of 7 or LESS  ondansetron Injectable 4 milliGRAM(s) IV Push every 4 hours PRN Nausea and/or Vomiting      __________________________________________________  REVIEW OF SYSTEMS:    CONSTITUTIONAL: No fever,   EYES: no acute visual disturbances  NECK: No pain or stiffness  RESPIRATORY: No cough; No shortness of breath  CARDIOVASCULAR: No chest pain, no palpitations  GASTROINTESTINAL: No pain. No nausea or vomiting; No diarrhea   NEUROLOGICAL: No headache or numbness, no tremors  MUSCULOSKELETAL: No joint pain, no muscle pain  GENITOURINARY: no dysuria, no frequency, no hesitancy  PSYCHIATRY: no depression , no anxiety  ALL OTHER  ROS negative        Vital Signs Last 24 Hrs  T(C): 36.8 (14 Jan 2022 12:25), Max: 36.9 (13 Jan 2022 19:18)  T(F): 98.2 (14 Jan 2022 12:25), Max: 98.4 (13 Jan 2022 19:18)  HR: 88 (14 Jan 2022 12:25) (76 - 104)  BP: 130/78 (14 Jan 2022 12:25) (130/78 - 151/77)  BP(mean): 102 (13 Jan 2022 19:18) (102 - 102)  RR: 18 (14 Jan 2022 12:25) (18 - 18)  SpO2: 95% (14 Jan 2022 12:25) (94% - 97%)    ________________________________________________  PHYSICAL EXAM:  GENERAL: NAD, pleasant  HEENT: Normocephalic;  conjunctivae and sclerae clear; moist mucous membranes;   NECK : supple  CHEST/LUNG: Clear to auscultation bilaterally with good air entry   HEART: S1 S2  regular; no murmurs, gallops or rubs  ABDOMEN: Soft, Nontender, Nondistended; Bowel sounds present  EXTREMITIES: no cyanosis; no edema; no calf tenderness  SKIN: warm and dry; no rash  NERVOUS SYSTEM:  Awake and alert; Oriented  to place, person and time ; no new deficits    _________________________________________________  LABS:                        13.0   4.27  )-----------( 148      ( 14 Jan 2022 08:07 )             37.0     01-14    136  |  103  |  9   ----------------------------<  141<H>  3.7   |  26  |  0.62    Ca    8.7      14 Jan 2022 08:07  Phos  2.8     01-14  Mg     2.1     01-14    TPro  7.4  /  Alb  3.2<L>  /  TBili  1.1  /  DBili  x   /  AST  46<H>  /  ALT  33  /  AlkPhos  86  01-13    CAPILLARY BLOOD GLUCOSE      POCT Blood Glucose.: 162 mg/dL (14 Jan 2022 16:22)  POCT Blood Glucose.: 142 mg/dL (14 Jan 2022 11:15)  POCT Blood Glucose.: 136 mg/dL (14 Jan 2022 07:28)  POCT Blood Glucose.: 145 mg/dL (13 Jan 2022 21:34)  POCT Blood Glucose.: 137 mg/dL (13 Jan 2022 16:48)    RADIOLOGY & ADDITIONAL TESTS:    Imaging  Reviewed:  YES    Consultant(s) Notes Reviewed:   YES      Plan of care was discussed with patient and /or primary care giver; all questions and concerns were addressed 
NP Note discussed with  primary attending    Patient is a 66y old  Male who presents with a chief complaint of Colitits, EtOH Withrawal (12 Jan 2022 17:37)      INTERVAL HPI/OVERNIGHT EVENTS: no new complaints    MEDICATIONS  (STANDING):  cyanocobalamin 1000 MICROGram(s) Oral daily  enoxaparin Injectable 40 milliGRAM(s) SubCutaneous daily  folic acid 1 milliGRAM(s) Oral daily  insulin lispro (ADMELOG) corrective regimen sliding scale   SubCutaneous Before meals and at bedtime  LORazepam     Tablet 1 milliGRAM(s) Oral every 6 hours  multivitamin 1 Tablet(s) Oral daily  potassium phosphate IVPB 30 milliMole(s) IV Intermittent once  sodium chloride 0.9%. 1000 milliLiter(s) (150 mL/Hr) IV Continuous <Continuous>  thiamine 100 milliGRAM(s) Oral daily    MEDICATIONS  (PRN):  LORazepam   Injectable 2 milliGRAM(s) IV Push every 2 hours PRN Symptom-triggered: 2 point increase in CIWA -Ar score and a total score of 7 or LESS  ondansetron Injectable 4 milliGRAM(s) IV Push every 4 hours PRN Nausea and/or Vomiting      __________________________________________________  REVIEW OF SYSTEMS:    CONSTITUTIONAL: No fever,   EYES: no acute visual disturbances  NECK: No pain or stiffness  RESPIRATORY: No cough; No shortness of breath  CARDIOVASCULAR: No chest pain, no palpitations  GASTROINTESTINAL: No pain. No nausea or vomiting; No diarrhea   NEUROLOGICAL: No headache or numbness, no tremors  MUSCULOSKELETAL: No joint pain, no muscle pain  GENITOURINARY: no dysuria, no frequency, no hesitancy  PSYCHIATRY: no depression , no anxiety  ALL OTHER  ROS negative        Vital Signs Last 24 Hrs  T(C): 36.7 (13 Jan 2022 14:11), Max: 37.1 (12 Jan 2022 18:58)  T(F): 98 (13 Jan 2022 14:11), Max: 98.8 (12 Jan 2022 20:40)  HR: 100 (13 Jan 2022 14:11) (87 - 103)  BP: 144/76 (13 Jan 2022 14:11) (131/73 - 144/76)  BP(mean): --  RR: 18 (13 Jan 2022 14:11) (17 - 18)  SpO2: 96% (13 Jan 2022 14:11) (96% - 98%)    ________________________________________________  PHYSICAL EXAM:  GENERAL: NAD  HEENT:Normocephalic;  conjunctivae and sclerae clear; moist mucous membranes;   NECK : supple  CHEST/LUNG: Clear to auscuitation bilaterally with good air entry   HEART: S1 S2  regular; no murmurs, gallops or rubs  ABDOMEN: Soft, Nontender, Nondistended; Bowel sounds present  EXTREMITIES: no cyanosis; no edema; no calf tenderness  NERVOUS SYSTEM: alert, oriented; no new deficits    _________________________________________________  LABS:                        12.3   4.86  )-----------( 154      ( 13 Jan 2022 08:22 )             36.1     01-13    140  |  106  |  17  ----------------------------<  137<H>  3.8   |  28  |  0.71    Ca    8.5      13 Jan 2022 08:22  Phos  1.4     01-13  Mg     2.9     01-13    TPro  7.4  /  Alb  3.2<L>  /  TBili  1.1  /  DBili  x   /  AST  46<H>  /  ALT  33  /  AlkPhos  86  01-13        CAPILLARY BLOOD GLUCOSE      POCT Blood Glucose.: 137 mg/dL (13 Jan 2022 16:48)  POCT Blood Glucose.: 147 mg/dL (13 Jan 2022 11:35)  POCT Blood Glucose.: 149 mg/dL (13 Jan 2022 07:49)  POCT Blood Glucose.: 166 mg/dL (12 Jan 2022 21:21)  POCT Blood Glucose.: 133 mg/dL (12 Jan 2022 18:21)        RADIOLOGY & ADDITIONAL TESTS:    Imaging Personally Reviewed:  YES/NO    Consultant(s) Notes Reviewed:   YES/ No    Care Discussed with Consultants :     Plan of care was discussed with patient and /or primary care giver; all questions and concerns were addressed and care was aligned with patient's wishes.    
NP Note discussed with  Primary Attending   # [id number]    INTERVAL HPI/OVERNIGHT EVENTS: pt states feeling better, less abdominal pain, no further n/v, or diarrhea.     MEDICATIONS  (STANDING):  cyanocobalamin 1000 MICROGram(s) Oral daily  enoxaparin Injectable 40 milliGRAM(s) SubCutaneous daily  folic acid 1 milliGRAM(s) Oral daily  insulin lispro (ADMELOG) corrective regimen sliding scale   SubCutaneous Before meals and at bedtime  LORazepam   Injectable 2 milliGRAM(s) IV Push every 4 hours  LORazepam   Injectable   IV Push   multivitamin 1 Tablet(s) Oral daily  sodium chloride 0.9%. 1000 milliLiter(s) (150 mL/Hr) IV Continuous <Continuous>  thiamine IVPB 500 milliGRAM(s) IV Intermittent every 8 hours    MEDICATIONS  (PRN):  LORazepam   Injectable 2 milliGRAM(s) IV Push every 2 hours PRN Symptom-triggered: 2 point increase in CIWA -Ar score and a total score of 7 or LESS  ondansetron Injectable 4 milliGRAM(s) IV Push every 4 hours PRN Nausea and/or Vomiting      __________________________________________________  REVIEW OF SYSTEMS:    CONSTITUTIONAL: No fever,   EYES: no acute visual disturbances  NECK: No pain or stiffness  RESPIRATORY: No cough; No shortness of breath  CARDIOVASCULAR: No chest pain, no palpitations  GASTROINTESTINAL: No pain. No nausea or vomiting; No diarrhea   NEUROLOGICAL: No headache or numbness, no tremors  MUSCULOSKELETAL: No joint pain, no muscle pain  GENITOURINARY: no dysuria, no frequency, no hesitancy  PSYCHIATRY: no depression , no anxiety  ALL OTHER  ROS negative        Vital Signs Last 24 Hrs  T(C): 36.8 (12 Jan 2022 10:45), Max: 36.8 (12 Jan 2022 10:45)  T(F): 98.2 (12 Jan 2022 10:45), Max: 98.2 (12 Jan 2022 10:45)  HR: 98 (12 Jan 2022 10:45) (98 - 119)  BP: 137/68 (12 Jan 2022 10:45) (112/65 - 137/68)  BP(mean): --  RR: 16 (12 Jan 2022 10:45) (16 - 18)  SpO2: 95% (12 Jan 2022 10:45) (95% - 96%)    ________________________________________________  PHYSICAL EXAM:  GENERAL: NAD, mildly drowsy.   HEENT: Normocephalic;  conjunctivae and sclerae clear; moist mucous membranes;   NECK : supple  CHEST/LUNG: Clear to auscultation bilaterally with good air entry   HEART: S1 S2  regular; no murmurs, gallops or rubs  ABDOMEN: Soft, Nontender, Nondistended; Bowel sounds present  EXTREMITIES: no cyanosis; no edema; no calf tenderness  SKIN: warm and dry; no rash  NERVOUS SYSTEM:  Awake and alert; Oriented  to person, place, fine tremors at rest.    _________________________________________________  LABS:                        12.4   9.80  )-----------( 184      ( 12 Jan 2022 11:25 )             37.6     01-12    142  |  102  |  25<H>  ----------------------------<  111<H>  5.3   |  13<L>  |  1.32<H>    Ca    9.2      12 Jan 2022 00:41    TPro  9.3<H>  /  Alb  4.0  /  TBili  0.6  /  DBili  x   /  AST  88<H>  /  ALT  47  /  AlkPhos  117  01-12    CAPILLARY BLOOD GLUCOSE    POCT Blood Glucose.: 123 mg/dL (12 Jan 2022 07:12)  POCT Blood Glucose.: 91 mg/dL (11 Jan 2022 23:33)    RADIOLOGY & ADDITIONAL TESTS:    Imaging  Reviewed:  YES  < from: CT Abdomen and Pelvis w/ IV Cont (01.12.22 @ 02:28) >    ACC: [id number] EXAM:  CT ABDOMEN AND PELVIS IC                          PROCEDURE DATE:  01/12/2022          INTERPRETATION:  CLINICAL INFORMATION: 66 years old. Male. Vomiting for 4   days r/o obstruction.    COMPARISON: CT abdomen pelvis 11/5/2021    CONTRAST/COMPLICATIONS:  IV Contrast: Omnipaque 350  90 cc administered  10 cc discarded.  Oral Contrast: NONE  Complications: None reported at time of study completion    PROCEDURE:  CT of the Abdomen and Pelvis was performed.  Sagittal and coronal reformats were performed.    FINDINGS:    LOWER CHEST: Mild bibasilar atelectasis.    LIVER: Hepatic steatosis.  BILE DUCTS: Normal caliber.  GALLBLADDER: Within normal limits.  SPLEEN: Within normal limits.  PANCREAS: Within normal limits.  ADRENALS: Within normal limits.  KIDNEYS/URETERS: Enhance symmetrically. No hydronephrosis. A   subcentimeter hypodensity in left kidney is too small to characterize.    BLADDER: Within normal limits.  REPRODUCTIVE ORGANS: Borderline enlarged prostate.    BOWEL: No bowel obstruction. Appendix is unremarkable. Colonic   diverticulosis without evidence of diverticulitis. Mild to moderate long   segmental colonic wall thickening from the ascending colon and transverse   colon to the splenic flexure.  PERITONEUM: No ascites.  VESSELS: Normal caliber abdominal aorta. Moderate atherosclerosis.  RETROPERITONEUM/LYMPH NODES: No lymphadenopathy.  ABDOMINAL WALL: Small fat-containing bilateral inguinal hernias.  BONES: Degenerative changes in the spine.    IMPRESSION:    Colitis involving the ascending and transverse colon.    --- End of Report ---    DARRIN ALBERTO MD; Attending Radiologist  This document has been electronically signed. Jan 12 2022  2:55AM    < end of copied text >    Consultant(s) Notes Reviewed:   YES      Plan of care was discussed with patient and /or primary care giver; all questions and concerns were addressed

## 2022-01-14 NOTE — PROGRESS NOTE ADULT - ATTENDING COMMENTS
67 y/o M with PMH DM, HTN, alcohol use disorder, presented initially with nausea, vomitting, diarrhea. Suggested to have colitis per imaging. No longer having diarrhea, n/v. Diet advanced. Has been on librium taper, cut taper in half today, since CIWA has been consistently low.
Patient was seen and examined at bedside.  used [id number]. Denies any complains. Reports diarrhea and abd symptoms has resolved. Wants to leave because he is missing work. Assured patient that we can write a letter for his work. After repeated discussion decided to stay.   CIWA 2  Vitals noted     P/E:  GENERAL: NAD, well-groomed, well-developed  HEAD:  Atraumatic, Normocephalic  NERVOUS SYSTEM:  Alert & Oriented X3, Good concentration; Motor Strength 5/5 B/L upper and lower extremities; mildly tremulous  CHEST/LUNG: Clear to auscultation bilaterally; No rales, rhonchi, wheezing, or rubs  HEART: Regular rate and rhythm; No murmurs, rubs, or gallops  ABDOMEN: Soft, Nontender, Nondistended; Bowel sounds present  EXTREMITIES:  2+ Peripheral Pulses, No clubbing, cyanosis, or edema  LYMPH: No lymphadenopathy noted  SKIN: No rashes or lesions    Labs noted     A/P:  Acute alcohol withdrawal  Colitis resolved   DM  HTN    Plan:  Tapered down Ativan, will taper rapid given patient's tendency to leave AMA  Use Ativan PRN dose for breakthrough symptoms  Continue Folic acid, MVI and Thiamine.  Supplement K, MG and Phos accordingly.   Strict Aspiration, Fall & Seizure precautions.   Monitor LFTs, CBC, and BMP   BP and BS well controlled  VIVO meds for discharge   CM and SW for safe discharge
67 y/o M with PMH DM, HTN, alcohol use disorder, presented initially with nausea, vomitting, diarrhea. Suggested to have colitis per imaging. Has been kept NPO. Noted to have been in mild alcohol withdrawal which is now resolving. Lactic acidosis improved, and resolved. Will continue with IVF. Advance diet as tolerated. Monitor for further alcohol withdrawal and treat appropriately with ativan PRN.

## 2022-01-14 NOTE — PROGRESS NOTE ADULT - PROBLEM SELECTOR PLAN 6
lovenox 40mg sq qd
lovenox 40mg sq qd
on metformin at home as per previous dc papers  last A1C 7.7 from Nov 2021  please confirm meds with pharmacy and patient unable to give name  sliding scale ACHS  f/u a1c

## 2022-01-14 NOTE — PROGRESS NOTE ADULT - PROBLEM SELECTOR PLAN 2
CT ap shows colitis, colonic diverticulosis   no further nausea/vomiting or diarrhea   diet advanced and well tolerated  Zofran prn  monitor electrolytes and GI sx
CT ap shows colitis, colonic diverticulosis   no further nausea/vomiting or diarrhea   diet adwanced   Zofran prn  c/w IVF.   electrolytes replaced
CT ap shows colitis, colonic diverticulosis   no further nausea/vomiting or diarrhea per pt  s/p Cipro and flagyl  Advance diet  Zofran prn  c/w IVF.   replace electrolytes  monitor GI sxs.

## 2022-01-15 VITALS
OXYGEN SATURATION: 96 % | HEART RATE: 90 BPM | SYSTOLIC BLOOD PRESSURE: 155 MMHG | TEMPERATURE: 97 F | RESPIRATION RATE: 17 BRPM | DIASTOLIC BLOOD PRESSURE: 87 MMHG

## 2022-01-15 LAB — GLUCOSE BLDC GLUCOMTR-MCNC: 136 MG/DL — HIGH (ref 70–99)

## 2022-01-15 PROCEDURE — 85027 COMPLETE CBC AUTOMATED: CPT

## 2022-01-15 PROCEDURE — 84300 ASSAY OF URINE SODIUM: CPT

## 2022-01-15 PROCEDURE — 87635 SARS-COV-2 COVID-19 AMP PRB: CPT

## 2022-01-15 PROCEDURE — 36415 COLL VENOUS BLD VENIPUNCTURE: CPT

## 2022-01-15 PROCEDURE — 84443 ASSAY THYROID STIM HORMONE: CPT

## 2022-01-15 PROCEDURE — 85025 COMPLETE CBC W/AUTO DIFF WBC: CPT

## 2022-01-15 PROCEDURE — 82009 KETONE BODYS QUAL: CPT

## 2022-01-15 PROCEDURE — 80307 DRUG TEST PRSMV CHEM ANLYZR: CPT

## 2022-01-15 PROCEDURE — 96375 TX/PRO/DX INJ NEW DRUG ADDON: CPT

## 2022-01-15 PROCEDURE — 80061 LIPID PANEL: CPT

## 2022-01-15 PROCEDURE — 83690 ASSAY OF LIPASE: CPT

## 2022-01-15 PROCEDURE — 71045 X-RAY EXAM CHEST 1 VIEW: CPT

## 2022-01-15 PROCEDURE — 99239 HOSP IP/OBS DSCHRG MGMT >30: CPT

## 2022-01-15 PROCEDURE — 93005 ELECTROCARDIOGRAM TRACING: CPT

## 2022-01-15 PROCEDURE — 84100 ASSAY OF PHOSPHORUS: CPT

## 2022-01-15 PROCEDURE — 99285 EMERGENCY DEPT VISIT HI MDM: CPT

## 2022-01-15 PROCEDURE — 80053 COMPREHEN METABOLIC PANEL: CPT

## 2022-01-15 PROCEDURE — 82570 ASSAY OF URINE CREATININE: CPT

## 2022-01-15 PROCEDURE — 74177 CT ABD & PELVIS W/CONTRAST: CPT | Mod: MA

## 2022-01-15 PROCEDURE — 87040 BLOOD CULTURE FOR BACTERIA: CPT

## 2022-01-15 PROCEDURE — 83036 HEMOGLOBIN GLYCOSYLATED A1C: CPT

## 2022-01-15 PROCEDURE — 80048 BASIC METABOLIC PNL TOTAL CA: CPT

## 2022-01-15 PROCEDURE — 83605 ASSAY OF LACTIC ACID: CPT

## 2022-01-15 PROCEDURE — 96374 THER/PROPH/DIAG INJ IV PUSH: CPT

## 2022-01-15 PROCEDURE — 82962 GLUCOSE BLOOD TEST: CPT

## 2022-01-15 PROCEDURE — 82010 KETONE BODYS QUAN: CPT

## 2022-01-15 PROCEDURE — 83735 ASSAY OF MAGNESIUM: CPT

## 2022-01-15 RX ADMIN — Medication 1 MILLIGRAM(S): at 06:24

## 2022-01-15 NOTE — DISCHARGE NOTE NURSING/CASE MANAGEMENT/SOCIAL WORK - PATIENT PORTAL LINK FT
You can access the FollowMyHealth Patient Portal offered by [facility] by registering at the following website: [email or web address]. By joining [employer]’s FollowMyHealth portal, you will also be able to view your health information using other applications (apps) compatible with our system.

## 2022-01-15 NOTE — DISCHARGE NOTE NURSING/CASE MANAGEMENT/SOCIAL WORK - NSDCPEFALRISK_GEN_ALL_CORE
For information on Fall & Injury Prevention, visit: [email or web address] OR  [email or web address] OR  https://www.cdc.gov/steadi/patient.html

## 2022-01-17 LAB
CULTURE RESULTS: SIGNIFICANT CHANGE UP
CULTURE RESULTS: SIGNIFICANT CHANGE UP
SPECIMEN SOURCE: SIGNIFICANT CHANGE UP
SPECIMEN SOURCE: SIGNIFICANT CHANGE UP

## 2022-01-24 ENCOUNTER — EMERGENCY (EMERGENCY)
Facility: HOSPITAL | Age: 67
LOS: 1 days | Discharge: HOME | End: 2022-01-24
Attending: EMERGENCY MEDICINE
Payer: MEDICAID

## 2022-01-24 VITALS
TEMPERATURE: 99 F | RESPIRATION RATE: 18 BRPM | WEIGHT: 148.81 LBS | HEART RATE: 113 BPM | OXYGEN SATURATION: 96 % | HEIGHT: 63 IN | DIASTOLIC BLOOD PRESSURE: 87 MMHG | SYSTOLIC BLOOD PRESSURE: 136 MMHG

## 2022-01-24 LAB
ALBUMIN SERPL ELPH-MCNC: 3.7 G/DL — SIGNIFICANT CHANGE UP (ref 3.5–5)
ALP SERPL-CCNC: 99 U/L — SIGNIFICANT CHANGE UP (ref 40–120)
ALT FLD-CCNC: 71 U/L DA — HIGH (ref 10–60)
ANION GAP SERPL CALC-SCNC: 17 MMOL/L — SIGNIFICANT CHANGE UP (ref 5–17)
AST SERPL-CCNC: 82 U/L — HIGH (ref 10–40)
BASOPHILS # BLD AUTO: 0.03 K/UL — SIGNIFICANT CHANGE UP (ref 0–0.2)
BASOPHILS NFR BLD AUTO: 0.6 % — SIGNIFICANT CHANGE UP (ref 0–2)
BILIRUB SERPL-MCNC: 0.9 MG/DL — SIGNIFICANT CHANGE UP (ref 0.2–1.2)
BUN SERPL-MCNC: 17 MG/DL — SIGNIFICANT CHANGE UP (ref 7–18)
CALCIUM SERPL-MCNC: 8.6 MG/DL — SIGNIFICANT CHANGE UP (ref 8.4–10.5)
CHLORIDE SERPL-SCNC: 96 MMOL/L — SIGNIFICANT CHANGE UP (ref 96–108)
CO2 SERPL-SCNC: 23 MMOL/L — SIGNIFICANT CHANGE UP (ref 22–31)
CREAT SERPL-MCNC: 0.91 MG/DL — SIGNIFICANT CHANGE UP (ref 0.5–1.3)
EOSINOPHIL # BLD AUTO: 0.02 K/UL — SIGNIFICANT CHANGE UP (ref 0–0.5)
EOSINOPHIL NFR BLD AUTO: 0.4 % — SIGNIFICANT CHANGE UP (ref 0–6)
GLUCOSE SERPL-MCNC: 128 MG/DL — HIGH (ref 70–99)
HCT VFR BLD CALC: 39.7 % — SIGNIFICANT CHANGE UP (ref 39–50)
HGB BLD-MCNC: 14 G/DL — SIGNIFICANT CHANGE UP (ref 13–17)
IMM GRANULOCYTES NFR BLD AUTO: 0.2 % — SIGNIFICANT CHANGE UP (ref 0–1.5)
LIDOCAIN IGE QN: 40 U/L — LOW (ref 73–393)
LYMPHOCYTES # BLD AUTO: 1.64 K/UL — SIGNIFICANT CHANGE UP (ref 1–3.3)
LYMPHOCYTES # BLD AUTO: 34.4 % — SIGNIFICANT CHANGE UP (ref 13–44)
MCHC RBC-ENTMCNC: 32.5 PG — SIGNIFICANT CHANGE UP (ref 27–34)
MCHC RBC-ENTMCNC: 35.3 GM/DL — SIGNIFICANT CHANGE UP (ref 32–36)
MCV RBC AUTO: 92.1 FL — SIGNIFICANT CHANGE UP (ref 80–100)
MONOCYTES # BLD AUTO: 0.25 K/UL — SIGNIFICANT CHANGE UP (ref 0–0.9)
MONOCYTES NFR BLD AUTO: 5.2 % — SIGNIFICANT CHANGE UP (ref 2–14)
NEUTROPHILS # BLD AUTO: 2.82 K/UL — SIGNIFICANT CHANGE UP (ref 1.8–7.4)
NEUTROPHILS NFR BLD AUTO: 59.2 % — SIGNIFICANT CHANGE UP (ref 43–77)
NRBC # BLD: 0 /100 WBCS — SIGNIFICANT CHANGE UP (ref 0–0)
PLATELET # BLD AUTO: 234 K/UL — SIGNIFICANT CHANGE UP (ref 150–400)
POTASSIUM SERPL-MCNC: 4 MMOL/L — SIGNIFICANT CHANGE UP (ref 3.5–5.3)
POTASSIUM SERPL-SCNC: 4 MMOL/L — SIGNIFICANT CHANGE UP (ref 3.5–5.3)
PROT SERPL-MCNC: 8.6 G/DL — HIGH (ref 6–8.3)
RBC # BLD: 4.31 M/UL — SIGNIFICANT CHANGE UP (ref 4.2–5.8)
RBC # FLD: 12.5 % — SIGNIFICANT CHANGE UP (ref 10.3–14.5)
SARS-COV-2 RNA SPEC QL NAA+PROBE: SIGNIFICANT CHANGE UP
SODIUM SERPL-SCNC: 136 MMOL/L — SIGNIFICANT CHANGE UP (ref 135–145)
WBC # BLD: 4.77 K/UL — SIGNIFICANT CHANGE UP (ref 3.8–10.5)
WBC # FLD AUTO: 4.77 K/UL — SIGNIFICANT CHANGE UP (ref 3.8–10.5)

## 2022-01-24 PROCEDURE — 99285 EMERGENCY DEPT VISIT HI MDM: CPT

## 2022-01-24 PROCEDURE — 93010 ELECTROCARDIOGRAM REPORT: CPT

## 2022-01-24 RX ORDER — SODIUM CHLORIDE 9 MG/ML
1000 INJECTION INTRAMUSCULAR; INTRAVENOUS; SUBCUTANEOUS ONCE
Refills: 0 | Status: COMPLETED | OUTPATIENT
Start: 2022-01-24 | End: 2022-01-24

## 2022-01-24 RX ORDER — ONDANSETRON 8 MG/1
4 TABLET, FILM COATED ORAL ONCE
Refills: 0 | Status: DISCONTINUED | OUTPATIENT
Start: 2022-01-24 | End: 2022-01-25

## 2022-01-24 RX ORDER — PANTOPRAZOLE SODIUM 20 MG/1
40 TABLET, DELAYED RELEASE ORAL ONCE
Refills: 0 | Status: COMPLETED | OUTPATIENT
Start: 2022-01-24 | End: 2022-01-24

## 2022-01-24 RX ADMIN — Medication 1 MILLIGRAM(S): at 23:53

## 2022-01-24 RX ADMIN — SODIUM CHLORIDE 1000 MILLILITER(S): 9 INJECTION INTRAMUSCULAR; INTRAVENOUS; SUBCUTANEOUS at 23:54

## 2022-01-24 RX ADMIN — PANTOPRAZOLE SODIUM 40 MILLIGRAM(S): 20 TABLET, DELAYED RELEASE ORAL at 23:54

## 2022-01-24 NOTE — ED PROVIDER NOTE - OBJECTIVE STATEMENT
67 y/o male with a history of alcohol abuse, diabetes and hypertension presents to ED with 4 days of nausea, vomiting and abdominal pain. Patient reports 4 days ago, he started vomiting and described it to be nonstop. Patient states he did restart drinking after his discharge from the hospital on [place] 15 and his last drink was yesterday. Patient states today he was unable to keep his liquids down and endorses mild pain on epigastric area of his stomach. Patient denies any fever, diarrhea, cough and shortness of breath. NKDA.

## 2022-01-24 NOTE — ED PROVIDER NOTE - PHYSICAL EXAMINATION
Mild distress. Heart is tachycardic. Lungs clear, belly is soft and nontender. On neuro, patient is alert and oriented x3. Mild hand tremors. No tongue fasciculation.

## 2022-01-24 NOTE — ED PROVIDER NOTE - NSFOLLOWUPCLINICS_GEN_ALL_ED_FT
[facility]  Detox  [address] [place] [address]  Phone: [phone]  Fax: [phone]    [facility]  Detox  [address] [place] [address]  Phone: [phone]  Fax: [phone]

## 2022-01-24 NOTE — ED PROVIDER NOTE - CLINICAL SUMMARY MEDICAL DECISION MAKING FREE TEXT BOX
65 y/o male with a history of alcohol abuse, diabetes and hypertension presents to ED with 4 days of nausea, vomiting and abdominal pain. Will obtain EKG, labs, Ativan for mild withdrawal symptoms and will reassess. 67 y/o male with a history of alcohol abuse, diabetes and hypertension presents to ED with 4 days of nausea, vomiting and abdominal pain. Will obtain EKG, labs, Ativan for mild withdrawal symptoms and will reassess.    labs unremarkable, serum alcohol 150s, given librium  on reeval pt has improvement of hand tremors and resolution of vomiting, patient ambulates without ataxia, pt stable for discharge.

## 2022-01-24 NOTE — ED PROVIDER NOTE - MUSCULOSKELETAL, MLM
Mild hand tremors. Spine appears normal, range of motion is not limited, no muscle or joint tenderness

## 2022-01-24 NOTE — ED PROVIDER NOTE - IV ALTEPLASE INCLUSION HIDDEN
DISCHARGE SUMMARY  To be completed within 30 days of last clinical contact    @Omer Kohler : 1999 MRN: [id number]    Date of Intake: 12/10/2020 Date of Last Session: 2021    Chief Complaint:\"I want to see if it could help me at all.  I was feeling kind of down for a while.\"    Admission Diagnosis:adjustment disorder    D/C Diagnosis Codes: Adjustment disorder with anxious mood  (primary encounter diagnosis)  ADHD (attention deficit hyperactivity disorder), combined type    The above named patient was discharged from my care on 2021 for the following reason(s): Patient has not responded to the provider's engagement effort    Treatment Provided: (check all that apply) Individual    Summary of Patient Progress Toward Goals in the Treatment Plan:  Patient was seen regularly throughout this episode of care.  Patient showed some progress toward treatment goals.  Patient stated ongoing difficulty with focus and paying attention.    No current outpatient medications on file.     No current facility-administered medications for this visit.       Discharge Recommendations: (Include names and addresses of facilities, persons or programs the patient was referred to following discharge.)  Continue counseling as needed    Remaining Discharge Needs: (Include treatment issues not addressed.) n/a      Grayson Caldera MS Date: 2021   Time: 8:28 AM   show

## 2022-01-24 NOTE — ED PROVIDER NOTE - PATIENT PORTAL LINK FT
You can access the FollowMyHealth Patient Portal offered by [facility] by registering at the following website: [email or web address]. By joining [employer]’s FollowMyHealth portal, you will also be able to view your health information using other applications (apps) compatible with our system.

## 2022-01-24 NOTE — ED PROVIDER NOTE - CONSTITUTIONAL, MLM
Mild distress. Well appearing, awake, alert, oriented to person and place, time/situation. normal...

## 2022-01-24 NOTE — ED PROVIDER NOTE - NSFOLLOWUPINSTRUCTIONS_ED_ALL_ED_FT
Síndrome de abstinencia alcohólica    Alcohol Withdrawal Syndrome      El síndrome de abstinencia alcohólica es un conjunto de síntomas que pueden aparecer cuando carolyn persona que manasa mucho y con regularidad lucía de beber o manasa menos que antes. El síndrome de abstinencia alcohólica puede ser leve o grave y puede incluso ser potencialmente mortal.    En general, el síndrome de abstinencia alcohólica afecta a las personas que tienen un trastorno debido al consumo de alcohol, lo que también se denomina alcoholismo. El trastorno debido al consumo de alcohol ocurre cuando carolyn persona no puede controlar cuánto manasa y el hecho de beber demasiado o con mucha frecuencia ocasiona problemas en el hogar, en el trabajo o en las relaciones.      ¿Cuáles son las causas?    El consumo excesivo y frecuente de alcohol provoca cambios en la química del cerebro. Con el paso del tiempo, el cuerpo se vuelve dependiente al alcohol. Cuando se lucía de consumir alcohol, el sistema químico del cerebro se desequilibra y causa los síntomas de abstinencia alcohólica.      ¿Qué incrementa el riesgo?    El síndrome de abstinencia alcohólica es más probable que ocurra en las personas que beben más del límite recomendado de alcohol (2 medidas por día para los hombres o 1 medida por día para las mujeres que no están embarazadas). También es más probable que afecte a las personas que beben demasiado y que consumen alcohol desde hace mucho tiempo. Mientras más harini carolyn persona, y mientras más tiempo lo krish, mayor será el riesgo de síndrome de abstinencia alcohólica.    La abstinencia grave es más probable en carolyn persona que tiene estas características:  •Tuvo abstinencia alcohólica grave en el pasado.      •Tuvo carolyn convulsión jessica un episodio anterior de abstinencia alcohólica.      •Es de edad avanzada.      •Consume otras drogas.      •Tiene un problema médico a marium plazo (crónico), naveed carloyn enfermedad en el corazón, los pulmones o el hígado.      •Tiene depresión.      •No recibe suficientes nutrientes de mendes alimentación (desnutrición).        ¿Cuáles son los signos o los síntomas?    Los síntomas de esta afección pueden ser de leves a moderados, o pueden ser graves. Los síntomas pueden aparecer algunas horas (o hasta un día) después de que la persona cambia jammie hábitos de consumo de alcohol. Jessica las 48 horas después de que la persona lucía de beber, los siguientes síntomas pueden desaparecer o mejorar:  •Temblores incontrolables.      •Sudoración.      •Dolor de tristan.      •Ansiedad.      •Incapacidad de relajarse (agitación).      •Problemas para dormir (insomnio).      •Latidos cardíacos irregulares (palpitaciones).      •Sentir carolyn necesidad imperiosa de beber alcohol.      •Convulsiones.      Los siguientes síntomas pueden empeorar entre 24 y 48 horas después de que la persona ha disminuido o abandonado el consumo de alcohol y pueden mejorar gradualmente en el término de días o semanas:  •Náuseas y vómitos.      •Fatiga.      •Sensibilidad a la samson y los sonidos.      •Confusión e incapacidad de pensar con claridad.      •Pérdida del apetito.      •Cambios de humor, irritabilidad, depresión y ansiedad.      •Insomnio y pesadillas.      Los siguientes síntomas son graves y potencialmente mortales. Cuando estos síntomas se presentan simultáneamente, se denominan delirium tremens (DT):  •Presión arterial cristal.      •Aumento de la frecuencia cardíaca.      •Dificultad para respirar.      •Convulsiones. Estos síntomas pueden desaparecer junto con otros síntomas, o pueden persistir.      •Gianna, oír, sentir, oler o percibir el sabor de cosas que no existen (alucinaciones). Las alucinaciones generalmente comienzan entre 12 y 24 horas después de un cambio en los hábitos de consumo.      Cuando carolyn persona tiene delirium tremens, es necesario hospitalizarla de inmediato.      ¿Cómo se diagnostica?    Esta afección se puede diagnosticar en función de lo siguiente:  •Los síntomas y antecedentes médicos.      •Los antecedentes de consumo de alcohol. El médico puede hacerle preguntas sobre mendes comportamiento con respecto al alcohol. Es importante que responda esas preguntas con sinceridad.      •Carolyn evaluación psicológica.      •Un examen físico.      •Análisis de gail o análisis de orina para medir el nivel de alcohol en la gail y descartar otras causas de los síntomas.      •Resonancia magnética (RM) o exploración por tomografía computarizada (TC). Ivana estudio se puede realizar si la persona tiene pensamientos o comportamientos anormales (estado mental alterado).      El diagnóstico es difícil de realizar. Las personas que tienen abstinencia suelen evitar buscar atención médica y no piensan con claridad. Los amigos y familiares desempeñan un papel importante a la hora de reconocer los síntomas y alentar a jammie seres queridos a recibir tratamiento.      ¿Cómo se trata?    La mayoría de las personas con síntomas de abstinencia pueden recibir tratamiento fuera del entorno hospitalario (tratamiento ambulatorio), con un control estrecho, naveed visitas diarias a un médico y apoyo psicológico. Es posible que el tratamiento deba recibirse en un hospital o centro de tratamiento (tratamiento con hospitalización) cuando la persona:  •Tiene antecedentes de delirium tremens o convulsiones.      •Tiene síntomas graves.      •Es adicta a otras drogas.      •No puede tragar los medicamentos.      •Tiene carolyn enfermedad grave, naveed insuficiencia cardíaca.      •Tuvo abstinencia anteriormente, luisa luego continuó con el consumo de alcohol.      •Es poco probable que respete el programa de un tratamiento ambulatorio.      El tratamiento puede incluir lo siguiente:  •Control de la presión arterial, el pulso y la respiración.      •Líquidos intravenosos (i.v.) para mantener la hidratación.      •Medicamentos para aliviar los síntomas de la abstinencia y el malestar (benzodiazepinas).      •Medicamentos para reducir la ansiedad.      •Medicamentos para prevenir o controlar las convulsiones.      •Multivitaminas y vitaminas B.      •El control diario de un médico.      Es importante recibir tratamiento temprano para la abstinencia alcohólica. Un tratamiento temprano puede contribuir a lo siguiente:  •Acelerar la recuperación de los síntomas de abstinencia.      •Tener más éxito en abandonar el consumo de alcohol a marium plazo (sobriedad).      Si necesita ayuda para dejar de consumir alcohol, el médico puede recomendar un plan de tratamiento a marium plazo que incluya:  •Medicamentos para ayudar a tratar el trastorno por consumo de bebidas alcohólicas.      •Apoyo psicológico para el abuso de sustancias.      •Grupos de apoyo.        Siga estas indicaciones en mendes casa:     •[place] los medicamentos de venta migue y los recetados (incluidos los suplementos de vitaminas) solamente naveed se lo haya indicado el médico.      • No harini alcohol.      • No conduzca hasta que el médico lo autorice.      •Pídale a carolyn persona de mendes confianza que se quede con usted o que esté disponible por si necesita ayuda con jammie síntomas o para no beber.      •Harini suficiente líquido naveed para mantener la orina de color amarillo pálido.      •Considere unirse a un programa de tratamiento o a un adolfo de apoyo para tratar el alcoholismo. Estos pueden brindar apoyo emocional, consejos y orientación.      •Concurra a todas las visitas de seguimiento naveed se lo haya indicado el médico. [place] es importante.        Comuníquese con un médico si:    •Los síntomas empeoran en vez de aliviarse.      •No puede comer ni beber sin vomitar.      •Le está costando demasiado no beber alcohol.      •No puede dejar de beber alcohol.        Solicite ayuda de inmediato si:    •Tiene latidos cardíacos irregulares.      •Siente dolor en el pecho.      •Tiene dificultad para respirar.      •Tiene carolyn convulsión por primera vez.      •Tiene alucinaciones.      •Se siente confundido.        Resumen    •La abstinencia alcohólica es un adolfo de síntomas que pueden aparecer cuando carolyn persona que manasa mucho y con regularidad lucía de beber o manasa menos que antes.      •Los síntomas de esta afección pueden ser de leves a moderados, o pueden ser graves.      •El tratamiento puede incluir hospitalización, medicamentos y apoyo psicológico.

## 2022-01-25 VITALS
HEART RATE: 90 BPM | TEMPERATURE: 98 F | DIASTOLIC BLOOD PRESSURE: 81 MMHG | SYSTOLIC BLOOD PRESSURE: 150 MMHG | RESPIRATION RATE: 18 BRPM | OXYGEN SATURATION: 95 %

## 2022-01-25 LAB
APPEARANCE UR: CLEAR — SIGNIFICANT CHANGE UP
BILIRUB UR-MCNC: NEGATIVE — SIGNIFICANT CHANGE UP
COLOR SPEC: YELLOW — SIGNIFICANT CHANGE UP
DIFF PNL FLD: NEGATIVE — SIGNIFICANT CHANGE UP
ETHANOL SERPL-MCNC: 157 MG/DL — HIGH (ref 0–10)
GLUCOSE UR QL: NEGATIVE — SIGNIFICANT CHANGE UP
KETONES UR-MCNC: ABNORMAL
LEUKOCYTE ESTERASE UR-ACNC: NEGATIVE — SIGNIFICANT CHANGE UP
NITRITE UR-MCNC: NEGATIVE — SIGNIFICANT CHANGE UP
PH UR: 6 — SIGNIFICANT CHANGE UP (ref 5–8)
PROT UR-MCNC: 30 MG/DL
SP GR SPEC: 1.03 — HIGH (ref 1.01–1.02)
UROBILINOGEN FLD QL: 4

## 2022-01-25 PROCEDURE — 36415 COLL VENOUS BLD VENIPUNCTURE: CPT

## 2022-01-25 PROCEDURE — 96374 THER/PROPH/DIAG INJ IV PUSH: CPT

## 2022-01-25 PROCEDURE — 87635 SARS-COV-2 COVID-19 AMP PRB: CPT

## 2022-01-25 PROCEDURE — 96375 TX/PRO/DX INJ NEW DRUG ADDON: CPT

## 2022-01-25 PROCEDURE — 80307 DRUG TEST PRSMV CHEM ANLYZR: CPT

## 2022-01-25 PROCEDURE — 87086 URINE CULTURE/COLONY COUNT: CPT

## 2022-01-25 PROCEDURE — 82962 GLUCOSE BLOOD TEST: CPT

## 2022-01-25 PROCEDURE — 80053 COMPREHEN METABOLIC PANEL: CPT

## 2022-01-25 PROCEDURE — 83690 ASSAY OF LIPASE: CPT

## 2022-01-25 PROCEDURE — 99284 EMERGENCY DEPT VISIT MOD MDM: CPT | Mod: 25

## 2022-01-25 PROCEDURE — 81001 URINALYSIS AUTO W/SCOPE: CPT

## 2022-01-25 PROCEDURE — 85025 COMPLETE CBC W/AUTO DIFF WBC: CPT

## 2022-01-25 PROCEDURE — 93005 ELECTROCARDIOGRAM TRACING: CPT

## 2022-01-25 RX ADMIN — Medication 30 MILLILITER(S): at 03:59

## 2022-01-25 RX ADMIN — Medication 100 MILLIGRAM(S): at 03:59

## 2022-01-25 RX ADMIN — Medication 50 MILLIGRAM(S): at 05:05

## 2022-01-26 LAB
CULTURE RESULTS: SIGNIFICANT CHANGE UP
SPECIMEN SOURCE: SIGNIFICANT CHANGE UP

## 2022-02-17 ENCOUNTER — EMERGENCY (EMERGENCY)
Facility: HOSPITAL | Age: 67
LOS: 1 days | Discharge: HOME | End: 2022-02-17
Attending: EMERGENCY MEDICINE
Payer: MEDICAID

## 2022-02-17 VITALS
HEIGHT: 63 IN | HEART RATE: 97 BPM | OXYGEN SATURATION: 95 % | DIASTOLIC BLOOD PRESSURE: 74 MMHG | TEMPERATURE: 99 F | WEIGHT: 153.22 LBS | SYSTOLIC BLOOD PRESSURE: 125 MMHG | RESPIRATION RATE: 19 BRPM

## 2022-02-17 LAB
ALBUMIN SERPL ELPH-MCNC: 3.4 G/DL — LOW (ref 3.5–5)
ALP SERPL-CCNC: 97 U/L — SIGNIFICANT CHANGE UP (ref 40–120)
ALT FLD-CCNC: 107 U/L DA — HIGH (ref 10–60)
ANION GAP SERPL CALC-SCNC: 11 MMOL/L — SIGNIFICANT CHANGE UP (ref 5–17)
AST SERPL-CCNC: 167 U/L — HIGH (ref 10–40)
BASOPHILS # BLD AUTO: 0.04 K/UL — SIGNIFICANT CHANGE UP (ref 0–0.2)
BASOPHILS NFR BLD AUTO: 0.9 % — SIGNIFICANT CHANGE UP (ref 0–2)
BILIRUB SERPL-MCNC: 0.4 MG/DL — SIGNIFICANT CHANGE UP (ref 0.2–1.2)
BUN SERPL-MCNC: 13 MG/DL — SIGNIFICANT CHANGE UP (ref 7–18)
CALCIUM SERPL-MCNC: 7.9 MG/DL — LOW (ref 8.4–10.5)
CHLORIDE SERPL-SCNC: 106 MMOL/L — SIGNIFICANT CHANGE UP (ref 96–108)
CO2 SERPL-SCNC: 26 MMOL/L — SIGNIFICANT CHANGE UP (ref 22–31)
CREAT SERPL-MCNC: 0.71 MG/DL — SIGNIFICANT CHANGE UP (ref 0.5–1.3)
EOSINOPHIL # BLD AUTO: 0.06 K/UL — SIGNIFICANT CHANGE UP (ref 0–0.5)
EOSINOPHIL NFR BLD AUTO: 1.3 % — SIGNIFICANT CHANGE UP (ref 0–6)
GLUCOSE SERPL-MCNC: 112 MG/DL — HIGH (ref 70–99)
HCT VFR BLD CALC: 37.4 % — LOW (ref 39–50)
HGB BLD-MCNC: 12.7 G/DL — LOW (ref 13–17)
IMM GRANULOCYTES NFR BLD AUTO: 0 % — SIGNIFICANT CHANGE UP (ref 0–1.5)
LIDOCAIN IGE QN: 243 U/L — SIGNIFICANT CHANGE UP (ref 73–393)
LYMPHOCYTES # BLD AUTO: 2.75 K/UL — SIGNIFICANT CHANGE UP (ref 1–3.3)
LYMPHOCYTES # BLD AUTO: 61.2 % — HIGH (ref 13–44)
MCHC RBC-ENTMCNC: 33 PG — SIGNIFICANT CHANGE UP (ref 27–34)
MCHC RBC-ENTMCNC: 34 GM/DL — SIGNIFICANT CHANGE UP (ref 32–36)
MCV RBC AUTO: 97.1 FL — SIGNIFICANT CHANGE UP (ref 80–100)
MONOCYTES # BLD AUTO: 0.26 K/UL — SIGNIFICANT CHANGE UP (ref 0–0.9)
MONOCYTES NFR BLD AUTO: 5.8 % — SIGNIFICANT CHANGE UP (ref 2–14)
NEUTROPHILS # BLD AUTO: 1.38 K/UL — LOW (ref 1.8–7.4)
NEUTROPHILS NFR BLD AUTO: 30.8 % — LOW (ref 43–77)
NRBC # BLD: 0 /100 WBCS — SIGNIFICANT CHANGE UP (ref 0–0)
PLATELET # BLD AUTO: 190 K/UL — SIGNIFICANT CHANGE UP (ref 150–400)
POTASSIUM SERPL-MCNC: 3.5 MMOL/L — SIGNIFICANT CHANGE UP (ref 3.5–5.3)
POTASSIUM SERPL-SCNC: 3.5 MMOL/L — SIGNIFICANT CHANGE UP (ref 3.5–5.3)
PROT SERPL-MCNC: 7.9 G/DL — SIGNIFICANT CHANGE UP (ref 6–8.3)
RBC # BLD: 3.85 M/UL — LOW (ref 4.2–5.8)
RBC # FLD: 13.3 % — SIGNIFICANT CHANGE UP (ref 10.3–14.5)
SODIUM SERPL-SCNC: 143 MMOL/L — SIGNIFICANT CHANGE UP (ref 135–145)
TROPONIN I, HIGH SENSITIVITY RESULT: 3.5 NG/L — SIGNIFICANT CHANGE UP
WBC # BLD: 4.49 K/UL — SIGNIFICANT CHANGE UP (ref 3.8–10.5)
WBC # FLD AUTO: 4.49 K/UL — SIGNIFICANT CHANGE UP (ref 3.8–10.5)

## 2022-02-17 PROCEDURE — 93010 ELECTROCARDIOGRAM REPORT: CPT

## 2022-02-17 PROCEDURE — 80053 COMPREHEN METABOLIC PANEL: CPT

## 2022-02-17 PROCEDURE — 99285 EMERGENCY DEPT VISIT HI MDM: CPT

## 2022-02-17 PROCEDURE — 76705 ECHO EXAM OF ABDOMEN: CPT | Mod: 26

## 2022-02-17 PROCEDURE — 83690 ASSAY OF LIPASE: CPT

## 2022-02-17 PROCEDURE — 85025 COMPLETE CBC W/AUTO DIFF WBC: CPT

## 2022-02-17 PROCEDURE — 76705 ECHO EXAM OF ABDOMEN: CPT

## 2022-02-17 PROCEDURE — 99284 EMERGENCY DEPT VISIT MOD MDM: CPT | Mod: 25

## 2022-02-17 PROCEDURE — 84484 ASSAY OF TROPONIN QUANT: CPT

## 2022-02-17 PROCEDURE — 93005 ELECTROCARDIOGRAM TRACING: CPT

## 2022-02-17 PROCEDURE — 96375 TX/PRO/DX INJ NEW DRUG ADDON: CPT

## 2022-02-17 PROCEDURE — 36415 COLL VENOUS BLD VENIPUNCTURE: CPT

## 2022-02-17 PROCEDURE — 82962 GLUCOSE BLOOD TEST: CPT

## 2022-02-17 PROCEDURE — 96361 HYDRATE IV INFUSION ADD-ON: CPT

## 2022-02-17 PROCEDURE — 96374 THER/PROPH/DIAG INJ IV PUSH: CPT

## 2022-02-17 RX ORDER — FAMOTIDINE 10 MG/ML
20 INJECTION INTRAVENOUS ONCE
Refills: 0 | Status: COMPLETED | OUTPATIENT
Start: 2022-02-17 | End: 2022-02-17

## 2022-02-17 RX ORDER — FAMOTIDINE 10 MG/ML
1 INJECTION INTRAVENOUS
Qty: 10 | Refills: 0
Start: 2022-02-17 | End: 2022-02-26

## 2022-02-17 RX ORDER — SODIUM CHLORIDE 9 MG/ML
1000 INJECTION INTRAMUSCULAR; INTRAVENOUS; SUBCUTANEOUS ONCE
Refills: 0 | Status: COMPLETED | OUTPATIENT
Start: 2022-02-17 | End: 2022-02-17

## 2022-02-17 RX ORDER — ONDANSETRON 8 MG/1
4 TABLET, FILM COATED ORAL ONCE
Refills: 0 | Status: COMPLETED | OUTPATIENT
Start: 2022-02-17 | End: 2022-02-17

## 2022-02-17 RX ADMIN — SODIUM CHLORIDE 1000 MILLILITER(S): 9 INJECTION INTRAMUSCULAR; INTRAVENOUS; SUBCUTANEOUS at 19:34

## 2022-02-17 RX ADMIN — SODIUM CHLORIDE 1000 MILLILITER(S): 9 INJECTION INTRAMUSCULAR; INTRAVENOUS; SUBCUTANEOUS at 21:24

## 2022-02-17 RX ADMIN — FAMOTIDINE 20 MILLIGRAM(S): 10 INJECTION INTRAVENOUS at 19:34

## 2022-02-17 RX ADMIN — ONDANSETRON 4 MILLIGRAM(S): 8 TABLET, FILM COATED ORAL at 19:34

## 2022-02-17 NOTE — ED PROVIDER NOTE - PATIENT PORTAL LINK FT
Monitor room called notifying that pt had 10 beats of vtach. Self-resolving back into SR.Pt asymptomatic. Vitals stable   You can access the FollowMyHealth Patient Portal offered by [facility] by registering at the following website: [email or web address]. By joining [facility]’s FollowMyHealth portal, you will also be able to view your health information using other applications (apps) compatible with our system.

## 2022-02-17 NOTE — ED PROVIDER NOTE - OBJECTIVE STATEMENT
65 y/o man, h/o DM, HTN, drinks alcohol (says occasionally), BIB EMS from home for vomiting and epigastric pain x 2 days.  Pt reports decreased PO intake.  Denies any blood in vomitus.  No fever/diarrhea/constipation.

## 2022-02-17 NOTE — ED ADULT NURSE NOTE - BREATH SOUNDS, MLM
----- Message from Jeny Jones PA-C sent at 2/9/2022  9:04 AM CST -----  Regarding: MRI results  Please call this patient let him know that I reviewed the MRI lumbar spine.  At L4-5 there is moderate disc degeneration and moderate facet arthropathy causing a moderate degree of narrowing around the nerves as they exit out of the spine which I suspect is the cause of the pain radiating down his leg.  I recommend that the patient trial physical therapy for 4 weeks.  I would like him to do the medx program.  He can then follow-up with me to monitor progress.  If physical therapy is not helpful we could try an epidural steroid injection.  I am happy to see him sooner if he prefers to review the results.     Clear

## 2022-02-17 NOTE — ED ADULT TRIAGE NOTE - WEIGHT IN KG
69.5 Imiquimod Counseling:  I discussed with the patient the risks of imiquimod including but not limited to erythema, scaling, itching, weeping, crusting, and pain.  Patient understands that the inflammatory response to imiquimod is variable from person to person and was educated regarded proper titration schedule.  If flu-like symptoms develop, patient knows to discontinue the medication and contact us.

## 2022-02-17 NOTE — ED ADULT TRIAGE NOTE - CHIEF COMPLAINT QUOTE
As per EMS, pt  from street in front of house, admits drinking tequila x today. At triage, pt drowsy, ambulate with one assistance.

## 2022-02-17 NOTE — ED PROVIDER NOTE - NSFOLLOWUPINSTRUCTIONS_ED_ALL_ED_FT
EnglishSpanishTraditional Chinese                                                                           Enfermedad hepática por alcoholismo    Alcoholic Liver Disease       La enfermedad hepática por alcoholismo hace referencia al daño en el hígado causado por beber alcohol en exceso zachery un período prolongado. El hígado es un órgano que cumple las siguientes funciones:  •Ayuda a dividir (descomponer) y absorber las grasas y otros nutrientes presentes en la gail.      •Contribuye a eliminar las sustancias nocivas de la gail.      •Produce las partes de la gail que ayudan a formar coágulos y prevenir el sangrado excesivo.      Si el hígado se daña, puede dejar de funcionar correctamente.    Hay perico tipos principales de enfermedad hepática por alcoholismo y, en general, ocurren en el siguiente orden:  1.Enfermedad del hígado graso. Se considera la etapa temprana de la enfermedad hepática por alcoholismo. Es carolyn enfermedad en la que se acumula demasiada grasa en las células del hígado. En muchos casos, la enfermedad del hígado graso no provoca ningún síntoma. Con frecuencia, se diagnostica cuando se realizan análisis de laboratorio por otros motivos.      2.Hepatitis alcohólica. Es carolyn inflamación del hígado que disminuye flynn capacidad de funcionar normalmente.      3.Cirrosis por alcohol. La cirrosis es carolyn lesión de marium plazo (crónica) en el hígado. En las personas que tienen cirrosis, muchas de las células hepáticas sanas kinney sido reemplazadas por tejido cicatricial. Elkin impide que la gail circule por el hígado, lo que dificulta el funcionamiento de ivana órgano.      Generalmente, los síntomas de esta afección empeoran (progresan) con el paso del tiempo si se sigue consumiendo alcohol. El tratamiento exige abandonar por completo el consumo de alcohol.      ¿Cuáles son las causas?    La enfermedad hepática por alcoholismo es causada por el consumo abundante y crónico de alcohol.    El hígado filtra el alcohol del torrente sanguíneo. Cuando el alcohol se descompone en el hígado, libera ciertas sustancias químicas perjudiciales (tóxicas) que dañan las células hepáticas.      ¿Qué incrementa el riesgo?    Es más probable que esta afección se manifieste en:  •Mujeres.      •Personas obesas.      •Personas con antecedentes familiares de enfermedad hepática por alcoholismo.      •Personas que beben regularmente grandes cantidades de alcohol en un período de tiempo corto (consumo intensivo).      •Personas que tienen carolyn enfermedad hepática subyacente, naveed hepatitis B o hepatitis C.      •Personas con carolyn alimentación deficiente o que carecen de ciertos nutrientes, naveed folato o tiamina (tienen deficiencia de nutrientes).        ¿Cuáles son los signos o síntomas?    La mayoría de las personas no tienen síntomas en las etapas iniciales de esta enfermedad. Si hay síntomas tempranos, estos pueden ser los siguientes:  •Pérdida del apetito.      •Náuseas y vómitos.      Los síntomas de enfermedad moderada incluyen los siguientes:  •Diarrea.      •Fiebre.      •Sensación de cansancio (fatiga).      •Pérdida de peso sin proponérselo.      •Coloración amarillenta de la piel y la parte kadie de los ojos (ictericia).      •Dolor e hinchazón en el abdomen.      Los síntomas de enfermedad avanzada incluyen los siguientes:  •Adelgazamiento y pérdida de masa muscular.      •Picazón en la piel.      •Acumulación de líquido en el abdomen (ascitis).      •Hinchazón de los pies y los tobillos (edema).      •Hemorragias nasales o encías sangrantes.      •Dificultad para concentrarse.      •Cambios en el estado de ánimo o agitación.      •Confusión.      Algunas personas no tienen síntomas hasta que la enfermedad está avanzada. Con frecuencia, los síntomas empeoran inmediatamente después de un período de consumo excesivo de alcohol.      ¿Cómo se diagnostica?    Esta afección se puede diagnosticar en función de lo siguiente:  •Un examen físico.      •Análisis de gail.    •Pruebas que generan imágenes detalladas del cuerpo. Pueden incluir:  •Ecografía del hígado.      •Exploración por tomografía computarizada (TC).      •Resonancia magnética (RM).        •Biopsia de hígado. En ivana estudio, se extrae carolyn pequeña muestra de tejido hepático y se examina para buscar signos de daño.        ¿Cómo se trata?    La parte más importante del tratamiento es dejar de consumir alcohol. Si es adicto al alcohol, el médico lo ayudará a elaborar un plan para dejarlo. Ivana plan puede implicar lo siguiente:  •Demian medicamentos para aliviar los síntomas desagradables que se producen al dejar el alcohol o disminuir flynn consumo (síndrome de abstinencia).      •Entrar a un programa de tratamiento que lo ayude a dejar de beber.      •Unirse a un adolfo de apoyo.      El tratamiento de la enfermedad hepática por alcoholismo también puede incluir lo siguiente:•Terapia nutricional. Flynn médico o un nutricionista puede recomendarle lo siguiente:  •Seguir carolyn dieta saludable.      •Demian vitaminas.      •[place] alimentos que contengan mucho zinc o vitaminas B, naveed folato, tiamina o piridoxina.        •Medicamentos con corticoesteroides para reducir la inflamación del hígado. Zach vez se los recomienden si flynn enfermedad está avanzada.      •Recibir un hígado amy (trasplante de hígado). Elkin solo se hace en casos muy graves y únicamente en las personas que kinney dejado de beber de manera permanente y pueden comprometerse a no volver a beber alcohol nunca más.        Siga estas instrucciones en flynn casa:     • No harini alcohol. Siga flynn plan de tratamiento y consulte con flynn médico según sea necesario.      •Considere la posibilidad de participar en un adolfo de apoyo para personas alcohólicas. Estos grupos pueden brindar apoyo emocional y orientación.      •Use los medicamentos de venta migue y los recetados solamente naveed se lo haya indicado el médico. Estos incluyen las vitaminas y los suplementos.      • No use medicamentos ni consuma alimentos que contengan alcohol a menos que se lo indique el médico.      •Siga las indicaciones del médico o del nutricionista acerca de consumir alimentos saludables.      •Cumpla con todas las visitas de seguimiento. Elkin es importante.        Dónde buscar apoyo    •Alcoholics Anonymous (Alcohólicos Anónimos): aa.org        Comuníquese con un médico si:    •Tiene fiebre o escalofríos.      •La piel se le torna de un color más amarillento, pálida u oscura.      •Comienza a sentir germán de tristan.        Solicite ayuda de inmediato si:    •Vomita gail.      •Tiene heces negras y de aspecto alquitranado, u observa gail richard brillante en las heces.    •Tiene dificultad para hacer lo siguiente:  •Razonar.      •Caminar.      •Mantener el equilibrio.      •Respirar.        Estos síntomas pueden representar un problema grave que constituye carolyn emergencia. No espere a samira si los síntomas desaparecen. Solicite atención médica de inmediato. Comuníquese con el servicio de emergencias de flynn localidad (911 en los [place]). No conduzca por verena propios medios hasta el hospital.       Resumen    •La enfermedad hepática por alcoholismo hace referencia al daño en el hígado causado por beber alcohol en exceso zachery un período prolongado.      •Los síntomas de esta afección empeoran (avanzan) con el paso del tiempo si se sigue consumiendo alcohol.      •El médico le hará un examen físico y otros estudios para diagnosticar flynn enfermedad.      •La parte más importante del tratamiento es dejar de consumir alcohol. Siga flynn plan de tratamiento y consulte con flynn médico según sea necesario.      Esta información no tiene naveed fin reemplazar el consejo del médico. Asegúrese de hacerle al médico cualquier pregunta que tenga.      Document Revised: 10/25/2021 Document Reviewed: 10/25/2021    [employer] Patient Education © 2021 [employer]              [facility]                                                                                                                               Gastritis - Adultos    Gastritis, Adult       La gastritis es la inflamación del estómago. Hay dos tipos de gastritis:  •Gastritis aguda. Ivana tipo aparece de manera repentina.      •Gastritis crónica. Ivana tipo es mucho más frecuente y dura mucho tiempo.      La gastritis se manifiesta cuando el revestimiento del estómago se debilita o se lesiona. Sin tratamiento, la gastritis puede causar sangrado y úlceras estomacales.      ¿Cuáles son las causas?    Esta afección puede ser causada por lo siguiente:  •Infección.      •Beber alcohol en exceso.      •Ciertos medicamentos. Estos incluyen corticoesteroides, antibióticos y algunos medicamentos de venta sin receta, naveed aspirina o ibuprofeno.      •Hay demasiado ácido en el estómago.      •Carolyn enfermedad del intestino o del estómago.      •Estrés.      •Carolyn reacción alérgica.      •Enfermedad de Crohn.      •Algunos tratamientos para el cáncer (radiación).      A veces, se desconoce la causa de esta afección.      ¿Cuáles son los signos o los síntomas?    Los síntomas de esta afección incluyen los siguientes:  •Dolor o sensación de ardor en la parte superior del abdomen.      •Náuseas.      •Vómitos.      •Sensación molesta de saciedad después de comer.      •Pérdida de peso.      •Mal aliento.      •Gail en el vómito o las heces.      En algunos casos, no hay síntomas.      ¿Cómo se diagnostica?    Esta afección puede diagnosticarse en función de lo siguiente:  •Verena antecedentes médicos y carolyn descripción de verena síntomas.      •Un examen físico.    •Estudios. Estos pueden incluir los siguientes:  •Análisis de gail.      •Pruebas de heces.      •Un estudio en el que se introduce un instrumento mcmillan y flexible con carolyn samson y carolyn pequeña cámara a través del esófago hasta el estómago (endoscopía cristal).      •Un estudio en el cual se justus carolyn muestra de tejido para analizarlo (biopsia).          ¿Cómo se trata?    Esta afección se puede tratar con medicamentos. Los medicamentos que se utilizan varían según la causa de la gastritis:  •Si la afección se debe a carolyn infección bacteriana, pueden recetarle medicamentos antibióticos.      •Si la afección se debe al exceso de ácido estomacal, se pueden administrar medicamentos denominados bloqueadores H2, inhibidores de la bomba de protones o antiácidos.      El tratamiento puede también incluir interrumpir el uso de ciertos medicamentos naveed aspirina, ibuprofeno u otros antiinflamatorios no esteroideos (LUIS ALBERTO).      Siga estas indicaciones en flynn casa:    Medicamentos     •[place] los medicamentos de venta migue y los recetados solamente naveed se lo haya indicado el médico.      •Si le recetaron un antibiótico, tómelo naveed se lo haya indicado el médico. No deje de demian el antibiótico, aunque comience a sentirse mejor.        Comida y bebida      •Sophie comidas pequeñas y frecuentes zachery el día en lugar de comidas abundantes.      •Evite los alimentos y las bebidas que intensifican los síntomas.      •Harini suficiente líquido naveed para mantener la orina de color amarillo pálido.      Consumo de alcohol   • No harini alcohol si:  •Flynn médico le indica no hacerlo.      •Está embarazada, puede estar embarazada o está tratando de quedar embarazada.      •Si manasa alcohol:•Limite flynn uso a las siguientes medidas:  •De 0 a 1 medida por día para las mujeres.      •De 0 a 2 medidas por día para los hombres.        •Esté atento a la cantidad de alcohol que hay en las bebidas que justus. En los [place], carolyn medida equivale a carolyn botella de cerveza de 12 oz (355 ml), un vaso de vino de 5 oz (148 ml) o un vaso de carolyn bebida alcohólica de cristal graduación de 1½ oz (44 ml).        Indicaciones generales     •Hable con el médico sobre las formas de controlar el estrés, naveed realizar ejercicio con regularidad o practicar respiración profunda, meditación o yoga.      • No consuma ningún producto que contenga nicotina o tabaco, naveed cigarrillos y cigarrillos electrónicos. Si necesita ayuda para dejar de fumar, consulte al médico.      •Concurra a todas las visitas de seguimiento naveed se lo haya indicado el médico. Elkin es importante.        Comuníquese con un médico si:    •Verena síntomas empeoran.      •Los síntomas regresan después del tratamiento.        Solicite ayuda inmediatamente si:    •Vomita gail de color fernandez brillante o carolyn sustancia similar a los granos de café.      •Las heces son negras o de color fernandez oscuro.      •No puede retener los líquidos.      •El dolor abdominal empeora.      •Tiene fiebre.      •No se siente mejor luego de carolyn semana.        Resumen    •La gastritis es la inflamación del revestimiento del estómago que puede ocurrir de manera repentina (aguda) o desarrollarse lentamente con el tiempo (crónica).      •Esta afección se diagnostica mediante los antecedentes médicos, un examen físico o estudios.      •Esta afección puede tratarse con medicamentos para tratar la infección o medicamentos para reducir la cantidad de ácido en el estómago.      •Siga las indicaciones del médico acerca de demian medicamentos, hacer cambios en la dieta y saber cuándo pedir ayuda.      Esta información no tiene naveed fin reemplazar el consejo del médico. Asegúrese de hacerle al médico cualquier pregunta que tenga.      Document Revised: 06/26/2019 Document Reviewed: 06/26/2019    [employer]vier Patient Education © 2021 [employer]

## 2022-02-17 NOTE — ED ADULT NURSE NOTE - OBJECTIVE STATEMENT
Patient calm, drowsy, in no acute distress, denies any symptoms at this time, reports drinking tequila earlier today. Denies danger to self or others.

## 2022-02-17 NOTE — ED PROVIDER NOTE - NS ED MD DISPO DISCHARGE CCDA
Patient/Caregiver provided printed discharge information.
<<----- Click to add NO significant Past Surgical History

## 2022-02-17 NOTE — ED ADULT NURSE NOTE - NSIMPLEMENTINTERV_GEN_ALL_ED
Implemented All Fall Risk Interventions:  [place] to call system. Call bell, personal items and telephone within reach. Instruct patient to call for assistance. Room bathroom lighting operational. Non-slip footwear when patient is off stretcher. Physically safe environment: no spills, clutter or unnecessary equipment. Stretcher in lowest position, wheels locked, appropriate side rails in place. Provide visual cue, wrist band, yellow gown, etc. Monitor gait and stability. Monitor for mental status changes and reorient to person, place, and time. Review medications for side effects contributing to fall risk. Reinforce activity limits and safety measures with patient and family.

## 2022-02-17 NOTE — ED PROVIDER NOTE - CLINICAL SUMMARY MEDICAL DECISION MAKING FREE TEXT BOX
65 y/o man, h/o DM, HTN, drinks alcohol (says occasionally), BIB EMS from home for vomiting and epigastric pain.  Denies any blood in vomitus--labs, IVF, US RUQ, symptomatic care, reassess.  Pt is clinically sober on initial exam.

## 2022-02-18 VITALS
HEART RATE: 91 BPM | SYSTOLIC BLOOD PRESSURE: 143 MMHG | RESPIRATION RATE: 18 BRPM | TEMPERATURE: 98 F | OXYGEN SATURATION: 95 % | DIASTOLIC BLOOD PRESSURE: 74 MMHG

## 2022-03-13 ENCOUNTER — INPATIENT (INPATIENT)
Facility: HOSPITAL | Age: 67
LOS: 2 days | Discharge: HOME | DRG: 896 | End: 2022-03-16
Attending: INTERNAL MEDICINE | Admitting: INTERNAL MEDICINE
Payer: MEDICAID

## 2022-03-13 VITALS
HEART RATE: 119 BPM | OXYGEN SATURATION: 100 % | WEIGHT: 165.35 LBS | TEMPERATURE: 98 F | DIASTOLIC BLOOD PRESSURE: 85 MMHG | HEIGHT: 63 IN | SYSTOLIC BLOOD PRESSURE: 169 MMHG | RESPIRATION RATE: 21 BRPM

## 2022-03-13 DIAGNOSIS — Z29.9 ENCOUNTER FOR PROPHYLACTIC MEASURES, UNSPECIFIED: ICD-10-CM

## 2022-03-13 DIAGNOSIS — E87.2 ACIDOSIS: ICD-10-CM

## 2022-03-13 DIAGNOSIS — I10 ESSENTIAL (PRIMARY) HYPERTENSION: ICD-10-CM

## 2022-03-13 DIAGNOSIS — Z87.898 PERSONAL HISTORY OF OTHER SPECIFIED CONDITIONS: ICD-10-CM

## 2022-03-13 DIAGNOSIS — R79.89 OTHER SPECIFIED ABNORMAL FINDINGS OF BLOOD CHEMISTRY: ICD-10-CM

## 2022-03-13 DIAGNOSIS — E11.9 TYPE 2 DIABETES MELLITUS WITHOUT COMPLICATIONS: ICD-10-CM

## 2022-03-13 DIAGNOSIS — R11.2 NAUSEA WITH VOMITING, UNSPECIFIED: ICD-10-CM

## 2022-03-13 DIAGNOSIS — R74.01 ELEVATION OF LEVELS OF LIVER TRANSAMINASE LEVELS: ICD-10-CM

## 2022-03-13 DIAGNOSIS — R10.9 UNSPECIFIED ABDOMINAL PAIN: ICD-10-CM

## 2022-03-13 DIAGNOSIS — F10.239 ALCOHOL DEPENDENCE WITH WITHDRAWAL, UNSPECIFIED: ICD-10-CM

## 2022-03-13 LAB
ACETONE SERPL-MCNC: ABNORMAL
ALBUMIN SERPL ELPH-MCNC: 3.4 G/DL — LOW (ref 3.5–5)
ALBUMIN SERPL ELPH-MCNC: 4.3 G/DL — SIGNIFICANT CHANGE UP (ref 3.5–5)
ALP SERPL-CCNC: 113 U/L — SIGNIFICANT CHANGE UP (ref 40–120)
ALP SERPL-CCNC: 91 U/L — SIGNIFICANT CHANGE UP (ref 40–120)
ALT FLD-CCNC: 114 U/L DA — HIGH (ref 10–60)
ALT FLD-CCNC: 88 U/L DA — HIGH (ref 10–60)
ANION GAP SERPL CALC-SCNC: 12 MMOL/L — SIGNIFICANT CHANGE UP (ref 5–17)
ANION GAP SERPL CALC-SCNC: 26 MMOL/L — HIGH (ref 5–17)
AST SERPL-CCNC: 76 U/L — HIGH (ref 10–40)
AST SERPL-CCNC: 97 U/L — HIGH (ref 10–40)
BASE EXCESS BLDV CALC-SCNC: -5.4 MMOL/L — SIGNIFICANT CHANGE UP
BASOPHILS # BLD AUTO: 0.03 K/UL — SIGNIFICANT CHANGE UP (ref 0–0.2)
BASOPHILS NFR BLD AUTO: 0.4 % — SIGNIFICANT CHANGE UP (ref 0–2)
BILIRUB SERPL-MCNC: 0.7 MG/DL — SIGNIFICANT CHANGE UP (ref 0.2–1.2)
BILIRUB SERPL-MCNC: 0.9 MG/DL — SIGNIFICANT CHANGE UP (ref 0.2–1.2)
BLOOD GAS COMMENTS, VENOUS: SIGNIFICANT CHANGE UP
BUN SERPL-MCNC: 22 MG/DL — HIGH (ref 7–18)
BUN SERPL-MCNC: 25 MG/DL — HIGH (ref 7–18)
CALCIUM SERPL-MCNC: 8.3 MG/DL — LOW (ref 8.4–10.5)
CALCIUM SERPL-MCNC: 9.4 MG/DL — SIGNIFICANT CHANGE UP (ref 8.4–10.5)
CHLORIDE SERPL-SCNC: 105 MMOL/L — SIGNIFICANT CHANGE UP (ref 96–108)
CHLORIDE SERPL-SCNC: 96 MMOL/L — SIGNIFICANT CHANGE UP (ref 96–108)
CO2 SERPL-SCNC: 15 MMOL/L — LOW (ref 22–31)
CO2 SERPL-SCNC: 24 MMOL/L — SIGNIFICANT CHANGE UP (ref 22–31)
CREAT SERPL-MCNC: 0.89 MG/DL — SIGNIFICANT CHANGE UP (ref 0.5–1.3)
CREAT SERPL-MCNC: 1.37 MG/DL — HIGH (ref 0.5–1.3)
EGFR: 56 ML/MIN/1.73M2 — LOW
EGFR: 93 ML/MIN/1.73M2 — SIGNIFICANT CHANGE UP
EOSINOPHIL # BLD AUTO: 0 K/UL — SIGNIFICANT CHANGE UP (ref 0–0.5)
EOSINOPHIL NFR BLD AUTO: 0 % — SIGNIFICANT CHANGE UP (ref 0–6)
ETHANOL SERPL-MCNC: 120 MG/DL — HIGH (ref 0–10)
GLUCOSE BLDC GLUCOMTR-MCNC: 126 MG/DL — HIGH (ref 70–99)
GLUCOSE BLDC GLUCOMTR-MCNC: 131 MG/DL — HIGH (ref 70–99)
GLUCOSE SERPL-MCNC: 132 MG/DL — HIGH (ref 70–99)
GLUCOSE SERPL-MCNC: 238 MG/DL — HIGH (ref 70–99)
HCO3 BLDV-SCNC: 19 MMOL/L — LOW (ref 22–29)
HCT VFR BLD CALC: 42.7 % — SIGNIFICANT CHANGE UP (ref 39–50)
HGB BLD-MCNC: 14.6 G/DL — SIGNIFICANT CHANGE UP (ref 13–17)
IMM GRANULOCYTES NFR BLD AUTO: 0.1 % — SIGNIFICANT CHANGE UP (ref 0–1.5)
LIDOCAIN IGE QN: 45 U/L — LOW (ref 73–393)
LYMPHOCYTES # BLD AUTO: 0.88 K/UL — LOW (ref 1–3.3)
LYMPHOCYTES # BLD AUTO: 12.4 % — LOW (ref 13–44)
MAGNESIUM SERPL-MCNC: 2.2 MG/DL — SIGNIFICANT CHANGE UP (ref 1.6–2.6)
MCHC RBC-ENTMCNC: 33.5 PG — SIGNIFICANT CHANGE UP (ref 27–34)
MCHC RBC-ENTMCNC: 34.2 GM/DL — SIGNIFICANT CHANGE UP (ref 32–36)
MCV RBC AUTO: 97.9 FL — SIGNIFICANT CHANGE UP (ref 80–100)
MONOCYTES # BLD AUTO: 0.24 K/UL — SIGNIFICANT CHANGE UP (ref 0–0.9)
MONOCYTES NFR BLD AUTO: 3.4 % — SIGNIFICANT CHANGE UP (ref 2–14)
NEUTROPHILS # BLD AUTO: 5.93 K/UL — SIGNIFICANT CHANGE UP (ref 1.8–7.4)
NEUTROPHILS NFR BLD AUTO: 83.7 % — HIGH (ref 43–77)
NRBC # BLD: 0 /100 WBCS — SIGNIFICANT CHANGE UP (ref 0–0)
PCO2 BLDV: 34 MMHG — LOW (ref 42–55)
PH BLDV: 7.36 — SIGNIFICANT CHANGE UP (ref 7.32–7.43)
PHOSPHATE SERPL-MCNC: 5.6 MG/DL — HIGH (ref 2.5–4.5)
PLATELET # BLD AUTO: 259 K/UL — SIGNIFICANT CHANGE UP (ref 150–400)
PO2 BLDV: 63 MMHG — SIGNIFICANT CHANGE UP
POTASSIUM SERPL-MCNC: 4.2 MMOL/L — SIGNIFICANT CHANGE UP (ref 3.5–5.3)
POTASSIUM SERPL-MCNC: 4.6 MMOL/L — SIGNIFICANT CHANGE UP (ref 3.5–5.3)
POTASSIUM SERPL-SCNC: 4.2 MMOL/L — SIGNIFICANT CHANGE UP (ref 3.5–5.3)
POTASSIUM SERPL-SCNC: 4.6 MMOL/L — SIGNIFICANT CHANGE UP (ref 3.5–5.3)
PROT SERPL-MCNC: 7.8 G/DL — SIGNIFICANT CHANGE UP (ref 6–8.3)
PROT SERPL-MCNC: 9.4 G/DL — HIGH (ref 6–8.3)
RBC # BLD: 4.36 M/UL — SIGNIFICANT CHANGE UP (ref 4.2–5.8)
RBC # FLD: 13.9 % — SIGNIFICANT CHANGE UP (ref 10.3–14.5)
SAO2 % BLDV: 90.7 % — SIGNIFICANT CHANGE UP
SARS-COV-2 RNA SPEC QL NAA+PROBE: SIGNIFICANT CHANGE UP
SODIUM SERPL-SCNC: 137 MMOL/L — SIGNIFICANT CHANGE UP (ref 135–145)
SODIUM SERPL-SCNC: 141 MMOL/L — SIGNIFICANT CHANGE UP (ref 135–145)
WBC # BLD: 7.09 K/UL — SIGNIFICANT CHANGE UP (ref 3.8–10.5)
WBC # FLD AUTO: 7.09 K/UL — SIGNIFICANT CHANGE UP (ref 3.8–10.5)

## 2022-03-13 PROCEDURE — 71045 X-RAY EXAM CHEST 1 VIEW: CPT | Mod: 26

## 2022-03-13 PROCEDURE — 99291 CRITICAL CARE FIRST HOUR: CPT

## 2022-03-13 PROCEDURE — 74177 CT ABD & PELVIS W/CONTRAST: CPT | Mod: 26,MA

## 2022-03-13 PROCEDURE — 93010 ELECTROCARDIOGRAM REPORT: CPT

## 2022-03-13 RX ORDER — INSULIN HUMAN 100 [IU]/ML
6 INJECTION, SOLUTION SUBCUTANEOUS ONCE
Refills: 0 | Status: COMPLETED | OUTPATIENT
Start: 2022-03-13 | End: 2022-03-13

## 2022-03-13 RX ORDER — THIAMINE MONONITRATE (VIT B1) 100 MG
100 TABLET ORAL DAILY
Refills: 0 | Status: DISCONTINUED | OUTPATIENT
Start: 2022-03-13 | End: 2022-03-13

## 2022-03-13 RX ORDER — THIAMINE MONONITRATE (VIT B1) 100 MG
500 TABLET ORAL EVERY 8 HOURS
Refills: 0 | Status: COMPLETED | OUTPATIENT
Start: 2022-03-13 | End: 2022-03-16

## 2022-03-13 RX ORDER — PREGABALIN 225 MG/1
1000 CAPSULE ORAL DAILY
Refills: 0 | Status: DISCONTINUED | OUTPATIENT
Start: 2022-03-13 | End: 2022-03-16

## 2022-03-13 RX ORDER — DEXTROSE 50 % IN WATER 50 %
15 SYRINGE (ML) INTRAVENOUS ONCE
Refills: 0 | Status: DISCONTINUED | OUTPATIENT
Start: 2022-03-13 | End: 2022-03-16

## 2022-03-13 RX ORDER — ONDANSETRON 8 MG/1
8 TABLET, FILM COATED ORAL
Refills: 0 | Status: DISCONTINUED | OUTPATIENT
Start: 2022-03-13 | End: 2022-03-15

## 2022-03-13 RX ORDER — FOLIC ACID 0.8 MG
1 TABLET ORAL DAILY
Refills: 0 | Status: DISCONTINUED | OUTPATIENT
Start: 2022-03-13 | End: 2022-03-16

## 2022-03-13 RX ORDER — SODIUM CHLORIDE 9 MG/ML
1000 INJECTION INTRAMUSCULAR; INTRAVENOUS; SUBCUTANEOUS
Refills: 0 | Status: DISCONTINUED | OUTPATIENT
Start: 2022-03-13 | End: 2022-03-16

## 2022-03-13 RX ORDER — ONDANSETRON 8 MG/1
4 TABLET, FILM COATED ORAL ONCE
Refills: 0 | Status: COMPLETED | OUTPATIENT
Start: 2022-03-13 | End: 2022-03-13

## 2022-03-13 RX ORDER — DEXTROSE 50 % IN WATER 50 %
12.5 SYRINGE (ML) INTRAVENOUS ONCE
Refills: 0 | Status: DISCONTINUED | OUTPATIENT
Start: 2022-03-13 | End: 2022-03-16

## 2022-03-13 RX ORDER — THIAMINE MONONITRATE (VIT B1) 100 MG
100 TABLET ORAL ONCE
Refills: 0 | Status: DISCONTINUED | OUTPATIENT
Start: 2022-03-13 | End: 2022-03-13

## 2022-03-13 RX ORDER — PANTOPRAZOLE SODIUM 20 MG/1
40 TABLET, DELAYED RELEASE ORAL DAILY
Refills: 0 | Status: DISCONTINUED | OUTPATIENT
Start: 2022-03-13 | End: 2022-03-16

## 2022-03-13 RX ORDER — SODIUM CHLORIDE 9 MG/ML
1000 INJECTION, SOLUTION INTRAVENOUS
Refills: 0 | Status: DISCONTINUED | OUTPATIENT
Start: 2022-03-13 | End: 2022-03-16

## 2022-03-13 RX ORDER — DEXTROSE 50 % IN WATER 50 %
25 SYRINGE (ML) INTRAVENOUS ONCE
Refills: 0 | Status: DISCONTINUED | OUTPATIENT
Start: 2022-03-13 | End: 2022-03-16

## 2022-03-13 RX ORDER — FAMOTIDINE 10 MG/ML
20 INJECTION INTRAVENOUS ONCE
Refills: 0 | Status: COMPLETED | OUTPATIENT
Start: 2022-03-13 | End: 2022-03-13

## 2022-03-13 RX ORDER — INSULIN GLARGINE 100 [IU]/ML
10 INJECTION, SOLUTION SUBCUTANEOUS AT BEDTIME
Refills: 0 | Status: DISCONTINUED | OUTPATIENT
Start: 2022-03-13 | End: 2022-03-16

## 2022-03-13 RX ORDER — MULTIVIT-MIN/FERROUS GLUCONATE 9 MG/15 ML
1 LIQUID (ML) ORAL DAILY
Refills: 0 | Status: DISCONTINUED | OUTPATIENT
Start: 2022-03-13 | End: 2022-03-13

## 2022-03-13 RX ORDER — THIAMINE MONONITRATE (VIT B1) 100 MG
500 TABLET ORAL ONCE
Refills: 0 | Status: COMPLETED | OUTPATIENT
Start: 2022-03-13 | End: 2022-03-13

## 2022-03-13 RX ORDER — CIPROFLOXACIN LACTATE 400MG/40ML
200 VIAL (ML) INTRAVENOUS EVERY 12 HOURS
Refills: 0 | Status: DISCONTINUED | OUTPATIENT
Start: 2022-03-13 | End: 2022-03-16

## 2022-03-13 RX ORDER — SODIUM CHLORIDE 9 MG/ML
1000 INJECTION INTRAMUSCULAR; INTRAVENOUS; SUBCUTANEOUS ONCE
Refills: 0 | Status: COMPLETED | OUTPATIENT
Start: 2022-03-13 | End: 2022-03-13

## 2022-03-13 RX ORDER — ENOXAPARIN SODIUM 100 MG/ML
40 INJECTION SUBCUTANEOUS EVERY 24 HOURS
Refills: 0 | Status: DISCONTINUED | OUTPATIENT
Start: 2022-03-13 | End: 2022-03-16

## 2022-03-13 RX ORDER — GLUCAGON INJECTION, SOLUTION 0.5 MG/.1ML
1 INJECTION, SOLUTION SUBCUTANEOUS ONCE
Refills: 0 | Status: DISCONTINUED | OUTPATIENT
Start: 2022-03-13 | End: 2022-03-16

## 2022-03-13 RX ADMIN — INSULIN GLARGINE 10 UNIT(S): 100 INJECTION, SOLUTION SUBCUTANEOUS at 22:53

## 2022-03-13 RX ADMIN — Medication 2 MILLIGRAM(S): at 11:19

## 2022-03-13 RX ADMIN — Medication 105 MILLIGRAM(S): at 15:45

## 2022-03-13 RX ADMIN — SODIUM CHLORIDE 1000 MILLILITER(S): 9 INJECTION INTRAMUSCULAR; INTRAVENOUS; SUBCUTANEOUS at 11:21

## 2022-03-13 RX ADMIN — FAMOTIDINE 20 MILLIGRAM(S): 10 INJECTION INTRAVENOUS at 15:46

## 2022-03-13 RX ADMIN — Medication 2 MILLIGRAM(S): at 11:33

## 2022-03-13 RX ADMIN — ENOXAPARIN SODIUM 40 MILLIGRAM(S): 100 INJECTION SUBCUTANEOUS at 22:35

## 2022-03-13 RX ADMIN — ONDANSETRON 8 MILLIGRAM(S): 8 TABLET, FILM COATED ORAL at 17:59

## 2022-03-13 RX ADMIN — ONDANSETRON 4 MILLIGRAM(S): 8 TABLET, FILM COATED ORAL at 11:18

## 2022-03-13 RX ADMIN — Medication 105 MILLIGRAM(S): at 22:35

## 2022-03-13 RX ADMIN — SODIUM CHLORIDE 100 MILLILITER(S): 9 INJECTION INTRAMUSCULAR; INTRAVENOUS; SUBCUTANEOUS at 22:54

## 2022-03-13 RX ADMIN — Medication 2 MILLIGRAM(S): at 18:00

## 2022-03-13 RX ADMIN — Medication 2 MILLIGRAM(S): at 22:34

## 2022-03-13 RX ADMIN — INSULIN HUMAN 6 UNIT(S): 100 INJECTION, SOLUTION SUBCUTANEOUS at 16:27

## 2022-03-13 RX ADMIN — Medication 100 MILLIGRAM(S): at 18:00

## 2022-03-13 NOTE — CONSULT NOTE ADULT - SUBJECTIVE AND OBJECTIVE BOX
Patient is a 66y old  Male who presents with a chief complaint of     Initial HPI on admission:  HPI:      BRIEF HOSPITAL COURSE: ***    PAST MEDICAL & SURGICAL HISTORY:  EtOH dependence    Diabetes    HTN (hypertension)    No significant past surgical history      Allergies    No Known Allergies    Intolerances      FAMILY HISTORY:  FH: diabetes mellitus (Sibling)            Medications:  insulin regular  human recombinant 6 Unit(s) IV Push once      vent settings      Vital Signs Last 24 Hrs  T(C): 37 (13 Mar 2022 15:47), Max: 37 (13 Mar 2022 15:47)  T(F): 98.6 (13 Mar 2022 15:47), Max: 98.6 (13 Mar 2022 15:47)  HR: 110 (13 Mar 2022 15:47) (106 - 119)  BP: 160/92 (13 Mar 2022 15:47) (147/86 - 169/85)  BP(mean): --  RR: 18 (13 Mar 2022 15:47) (17 - 21)  SpO2: 95% (13 Mar 2022 15:47) (95% - 100%)              LABS:                        14.6   7.09  )-----------( 259      ( 13 Mar 2022 10:57 )             42.7     03-13    137  |  96  |  25<H>  ----------------------------<  238<H>  4.6   |  15<L>  |  1.37<H>    Ca    9.4      13 Mar 2022 10:57  Phos  5.6     03-13  Mg     2.2     03-13    TPro  9.4<H>  /  Alb  4.3  /  TBili  0.7  /  DBili  0.2  /  AST  97<H>  /  ALT  114<H>  /  AlkPhos  113  03-13          CAPILLARY BLOOD GLUCOSE      POCT Blood Glucose.: 250 mg/dL (13 Mar 2022 10:32)        CULTURES:        Physical Examination:    >>>      RADIOLOGY REVIEWED ***          x    Skin/Catheters  -    FEN  -    Prophylaxis   -     Patient is a 66y old  Male who presents with a chief complaint of     Initial HPI on admission:  HPI: 66 year old male with medical history DM, Alcohol abuse presented to ed with complains of nausea and vomiting for past 2 days . Patient has some epigastric discomfort but denies abdominal pain . Patient states his last drink was last morning and he usually takes 18 units of insulin at night and 8 units pre meals, Patient states he usually takes insulin but didnt take them yesterday due to vomiting. Patient denies fever, diarrhea, abdominal pain, chest pain or plapitations.      ER course: Patient was admitted for Alcohol abuse with Anion gap metabolic acidosis  likely due  starvation and or DKA. Patient received 2 lr bolus in ED and 4 mg of Ativan   Patient is calm at the time of exam.    PAST MEDICAL & SURGICAL HISTORY:  EtOH dependence    Diabetes    HTN (hypertension)    No significant past surgical history      Allergies    No Known Allergies    Intolerances      FAMILY HISTORY:  FH: diabetes mellitus (Sibling)            Medications:  insulin regular  human recombinant 6 Unit(s) IV Push once      vent settings      Vital Signs Last 24 Hrs  T(C): 37 (13 Mar 2022 15:47), Max: 37 (13 Mar 2022 15:47)  T(F): 98.6 (13 Mar 2022 15:47), Max: 98.6 (13 Mar 2022 15:47)  HR: 110 (13 Mar 2022 15:47) (106 - 119)  BP: 160/92 (13 Mar 2022 15:47) (147/86 - 169/85)  BP(mean): --  RR: 18 (13 Mar 2022 15:47) (17 - 21)  SpO2: 95% (13 Mar 2022 15:47) (95% - 100%)              LABS:                        14.6   7.09  )-----------( 259      ( 13 Mar 2022 10:57 )             42.7     03-13    137  |  96  |  25<H>  ----------------------------<  238<H>  4.6   |  15<L>  |  1.37<H>    Ca    9.4      13 Mar 2022 10:57  Phos  5.6     03-13  Mg     2.2     03-13    TPro  9.4<H>  /  Alb  4.3  /  TBili  0.7  /  DBili  0.2  /  AST  97<H>  /  ALT  114<H>  /  AlkPhos  113  03-13          CAPILLARY BLOOD GLUCOSE      POCT Blood Glucose.: 250 mg/dL (13 Mar 2022 10:32)        CULTURES:        Physical Examination:          RADIOLOGY REVIEWED ***          x    Skin/Catheters  -    FEN  -    Prophylaxis   -     Patient is a 66y old  Male who presents with a chief complaint of     Initial HPI on admission:  HPI: 66 year old male with medical history DM, Alcohol abuse presented to ed with complains of nausea and vomiting for past 2 days . Patient has some epigastric discomfort but denies abdominal pain . Patient states his last drink was last morning and he usually takes 18 units of insulin at night and 8 units pre meals, Patient states he usually takes insulin but didnt take them yesterday due to vomiting. Patient denies fever, diarrhea, abdominal pain, chest pain or plapitations.      ER course: Patient was admitted for Alcohol abuse with Anion gap metabolic acidosis  likely due  starvation and or DKA. Patient received 2 lr bolus in ED and 4 mg of Ativan   Patient is calm at the time of exam.    PAST MEDICAL & SURGICAL HISTORY:  EtOH dependence    Diabetes    HTN (hypertension)    No significant past surgical history      Allergies    No Known Allergies    Intolerances      FAMILY HISTORY:  FH: diabetes mellitus (Sibling)            Medications:  insulin regular  human recombinant 6 Unit(s) IV Push once      vent settings      Vital Signs Last 24 Hrs  T(C): 37 (13 Mar 2022 15:47), Max: 37 (13 Mar 2022 15:47)  T(F): 98.6 (13 Mar 2022 15:47), Max: 98.6 (13 Mar 2022 15:47)  HR: 110 (13 Mar 2022 15:47) (106 - 119)  BP: 160/92 (13 Mar 2022 15:47) (147/86 - 169/85)  BP(mean): --  RR: 18 (13 Mar 2022 15:47) (17 - 21)  SpO2: 95% (13 Mar 2022 15:47) (95% - 100%)              LABS:                        14.6   7.09  )-----------( 259      ( 13 Mar 2022 10:57 )             42.7     03-13    137  |  96  |  25<H>  ----------------------------<  238<H>  4.6   |  15<L>  |  1.37<H>    Ca    9.4      13 Mar 2022 10:57  Phos  5.6     03-13  Mg     2.2     03-13    TPro  9.4<H>  /  Alb  4.3  /  TBili  0.7  /  DBili  0.2  /  AST  97<H>  /  ALT  114<H>  /  AlkPhos  113  03-13          CAPILLARY BLOOD GLUCOSE      POCT Blood Glucose.: 250 mg/dL (13 Mar 2022 10:32)        CULTURES:        Physical Examination:    GENERAL: NAD, speaks in full sentences, no signs of respiratory distress  HEAD:  Atraumatic, Normocephalic  EYES: EOMI, PERRLA, conjunctiva and sclera clear  NECK: Supple, No JVD  CHEST/LUNG: Clear to auscultation bilaterally; No wheeze; No crackles; No accessory muscles used  HEART: Regular rate and rhythm; No murmurs;   ABDOMEN: Soft, Nontender, Nondistended; Bowel sounds present; No guarding  EXTREMITIES:  2+ Peripheral Pulses, No cyanosis or edema  PSYCH:  Normal Affect  NEUROLOGY: non-focal, AAO X 3. Strength is 5/5. no sensory loss.  SKIN: No rashes or lesions      RADIOLOGY REVIEWED ***          x    Skin/Catheters  -    FEN  -    Prophylaxis   -

## 2022-03-13 NOTE — H&P ADULT - PROBLEM SELECTOR PLAN 1
Patient coming to ED c/o of vomitting. Unable to eat or drink without vomiting. Denies any blood in vomit.  In ED received Zofran, 1L NS.   -Keep patient NPO  -Zofran for nausea   -c/w IVF 100cc/hr   -Protonix IV   -follow up BMP for electrolyte derangements due to vomiting. Patient coming to ED c/o of vomitting. Unable to eat or drink without vomiting. Denies any blood in vomit.  -CT abdomen showing esophageal wall thickening.   In ED received Zofran, 1L NS.   -Keep patient NPO  -Zofran for nausea   -c/w IVF 100cc/hr   -Protonix IV   -follow up BMP for electrolyte derangements due to vomiting.  -Dr. Sanabria consulted  -Ciprofloxacin for esophagitis

## 2022-03-13 NOTE — H&P ADULT - HISTORY OF PRESENT ILLNESS
66 year old male with a past medical history of HTN, DM, and alcohol use disorder coming to ED for vomit ting Patient states that he started having multiple episodes of non bloody vomiting that started yesterday morning. He describes the color of the vomit as "wine color" but denies any blood. He has been unable to eat or drink anything without vomiting it out. He states that this has happened to him in the past. He is also complaining of epigastric pain that is provoked by vomiting. Patient also states that he fell after slipping on the stairs but did not lose consciousness. He states that he drinks either 1 bottle of bourbon or 2 beers occasionally Denies drinking alcohol everyday. He is also endorsing seeing a robert tree in his house even though there is no robert tree. Denies any auditory hallucinations. Denies any headaches, dizziness, fevers, chills, cough, chest pain, SOB, hematuria, dysuria, rashes, and weakness.    In ED VS: T 98.2, , /72, RR 18, Spo2 95% RA.  HCO3 15, AG 26, Cr 1.36, glucose 238, AST 97, , Phosphorus 5.6,   CT Abd: distal esophageal wall thickening, Chest xray negative.   Received 4mg Ativan in ED, 1L bolus NS, and thiamine

## 2022-03-13 NOTE — ED ADULT NURSE REASSESSMENT NOTE - NS ED NURSE REASSESS COMMENT FT1
Pt is alert and oriented x3. CIWA completed. No distress noted. IV fluid is infusing per order. Per MD pt is transferred to medicine service. Safety precaution maintained.

## 2022-03-13 NOTE — ED PROVIDER NOTE - PROGRESS NOTE DETAILS
vomiting stopped, still tachycardic. patient noted he had some hallucinations of animals yesterday. Not today. Patient knows he is in a hospital. Awake. ICU consulted. downgraded to telemetry.

## 2022-03-13 NOTE — CONSULT NOTE ADULT - ATTENDING COMMENTS
66 year old male with medical history DM, Alcohol abuse presented to ed with complains of nausea and vomiting for past 2 days . Patient has some epigastric discomfort but denies abdominal pain     AssessmentL    -Alcohol abuse  -Anion gap metabolic acidosis  -Starvation ketosis  -Acute renal failure  -Transaminitis  -esophagitis   -Diverticulitis+- colitis    Plan   - Continue ativan   - Folate / thiamine   - IVF   - Monitor renal fx  - Antibx for colitis   - Continue care as per primary team

## 2022-03-13 NOTE — H&P ADULT - PROBLEM SELECTOR PLAN 2
Patient has hx of alcohol use disorder. States that he drinks one bottle of bourbon or 2 bottles of beer every other night.   In ED, patient tachycardic, visible tremor on arm extension, and tachycardia.  -   CIWA 16  c/w CIWA protocol   c/w folate, thiamine, and MVI.   SW consult for Etoh use disorder  Keep NPO as patient unable to tolerate PO Patient has hx of alcohol use disorder. States that he drinks one bottle of bourbon or 2 bottles of beer every other night.   In ED, patient tachycardic, visible tremor on arm extension, and tachycardia.  -   CIWA 16  c/w CIWA protocol   -Ativan master order. Starting with 2mg q4h for 6 doses   c/w folate, thiamine, and MVI.   SW consult for Etoh use disorder  Keep NPO as patient unable to tolerate PO

## 2022-03-13 NOTE — ED ADULT NURSE NOTE - NSIMPLEMENTINTERV_GEN_ALL_ED
Implemented All Fall Risk Interventions:  [place] to call system. Call bell, personal items and telephone within reach. Instruct patient to call for assistance. Room bathroom lighting operational. Non-slip footwear when patient is off stretcher. Physically safe environment: no spills, clutter or unnecessary equipment. Stretcher in lowest position, wheels locked, appropriate side rails in place. Provide visual cue, wrist band, yellow gown, etc. Monitor gait and stability. Monitor for mental status changes and reorient to person, place, and time. Review medications for side effects contributing to fall risk. Reinforce activity limits and safety measures with patient and family.

## 2022-03-13 NOTE — CONSULT NOTE ADULT - ASSESSMENT
66 year old male with medical history DM, Alcohol abuse presented to ed with complains of nausea and vomiting for past 2 days . Patient has some epigastric discomfort but denies abdominal pain . 66 year old male with medical history DM, Alcohol abuse presented to ed with complains of nausea and vomiting for past 2 days . Patient has some epigastric discomfort but denies abdominal pain     AssessmentL    -Alcohol abuse  -Anion gap metabolic acidosis  -Starvation ketosis  -Acute renal failure  -Transaminitis  -esophagitis   -Diverticulitis+- colitis    Neuro:    Patient with Alcohol abuse.    continue with ciwa protocol  ativan 2 mg q4 standing , 2mg q2 prn      Cardio:  monitor Blood pressure and HR    Pulm:  no issues    Renal:  Patient with High Anion gap metabolic acidosis  likely due to starvation ketosis  s/p fluids and thiamine in ED. follow repeat BMP.  VBG noted.    VIVI:  likely due to dehydration . pre renal.  s/p 2 Lr Bolus.  monitor Bun/creatinine    ID:  consider cipro and flagyl for diverticulitis +-esophagitis.      Heme:  no issues    Prophylactic meausure:  heparin for dvt ppx.

## 2022-03-13 NOTE — ED ADULT NURSE NOTE - OBJECTIVE STATEMENT
Pt BIB EMS for alcohol abuse, pt c/o vomiting x 2 days. No acute distress noted, denies chest pain, no SOB noted.

## 2022-03-13 NOTE — ED PROVIDER NOTE - CARE PLAN
Principal Discharge DX:	Alcohol withdrawal  Secondary Diagnosis:	Intractable nausea and vomiting   1

## 2022-03-13 NOTE — H&P ADULT - PROBLEM SELECTOR PLAN 3
Likely due to Alcohol Ketoacidosis or starvation ketoacidosis  patient received 1L NS Bolus in ED.   HCO3 15  AG 26   Moderate Acetone in serum  f/u repeat BMP

## 2022-03-13 NOTE — H&P ADULT - PROBLEM SELECTOR PLAN 6
Patient takes 18u at night at 8u TID with meals  Did not take yesterday due to n/v.   Will place on 10u at bedtime for now  patient is NPO  Add basal insulin when patient able to tolerate PO  f/u Hba1c

## 2022-03-13 NOTE — PATIENT PROFILE ADULT - FALL HARM RISK - HARM RISK INTERVENTIONS
Assistance with ambulation/Assistance OOB with selected safe patient handling equipment/Communicate Risk of Fall with Harm to all staff/Monitor for mental status changes/Monitor gait and stability/Reinforce activity limits and safety measures with patient and family/Tailored Fall Risk Interventions/Toileting schedule using arm’s reach rule for commode and bathroom/Use of alarms - bed, chair and/or voice tab/Visual Cue: Yellow wristband and red socks/Bed in lowest position, wheels locked, appropriate side rails in place/Call bell, personal items and telephone in reach/Instruct patient to call for assistance before getting out of bed or chair/Non-slip footwear when patient is out of bed/[place] to call system/Physically safe environment - no spills, clutter or unnecessary equipment/Purposeful Proactive Rounding/Room/bathroom lighting operational, light cord in reach

## 2022-03-13 NOTE — ED PROVIDER NOTE - OBJECTIVE STATEMENT
PT with alcohol abuse here with 2 days of vomiting unable to keep anything down. No alcohol use for same time as unable to tolerate PO. no diarrhea.

## 2022-03-13 NOTE — ED ADULT TRIAGE NOTE - CHIEF COMPLAINT QUOTE
BIBA found in street shaking and vomiting. As per patient he didn't drink for 2 days and had a beer this morning

## 2022-03-13 NOTE — H&P ADULT - ATTENDING COMMENTS
Pt presenting with strong DT symptomology. Started on IV Vitamin (Thiamine in particular) & IV fluid support, in addition to routine dosing of IV ativan (w/ CIWA as back up for break through DT). ON PPI ? Cipro for esophagitis. Will monitor LFTs/ Lipase.

## 2022-03-13 NOTE — H&P ADULT - ASSESSMENT
66 year old male with a past medical history of HTN, DM, and Etoh use disorder coming to ED c/o of vomiting and epigastric pain admitted for alcohol withdrawal symptoms vs intractable vomiting

## 2022-03-13 NOTE — ED PROVIDER NOTE - CPE EDP NEURO NORM
"-- DO NOT REPLY / DO NOT REPLY ALL --  -- Message is from the [facility]--    General Patient Message      Reason for Call: patient has a follow up appt scheduled for Wednesday 2/19/2020 and would like to verify if he should fast for blood work. Please contact patient as soon as possible. Telephone number: [phone] Information       Type Contact Phone    02/17/2020 02:18 PM Phone (Incoming) Yoandy Sanchez (Self) [phone] (M)          Alternative phone number: none    Turnaround time given to caller: ""This message will be sent to [facility] Provider's name]. The clinical team will fulfill your request as soon as they review your message. \""}    " normal...

## 2022-03-14 DIAGNOSIS — Z02.9 ENCOUNTER FOR ADMINISTRATIVE EXAMINATIONS, UNSPECIFIED: ICD-10-CM

## 2022-03-14 LAB
ALBUMIN SERPL ELPH-MCNC: 3.1 G/DL — LOW (ref 3.5–5)
ALP SERPL-CCNC: 83 U/L — SIGNIFICANT CHANGE UP (ref 40–120)
ALT FLD-CCNC: 74 U/L DA — HIGH (ref 10–60)
ANION GAP SERPL CALC-SCNC: 6 MMOL/L — SIGNIFICANT CHANGE UP (ref 5–17)
AST SERPL-CCNC: 67 U/L — HIGH (ref 10–40)
BILIRUB SERPL-MCNC: 1.1 MG/DL — SIGNIFICANT CHANGE UP (ref 0.2–1.2)
BUN SERPL-MCNC: 25 MG/DL — HIGH (ref 7–18)
CALCIUM SERPL-MCNC: 8.3 MG/DL — LOW (ref 8.4–10.5)
CHLORIDE SERPL-SCNC: 106 MMOL/L — SIGNIFICANT CHANGE UP (ref 96–108)
CO2 SERPL-SCNC: 28 MMOL/L — SIGNIFICANT CHANGE UP (ref 22–31)
CREAT SERPL-MCNC: 0.96 MG/DL — SIGNIFICANT CHANGE UP (ref 0.5–1.3)
EGFR: 86 ML/MIN/1.73M2 — SIGNIFICANT CHANGE UP
GLUCOSE BLDC GLUCOMTR-MCNC: 108 MG/DL — HIGH (ref 70–99)
GLUCOSE BLDC GLUCOMTR-MCNC: 119 MG/DL — HIGH (ref 70–99)
GLUCOSE BLDC GLUCOMTR-MCNC: 143 MG/DL — HIGH (ref 70–99)
GLUCOSE BLDC GLUCOMTR-MCNC: 150 MG/DL — HIGH (ref 70–99)
GLUCOSE SERPL-MCNC: 165 MG/DL — HIGH (ref 70–99)
HCT VFR BLD CALC: 34.4 % — LOW (ref 39–50)
HGB BLD-MCNC: 11.7 G/DL — LOW (ref 13–17)
MAGNESIUM SERPL-MCNC: 2.2 MG/DL — SIGNIFICANT CHANGE UP (ref 1.6–2.6)
MCHC RBC-ENTMCNC: 33.3 PG — SIGNIFICANT CHANGE UP (ref 27–34)
MCHC RBC-ENTMCNC: 34 GM/DL — SIGNIFICANT CHANGE UP (ref 32–36)
MCV RBC AUTO: 98 FL — SIGNIFICANT CHANGE UP (ref 80–100)
NRBC # BLD: 0 /100 WBCS — SIGNIFICANT CHANGE UP (ref 0–0)
PHOSPHATE SERPL-MCNC: 1.9 MG/DL — LOW (ref 2.5–4.5)
PLATELET # BLD AUTO: 150 K/UL — SIGNIFICANT CHANGE UP (ref 150–400)
POTASSIUM SERPL-MCNC: 3.9 MMOL/L — SIGNIFICANT CHANGE UP (ref 3.5–5.3)
POTASSIUM SERPL-SCNC: 3.9 MMOL/L — SIGNIFICANT CHANGE UP (ref 3.5–5.3)
PROT SERPL-MCNC: 7.1 G/DL — SIGNIFICANT CHANGE UP (ref 6–8.3)
RBC # BLD: 3.51 M/UL — LOW (ref 4.2–5.8)
RBC # FLD: 13.9 % — SIGNIFICANT CHANGE UP (ref 10.3–14.5)
SODIUM SERPL-SCNC: 140 MMOL/L — SIGNIFICANT CHANGE UP (ref 135–145)
WBC # BLD: 5.14 K/UL — SIGNIFICANT CHANGE UP (ref 3.8–10.5)
WBC # FLD AUTO: 5.14 K/UL — SIGNIFICANT CHANGE UP (ref 3.8–10.5)

## 2022-03-14 RX ORDER — INFLUENZA VIRUS VACCINE 15; 15; 15; 15 UG/.5ML; UG/.5ML; UG/.5ML; UG/.5ML
0.7 SUSPENSION INTRAMUSCULAR ONCE
Refills: 0 | Status: DISCONTINUED | OUTPATIENT
Start: 2022-03-14 | End: 2022-03-16

## 2022-03-14 RX ADMIN — Medication 2 MILLIGRAM(S): at 05:12

## 2022-03-14 RX ADMIN — PREGABALIN 1000 MICROGRAM(S): 225 CAPSULE ORAL at 13:40

## 2022-03-14 RX ADMIN — Medication 1.5 MILLIGRAM(S): at 22:43

## 2022-03-14 RX ADMIN — Medication 1.5 MILLIGRAM(S): at 17:49

## 2022-03-14 RX ADMIN — Medication 1.5 MILLIGRAM(S): at 15:09

## 2022-03-14 RX ADMIN — Medication 1 TABLET(S): at 13:40

## 2022-03-14 RX ADMIN — Medication 1 MILLIGRAM(S): at 13:41

## 2022-03-14 RX ADMIN — Medication 105 MILLIGRAM(S): at 05:10

## 2022-03-14 RX ADMIN — PANTOPRAZOLE SODIUM 40 MILLIGRAM(S): 20 TABLET, DELAYED RELEASE ORAL at 13:40

## 2022-03-14 RX ADMIN — Medication 105 MILLIGRAM(S): at 15:09

## 2022-03-14 RX ADMIN — Medication 100 MILLIGRAM(S): at 05:11

## 2022-03-14 RX ADMIN — INSULIN GLARGINE 10 UNIT(S): 100 INJECTION, SOLUTION SUBCUTANEOUS at 22:42

## 2022-03-14 RX ADMIN — ONDANSETRON 8 MILLIGRAM(S): 8 TABLET, FILM COATED ORAL at 05:11

## 2022-03-14 RX ADMIN — ENOXAPARIN SODIUM 40 MILLIGRAM(S): 100 INJECTION SUBCUTANEOUS at 22:42

## 2022-03-14 RX ADMIN — Medication 100 MILLIGRAM(S): at 17:48

## 2022-03-14 RX ADMIN — Medication 105 MILLIGRAM(S): at 22:47

## 2022-03-14 RX ADMIN — ONDANSETRON 8 MILLIGRAM(S): 8 TABLET, FILM COATED ORAL at 17:48

## 2022-03-14 NOTE — SBIRT NOTE ADULT - NSSBIRTDRGPOSREINDET_GEN_A_CORE
Patient does not use drugs other than those prescribed for medical reasons. Positive reinforcement provided to patient for abstaining from drug use.

## 2022-03-14 NOTE — CONSULT NOTE ADULT - GASTROINTESTINAL DETAILS
soft/nontender/no distention/no masses palpable/no rebound tenderness/no guarding/no rigidity/no organomegaly

## 2022-03-14 NOTE — SBIRT NOTE ADULT - NSSBIRTBRIEFINTDET_GEN_A_CORE
Patient endorses that he drinks alcohol but has not had a drink since Tuesday prior to coming to the hospital. Patient reports that he usually drinks tequila mixed with Coca-Cola. Patient states that he drinks three of the “little bottles” of tequila when he does drink. He states that he has not been drinking heavily for the past 2-3 months, but has had a little alcohol here and there. Patient reports that he does not experience blackouts or episodes where he does not remember what happens when he drinks. Patient denies ever being in treatment for alcohol use in the past. Patient declined treatment resources for alcohol use.

## 2022-03-14 NOTE — CONSULT NOTE ADULT - ASSESSMENT
A. Abdomina; pain and vomiting in this patient is most likely due to:  1. Alcoholic gastritis  2. Peptic ulcer disease  3. Gastroparesis    B. Thickened distal esophagus most likely due to:  1. Esophagitis  2. Esophageal ulcer    Suggestions:    1. Protonix 40mg Po daily  2. Anti reflux measure  3. DT's precaution  4. Avoid NSAID  5. DVT prophylaxis

## 2022-03-14 NOTE — CONSULT NOTE ADULT - SUBJECTIVE AND OBJECTIVE BOX
[  ] STAT REQUEST              [ X ] ROUTINE REQUEST    Patient is a 66 year old male with abdominal pain and vomiting. GI consulted to evaluate.      HPI:  66 year old male with a past medical history of HTN, DM, and alcohol use disorder coming to ED for vomit ting Patient states that he started having multiple episodes of non bloody vomiting that started yesterday morning. He describes the color of the vomit as "wine color" but denies any blood. He has been unable to eat or drink anything without vomiting it out. He states that this has happened to him in the past. He is also complaining of epigastric pain that is provoked by vomiting. Patient also states that he fell after slipping on the stairs but did not lose consciousness. He states that he drinks either 1 bottle of bourbon or 2 beers occasionally Denies drinking alcohol everyday. He is also endorsing seeing a robert tree in his house even though there is no robert tree. Denies any auditory hallucinations. Denies any headaches, dizziness, fevers, chills, cough, chest pain, SOB, hematuria, dysuria, rashes, and weakness.       PAIN MANAGEMENT:  Pain Scale:                 0/10  Pain Location:      Prior Colonoscopy:  No prior colonoscopy    PAST MEDICAL HISTORY  EtOH dependence  Diabetes  HTN (hypertension)        PAST SURGICAL HISTORY  No significant past surgical history        Allergies    No Known Allergies    Intolerances  None         MEDICATIONS  (STANDING):  ciprofloxacin   IVPB 200 milliGRAM(s) IV Intermittent every 12 hours  cyanocobalamin 1000 MICROGram(s) Oral daily  dextrose 40% Gel 15 Gram(s) Oral once  dextrose 5%. 1000 milliLiter(s) (50 mL/Hr) IV Continuous <Continuous>  dextrose 5%. 1000 milliLiter(s) (100 mL/Hr) IV Continuous <Continuous>  dextrose 50% Injectable 25 Gram(s) IV Push once  dextrose 50% Injectable 12.5 Gram(s) IV Push once  dextrose 50% Injectable 25 Gram(s) IV Push once  enoxaparin Injectable 40 milliGRAM(s) SubCutaneous every 24 hours  folic acid 1 milliGRAM(s) Oral daily  glucagon  Injectable 1 milliGRAM(s) IntraMuscular once  influenza  Vaccine (HIGH DOSE) 0.7 milliLiter(s) IntraMuscular once  insulin glargine Injectable (LANTUS) 10 Unit(s) SubCutaneous at bedtime  LORazepam   Injectable 1.5 milliGRAM(s) IV Push every 4 hours  LORazepam   Injectable   IV Push   multivitamin 1 Tablet(s) Oral daily  ondansetron Injectable 8 milliGRAM(s) IV Push two times a day  pantoprazole  Injectable 40 milliGRAM(s) IV Push daily  sodium chloride 0.9%. 1000 milliLiter(s) (100 mL/Hr) IV Continuous <Continuous>  thiamine IVPB 500 milliGRAM(s) IV Intermittent every 8 hours    MEDICATIONS  (PRN):  LORazepam   Injectable 2 milliGRAM(s) IV Push every 2 hours PRN Symptom-triggered: each CIWA -Ar score 8 or GREATER      SOCIAL HISTORY  Advanced Directives:       [ X ] Full Code       [  ] DNR  Marital Status:         [  ] M      [ X ] S      [  ] D       [  ] W  Children:       [ X ] Yes      [  ] No  Occupation:        [  ] Employed       [ X ] Unemployed       [  ] Retired  Diet:       [X  ] Regular       [  ] PEG feeding          [  ] NG tube feeding  Drug Use:           [ X ] Patient denied          [  ] Yes  Alcohol:           [ X ] No             [  ] Yes (socially)         [  ] Yes (chronic)  Tobacco:           [  ] Yes           [ X ] No      FAMILY HISTORY  [ X ] Heart Disease            [X  ] Diabetes             [ X ] HTN             [  ] Colon Cancer             [  ] Stomach Cancer              [  ] Pancreatic Cancer      VITAL SIGNS   Vital Signs Last 24 Hrs  T(C): 37 (14 Mar 2022 14:47), Max: 37 (14 Mar 2022 14:47)  T(F): 98.6 (14 Mar 2022 14:47), Max: 98.6 (14 Mar 2022 14:47)  HR: 94 (14 Mar 2022 14:47) (94 - 105)  BP: 130/72 (14 Mar 2022 14:47) (130/72 - 152/78)   RR: 18 (14 Mar 2022 14:47) (16 - 18)  SpO2: 95% (14 Mar 2022 14:47) (95% - 97%)       CBC Full  -  ( 14 Mar 2022 06:49 )  WBC Count : 5.14 K/uL  RBC Count : 3.51 M/uL  Hemoglobin : 11.7 g/dL  Hematocrit : 34.4 %  Platelet Count - Automated : 150 K/uL  Mean Cell Volume : 98.0 fl  Mean Cell Hemoglobin : 33.3 pg  Mean Cell Hemoglobin Concentration : 34.0 gm/dL  Auto Neutrophil # : x  Auto Lymphocyte # : x  Auto Monocyte # : x  Auto Eosinophil # : x  Auto Basophil # : x  Auto Neutrophil % : x  Auto Lymphocyte % : x  Auto Monocyte % : x  Auto Eosinophil % : x  Auto Basophil % : x      03-14    140  |  106  |  25<H>  ----------------------------<  165<H>  3.9   |  28  |  0.96    Ca    8.3<L>      14 Mar 2022 06:49  Phos  1.9     03-14  Mg     2.2     03-14    TPro  7.1  /  Alb  3.1<L>  /  TBili  1.1  /  DBili  x   /  AST  67<H>  /  ALT  74<H>  /  AlkPhos  83  03-14      Urinalysis (01.25.22 @ 01:28)   pH Urine: 6.0   Blood, Urine: Negative   Glucose Qualitative, Urine: Negative   Color: Yellow   Urine Appearance: Clear   Bilirubin: Negative   Ketone - Urine: Large   Specific Gravity: 1.030   Protein, Urine: 30 mg/dL   Urobilinogen: 4   Nitrite: Negative   Leukocyte Esterase Concentration: Negative     ECG  Ventricular Rate 89 BPM    Atrial Rate 89 BPM    P-R Interval 136 ms    QRS Duration 96 ms    Q-T Interval 366 ms    QTC Calculation(Bazett) 445 ms    P Axis 51 degrees    R Axis 15 degrees    T Axis 38 degrees    Diagnosis Line Normal sinus rhythm  Normal ECG      RADIOLOGY/IMAGING                  ACC: [id number] EXAM:  CT ABDOMEN AND PELVIS IC                          PROCEDURE DATE:  03/13/2022          INTERPRETATION:  CLINICAL INFORMATION: Vomiting and abdominal pain    COMPARISON: 1/12/2022    CONTRAST/COMPLICATIONS:  IV Contrast: Omnipaque 350  90 cc administered   10 cc discarded  Oral Contrast: NONE  Complications: None reported at time of study completion    PROCEDURE:  CT of the Abdomen and Pelvis was performed.  Sagittal and coronal reformats were performed.    FINDINGS:  LOWER CHEST: Basilar atelectasis. Left lower lobe calcified granuloma.   Coronary artery calcifications. Distal esophageal wall thickening.    LIVER: Steatosis. Right hepatic rim calcification.  BILE DUCTS: Normal caliber.  GALLBLADDER: Within normal limits.  SPLEEN: Within normal limits.  PANCREAS: Within normal limits.  ADRENALS: Within normal limits.  KIDNEYS/URETERS: Within normal limits.    BLADDER: Within normal limits.  REPRODUCTIVE ORGANS: Prostate is enlarged.    BOWEL: No bowel obstruction. Appendix is within normal limits. Colonic   diverticulosis.  PERITONEUM: No ascites.  VESSELS: Atherosclerotic changes.  RETROPERITONEUM/LYMPH NODES: No lymphadenopathy.  ABDOMINAL WALL: Fat-containing inguinal hernias.  BONES: Degenerative changes.    IMPRESSION:  Distal esophageal wall thickening, question esophagitis.  No CT evidence of bowel obstruction, diverticulitis, or colitis.

## 2022-03-15 LAB
ALBUMIN SERPL ELPH-MCNC: 2.8 G/DL — LOW (ref 3.5–5)
ALP SERPL-CCNC: 85 U/L — SIGNIFICANT CHANGE UP (ref 40–120)
ALT FLD-CCNC: 66 U/L DA — HIGH (ref 10–60)
ANION GAP SERPL CALC-SCNC: 5 MMOL/L — SIGNIFICANT CHANGE UP (ref 5–17)
AST SERPL-CCNC: 68 U/L — HIGH (ref 10–40)
BILIRUB SERPL-MCNC: 1.3 MG/DL — HIGH (ref 0.2–1.2)
BUN SERPL-MCNC: 13 MG/DL — SIGNIFICANT CHANGE UP (ref 7–18)
CALCIUM SERPL-MCNC: 8.4 MG/DL — SIGNIFICANT CHANGE UP (ref 8.4–10.5)
CHLORIDE SERPL-SCNC: 104 MMOL/L — SIGNIFICANT CHANGE UP (ref 96–108)
CO2 SERPL-SCNC: 29 MMOL/L — SIGNIFICANT CHANGE UP (ref 22–31)
CREAT SERPL-MCNC: 0.65 MG/DL — SIGNIFICANT CHANGE UP (ref 0.5–1.3)
EGFR: 103 ML/MIN/1.73M2 — SIGNIFICANT CHANGE UP
GLUCOSE BLDC GLUCOMTR-MCNC: 107 MG/DL — HIGH (ref 70–99)
GLUCOSE BLDC GLUCOMTR-MCNC: 110 MG/DL — HIGH (ref 70–99)
GLUCOSE BLDC GLUCOMTR-MCNC: 127 MG/DL — HIGH (ref 70–99)
GLUCOSE BLDC GLUCOMTR-MCNC: 135 MG/DL — HIGH (ref 70–99)
GLUCOSE BLDC GLUCOMTR-MCNC: 156 MG/DL — HIGH (ref 70–99)
GLUCOSE BLDC GLUCOMTR-MCNC: 184 MG/DL — HIGH (ref 70–99)
GLUCOSE SERPL-MCNC: 107 MG/DL — HIGH (ref 70–99)
HCT VFR BLD CALC: 35.6 % — LOW (ref 39–50)
HGB BLD-MCNC: 12.1 G/DL — LOW (ref 13–17)
MAGNESIUM SERPL-MCNC: 2.1 MG/DL — SIGNIFICANT CHANGE UP (ref 1.6–2.6)
MCHC RBC-ENTMCNC: 33.6 PG — SIGNIFICANT CHANGE UP (ref 27–34)
MCHC RBC-ENTMCNC: 34 GM/DL — SIGNIFICANT CHANGE UP (ref 32–36)
MCV RBC AUTO: 98.9 FL — SIGNIFICANT CHANGE UP (ref 80–100)
NRBC # BLD: 0 /100 WBCS — SIGNIFICANT CHANGE UP (ref 0–0)
PHOSPHATE SERPL-MCNC: 3.2 MG/DL — SIGNIFICANT CHANGE UP (ref 2.5–4.5)
PLATELET # BLD AUTO: 141 K/UL — LOW (ref 150–400)
POTASSIUM SERPL-MCNC: 3.4 MMOL/L — LOW (ref 3.5–5.3)
POTASSIUM SERPL-SCNC: 3.4 MMOL/L — LOW (ref 3.5–5.3)
PROT SERPL-MCNC: 7 G/DL — SIGNIFICANT CHANGE UP (ref 6–8.3)
RBC # BLD: 3.6 M/UL — LOW (ref 4.2–5.8)
RBC # FLD: 13.4 % — SIGNIFICANT CHANGE UP (ref 10.3–14.5)
SODIUM SERPL-SCNC: 138 MMOL/L — SIGNIFICANT CHANGE UP (ref 135–145)
WBC # BLD: 3.85 K/UL — SIGNIFICANT CHANGE UP (ref 3.8–10.5)
WBC # FLD AUTO: 3.85 K/UL — SIGNIFICANT CHANGE UP (ref 3.8–10.5)

## 2022-03-15 RX ORDER — INSULIN LISPRO 100/ML
VIAL (ML) SUBCUTANEOUS AT BEDTIME
Refills: 0 | Status: DISCONTINUED | OUTPATIENT
Start: 2022-03-15 | End: 2022-03-16

## 2022-03-15 RX ORDER — INSULIN LISPRO 100/ML
VIAL (ML) SUBCUTANEOUS
Refills: 0 | Status: DISCONTINUED | OUTPATIENT
Start: 2022-03-15 | End: 2022-03-16

## 2022-03-15 RX ORDER — POTASSIUM CHLORIDE 20 MEQ
10 PACKET (EA) ORAL
Refills: 0 | Status: COMPLETED | OUTPATIENT
Start: 2022-03-15 | End: 2022-03-15

## 2022-03-15 RX ORDER — ONDANSETRON 8 MG/1
4 TABLET, FILM COATED ORAL EVERY 8 HOURS
Refills: 0 | Status: DISCONTINUED | OUTPATIENT
Start: 2022-03-15 | End: 2022-03-16

## 2022-03-15 RX ADMIN — ENOXAPARIN SODIUM 40 MILLIGRAM(S): 100 INJECTION SUBCUTANEOUS at 21:35

## 2022-03-15 RX ADMIN — ONDANSETRON 8 MILLIGRAM(S): 8 TABLET, FILM COATED ORAL at 05:31

## 2022-03-15 RX ADMIN — Medication 100 MILLIGRAM(S): at 17:21

## 2022-03-15 RX ADMIN — PANTOPRAZOLE SODIUM 40 MILLIGRAM(S): 20 TABLET, DELAYED RELEASE ORAL at 12:12

## 2022-03-15 RX ADMIN — ONDANSETRON 4 MILLIGRAM(S): 8 TABLET, FILM COATED ORAL at 09:37

## 2022-03-15 RX ADMIN — Medication 105 MILLIGRAM(S): at 21:35

## 2022-03-15 RX ADMIN — Medication 1 MILLIGRAM(S): at 14:58

## 2022-03-15 RX ADMIN — Medication 105 MILLIGRAM(S): at 13:32

## 2022-03-15 RX ADMIN — Medication 100 MILLIGRAM(S): at 06:38

## 2022-03-15 RX ADMIN — Medication 1 MILLIGRAM(S): at 12:11

## 2022-03-15 RX ADMIN — Medication 100 MILLIEQUIVALENT(S): at 13:31

## 2022-03-15 RX ADMIN — PREGABALIN 1000 MICROGRAM(S): 225 CAPSULE ORAL at 12:11

## 2022-03-15 RX ADMIN — Medication 1 TABLET(S): at 12:12

## 2022-03-15 RX ADMIN — INSULIN GLARGINE 10 UNIT(S): 100 INJECTION, SOLUTION SUBCUTANEOUS at 21:36

## 2022-03-15 RX ADMIN — Medication 100 MILLIEQUIVALENT(S): at 12:12

## 2022-03-15 RX ADMIN — Medication 1.5 MILLIGRAM(S): at 02:36

## 2022-03-15 RX ADMIN — Medication 1.5 MILLIGRAM(S): at 05:32

## 2022-03-15 RX ADMIN — Medication 100 MILLIEQUIVALENT(S): at 11:02

## 2022-03-15 RX ADMIN — Medication 105 MILLIGRAM(S): at 05:32

## 2022-03-16 VITALS
HEART RATE: 96 BPM | OXYGEN SATURATION: 97 % | RESPIRATION RATE: 18 BRPM | SYSTOLIC BLOOD PRESSURE: 133 MMHG | TEMPERATURE: 98 F | DIASTOLIC BLOOD PRESSURE: 63 MMHG

## 2022-03-16 LAB
ALBUMIN SERPL ELPH-MCNC: 3.1 G/DL — LOW (ref 3.5–5)
ALP SERPL-CCNC: 98 U/L — SIGNIFICANT CHANGE UP (ref 40–120)
ALT FLD-CCNC: 85 U/L DA — HIGH (ref 10–60)
ANION GAP SERPL CALC-SCNC: 6 MMOL/L — SIGNIFICANT CHANGE UP (ref 5–17)
AST SERPL-CCNC: 98 U/L — HIGH (ref 10–40)
BILIRUB SERPL-MCNC: 1.1 MG/DL — SIGNIFICANT CHANGE UP (ref 0.2–1.2)
BUN SERPL-MCNC: 10 MG/DL — SIGNIFICANT CHANGE UP (ref 7–18)
CALCIUM SERPL-MCNC: 8.5 MG/DL — SIGNIFICANT CHANGE UP (ref 8.4–10.5)
CHLORIDE SERPL-SCNC: 102 MMOL/L — SIGNIFICANT CHANGE UP (ref 96–108)
CO2 SERPL-SCNC: 26 MMOL/L — SIGNIFICANT CHANGE UP (ref 22–31)
CREAT SERPL-MCNC: 0.7 MG/DL — SIGNIFICANT CHANGE UP (ref 0.5–1.3)
EGFR: 100 ML/MIN/1.73M2 — SIGNIFICANT CHANGE UP
GLUCOSE BLDC GLUCOMTR-MCNC: 134 MG/DL — HIGH (ref 70–99)
GLUCOSE BLDC GLUCOMTR-MCNC: 167 MG/DL — HIGH (ref 70–99)
GLUCOSE BLDC GLUCOMTR-MCNC: 193 MG/DL — HIGH (ref 70–99)
GLUCOSE SERPL-MCNC: 152 MG/DL — HIGH (ref 70–99)
HCT VFR BLD CALC: 36.6 % — LOW (ref 39–50)
HGB BLD-MCNC: 12.8 G/DL — LOW (ref 13–17)
MAGNESIUM SERPL-MCNC: 1.9 MG/DL — SIGNIFICANT CHANGE UP (ref 1.6–2.6)
MCHC RBC-ENTMCNC: 33.9 PG — SIGNIFICANT CHANGE UP (ref 27–34)
MCHC RBC-ENTMCNC: 35 GM/DL — SIGNIFICANT CHANGE UP (ref 32–36)
MCV RBC AUTO: 96.8 FL — SIGNIFICANT CHANGE UP (ref 80–100)
NRBC # BLD: 0 /100 WBCS — SIGNIFICANT CHANGE UP (ref 0–0)
PHOSPHATE SERPL-MCNC: 3.3 MG/DL — SIGNIFICANT CHANGE UP (ref 2.5–4.5)
PLATELET # BLD AUTO: 144 K/UL — LOW (ref 150–400)
POTASSIUM SERPL-MCNC: 3.8 MMOL/L — SIGNIFICANT CHANGE UP (ref 3.5–5.3)
POTASSIUM SERPL-SCNC: 3.8 MMOL/L — SIGNIFICANT CHANGE UP (ref 3.5–5.3)
PROT SERPL-MCNC: 7.5 G/DL — SIGNIFICANT CHANGE UP (ref 6–8.3)
RBC # BLD: 3.78 M/UL — LOW (ref 4.2–5.8)
RBC # FLD: 12.8 % — SIGNIFICANT CHANGE UP (ref 10.3–14.5)
SODIUM SERPL-SCNC: 134 MMOL/L — LOW (ref 135–145)
WBC # BLD: 4.87 K/UL — SIGNIFICANT CHANGE UP (ref 3.8–10.5)
WBC # FLD AUTO: 4.87 K/UL — SIGNIFICANT CHANGE UP (ref 3.8–10.5)

## 2022-03-16 PROCEDURE — 80053 COMPREHEN METABOLIC PANEL: CPT

## 2022-03-16 PROCEDURE — 83735 ASSAY OF MAGNESIUM: CPT

## 2022-03-16 PROCEDURE — 93005 ELECTROCARDIOGRAM TRACING: CPT

## 2022-03-16 PROCEDURE — 82009 KETONE BODYS QUAL: CPT

## 2022-03-16 PROCEDURE — 96374 THER/PROPH/DIAG INJ IV PUSH: CPT

## 2022-03-16 PROCEDURE — 80307 DRUG TEST PRSMV CHEM ANLYZR: CPT

## 2022-03-16 PROCEDURE — 74177 CT ABD & PELVIS W/CONTRAST: CPT | Mod: MA

## 2022-03-16 PROCEDURE — 85027 COMPLETE CBC AUTOMATED: CPT

## 2022-03-16 PROCEDURE — 83690 ASSAY OF LIPASE: CPT

## 2022-03-16 PROCEDURE — 36415 COLL VENOUS BLD VENIPUNCTURE: CPT

## 2022-03-16 PROCEDURE — 71045 X-RAY EXAM CHEST 1 VIEW: CPT

## 2022-03-16 PROCEDURE — 84100 ASSAY OF PHOSPHORUS: CPT

## 2022-03-16 PROCEDURE — 87635 SARS-COV-2 COVID-19 AMP PRB: CPT

## 2022-03-16 PROCEDURE — 96372 THER/PROPH/DIAG INJ SC/IM: CPT | Mod: XU

## 2022-03-16 PROCEDURE — 82248 BILIRUBIN DIRECT: CPT

## 2022-03-16 PROCEDURE — 85025 COMPLETE CBC W/AUTO DIFF WBC: CPT

## 2022-03-16 PROCEDURE — 82803 BLOOD GASES ANY COMBINATION: CPT

## 2022-03-16 PROCEDURE — 99285 EMERGENCY DEPT VISIT HI MDM: CPT | Mod: 25

## 2022-03-16 PROCEDURE — 82962 GLUCOSE BLOOD TEST: CPT

## 2022-03-16 RX ORDER — PREGABALIN 225 MG/1
1 CAPSULE ORAL
Qty: 0 | Refills: 0 | DISCHARGE
Start: 2022-03-16

## 2022-03-16 RX ORDER — CIPROFLOXACIN LACTATE 400MG/40ML
1 VIAL (ML) INTRAVENOUS
Qty: 4 | Refills: 0
Start: 2022-03-16 | End: 2022-03-17

## 2022-03-16 RX ADMIN — Medication 105 MILLIGRAM(S): at 05:03

## 2022-03-16 RX ADMIN — Medication 100 MILLIGRAM(S): at 06:19

## 2022-03-16 NOTE — PROGRESS NOTE ADULT - PROBLEM SELECTOR PLAN 6
Uncontrolled  Pt takes Metformin and repaglinide at home  Continue Lantus coverage QHS  Start SSI  Continue glucose monitoring  Continue low carb diet
Uncontrolled  Pt takes Metformin and repaglinide at home  Continue Lantus coverage QHS  Start SSI  Continue glucose monitoring  Continue low carb diet
Controlled  Pt takes Metformin and repaglinide at home  Continue Lantus coverage QHS  Continue glucose monitoring  Consider starting SSI when pt tolerating PO diet
Controlled  Pt takes Metformin and repaglinide at home  Continue Lantus coverage QHS  Continue glucose monitoring  Consider starting SSI when pt tolerating PO diet

## 2022-03-16 NOTE — PROGRESS NOTE ADULT - PROBLEM SELECTOR PLAN 8
DVT PPX: Lovenox   GI PPX: PPI

## 2022-03-16 NOTE — PROGRESS NOTE ADULT - PROBLEM SELECTOR PROBLEM 3
High anion gap metabolic acidosis

## 2022-03-16 NOTE — PROGRESS NOTE ADULT - SUBJECTIVE AND OBJECTIVE BOX
HPI:  66 YOM admitted with vomiting.  GI recommends PPI.  Pt tolerating CLD.    OVERNIGHT EVENTS:  No new overnight events.  Seen and examined at bedside.     REVIEW OF SYSTEMS:      CONSTITUTIONAL: No fever  EYES: no acute visual disturbances  NECK: No pain or stiffness  RESPIRATORY: No cough; No shortness of breath  CARDIOVASCULAR: No chest pain, no palpitations  GASTROINTESTINAL: No pain. No nausea, vomiting or diarrhea   NEUROLOGICAL: No headache or numbness, no tremors  MUSCULOSKELETAL: No joint pain, no muscle pain  GENITOURINARY: no dysuria, no frequency, no hesitancy  PSYCHIATRY: no depression, no anxiety  ALL OTHER  ROS negative        Vital Signs Last 24 Hrs  T(C): 36.4 (15 Mar 2022 13:09), Max: 37 (14 Mar 2022 14:47)  T(F): 97.6 (15 Mar 2022 13:09), Max: 98.6 (14 Mar 2022 14:47)  HR: 80 (15 Mar 2022 13:09) (72 - 94)  BP: 151/83 (15 Mar 2022 13:09) (125/66 - 153/84)  BP(mean): --  RR: 18 (15 Mar 2022 13:09) (18 - 18)  SpO2: 100% (15 Mar 2022 13:09) (95% - 100%)    ________________________________________________  PHYSICAL EXAM:    GENERAL: NAD  HEENT: Normocephalic; conjunctivae and sclerae clear;  NECK : supple, no JVD  CHEST/LUNG: Clear to auscultation; Nonlabored  HEART: S1 S2  regular  ABDOMEN: Soft, Nontender, Nondistended; Bowel sounds present  EXTREMITIES: no cyanosis; no LE edema; no calf tenderness  SKIN: warm and dry; No rashes or lesions  NERVOUS SYSTEM:  Alert; no new deficits    _________________________________________________  CURRENT MEDICATIONS:    MEDICATIONS  (STANDING):  ciprofloxacin   IVPB 200 milliGRAM(s) IV Intermittent every 12 hours  cyanocobalamin 1000 MICROGram(s) Oral daily  dextrose 40% Gel 15 Gram(s) Oral once  dextrose 5%. 1000 milliLiter(s) (50 mL/Hr) IV Continuous <Continuous>  dextrose 5%. 1000 milliLiter(s) (100 mL/Hr) IV Continuous <Continuous>  dextrose 50% Injectable 25 Gram(s) IV Push once  dextrose 50% Injectable 12.5 Gram(s) IV Push once  dextrose 50% Injectable 25 Gram(s) IV Push once  enoxaparin Injectable 40 milliGRAM(s) SubCutaneous every 24 hours  folic acid 1 milliGRAM(s) Oral daily  glucagon  Injectable 1 milliGRAM(s) IntraMuscular once  influenza  Vaccine (HIGH DOSE) 0.7 milliLiter(s) IntraMuscular once  insulin glargine Injectable (LANTUS) 10 Unit(s) SubCutaneous at bedtime  insulin lispro (ADMELOG) corrective regimen sliding scale   SubCutaneous three times a day before meals  insulin lispro (ADMELOG) corrective regimen sliding scale   SubCutaneous at bedtime  multivitamin 1 Tablet(s) Oral daily  pantoprazole  Injectable 40 milliGRAM(s) IV Push daily  sodium chloride 0.9%. 1000 milliLiter(s) (100 mL/Hr) IV Continuous <Continuous>  thiamine IVPB 500 milliGRAM(s) IV Intermittent every 8 hours    MEDICATIONS  (PRN):  LORazepam   Injectable 1 milliGRAM(s) IV Push every 6 hours PRN CIWA  ondansetron   Disintegrating Tablet 4 milliGRAM(s) Oral every 8 hours PRN Nausea and/or Vomiting      __________________________________________________  LABS:                          12.1   3.85  )-----------( 141      ( 15 Mar 2022 05:46 )             35.6     03-15    138  |  104  |  13  ----------------------------<  107<H>  3.4<L>   |  29  |  0.65    Ca    8.4      15 Mar 2022 05:46  Phos  3.2     03-15  Mg     2.1     03-15    TPro  7.0  /  Alb  2.8<L>  /  TBili  1.3<H>  /  DBili  x   /  AST  68<H>  /  ALT  66<H>  /  AlkPhos  85  03-15        CAPILLARY BLOOD GLUCOSE      POCT Blood Glucose.: 184 mg/dL (15 Mar 2022 11:21)  POCT Blood Glucose.: 135 mg/dL (15 Mar 2022 07:40)  POCT Blood Glucose.: 110 mg/dL (15 Mar 2022 06:12)  POCT Blood Glucose.: 107 mg/dL (15 Mar 2022 00:02)  POCT Blood Glucose.: 108 mg/dL (14 Mar 2022 21:47)  POCT Blood Glucose.: 143 mg/dL (14 Mar 2022 16:37)      __________________________________________________  RADIOLOGY & ADDITIONAL TESTS:    Imaging Personally Reviewed:  YES    < from: CT Abdomen and Pelvis w/ IV Cont (03.13.22 @ 14:16) >  IMPRESSION:  Distal esophageal wall thickening, question esophagitis.  No CT evidence of bowel obstruction, diverticulitis, or colitis.    < end of copied text >    < from: Xray Chest 1 View-PORTABLE IMMEDIATE (Xray Chest 1 View-PORTABLE IMMEDIATE .) (03.13.22 @ 14:49) >  IMPRESSION:  No acute radiographic findings and no change    < end of copied text >    Consultant(s) Notes Reviewed:   YES     Plan of care was discussed with patient and /or primary care giver; all questions and concerns were addressed and care was aligned with patient's wishes.    Plan discussed with attending and consulting physicians.  
CHICA ROBERTSON  MR# [id number]  66yMale        Patient is a 66y old  Male who presents with a chief complaint of DT (14 Mar 2022 12:33)      INTERVAL HPI/OVERNIGHT EVENTS:  Patient seen and examined at bedside. No notations of chest pain, palpitation, SOB, orthopnea, nausea, vomiting or abdominal pain. Pt has been tapered off all IV ativan for the past 8 hours w/o any s/s of DT  thus far.    ALLERGIES  No Known Allergies      MEDICATIONS  ciprofloxacin   IVPB 200 milliGRAM(s) IV Intermittent every 12 hours  cyanocobalamin 1000 MICROGram(s) Oral daily  dextrose 40% Gel 15 Gram(s) Oral once  dextrose 5%. 1000 milliLiter(s) IV Continuous <Continuous>  dextrose 5%. 1000 milliLiter(s) IV Continuous <Continuous>  dextrose 50% Injectable 25 Gram(s) IV Push once  dextrose 50% Injectable 12.5 Gram(s) IV Push once  dextrose 50% Injectable 25 Gram(s) IV Push once  enoxaparin Injectable 40 milliGRAM(s) SubCutaneous every 24 hours  folic acid 1 milliGRAM(s) Oral daily  glucagon  Injectable 1 milliGRAM(s) IntraMuscular once  influenza  Vaccine (HIGH DOSE) 0.7 milliLiter(s) IntraMuscular once  insulin glargine Injectable (LANTUS) 10 Unit(s) SubCutaneous at bedtime  multivitamin 1 Tablet(s) Oral daily  ondansetron   Disintegrating Tablet 4 milliGRAM(s) Oral every 8 hours PRN Nausea and/or Vomiting  pantoprazole  Injectable 40 milliGRAM(s) IV Push daily  sodium chloride 0.9%. 1000 milliLiter(s) IV Continuous <Continuous>  thiamine IVPB 500 milliGRAM(s) IV Intermittent every 8 hours              REVIEW OF SYSTEMS:  CONSTITUTIONAL: No fever, weight loss, or fatigue  EYES: No eye pain, visual disturbances, or discharge  ENT:  No difficulty hearing, tinnitus, vertigo; No sinus or throat pain  NECK: No pain or stiffness  RESPIRATORY: No cough, wheezing, chills or hemoptysis; No Shortness of Breath  CARDIOVASCULAR: No chest pain, palpitations, passing out, dizziness, or leg swelling  GASTROINTESTINAL: No abdominal or epigastric pain. No nausea, vomiting, or hematemesis; No diarrhea or constipation. No melena or hematochezia.  GENITOURINARY: No dysuria, frequency, hematuria, or incontinence  NEUROLOGICAL: No headaches, memory loss, loss of strength, numbness, or tremors  SKIN: No itching, burning, rashes, or lesions   LYMPH Nodes: No enlarged glands  ENDOCRINE: No heat or cold intolerance; No hair loss  MUSCULOSKELETAL: No joint pain or swelling; No muscle, back, or extremity pain  PSYCHIATRIC: No depression, anxiety, mood swings, or difficulty sleeping  HEME/LYMPH: No easy bruising, or bleeding gums  ALLERGY AND IMMUNOLOGIC: No hives or eczema	    [ ] All others negative	  [ ] Unable to obtain      T(C): 36.4 (03-15-22 @ 13:09), Max: 37 (03-14-22 @ 14:47)  T(F): 97.6 (03-15-22 @ 13:09), Max: 98.6 (03-14-22 @ 14:47)  HR: 80 (03-15-22 @ 13:09) (72 - 94)  BP: 151/83 (03-15-22 @ 13:09) (125/66 - 153/84)  RR: 18 (03-15-22 @ 13:09) (18 - 18)  SpO2: 100% (03-15-22 @ 13:09) (95% - 100%)  Wt(kg): --    I&O's Summary    14 Mar 2022 07:01  -  15 Mar 2022 07:00  --------------------------------------------------------  IN: 0 mL / OUT: 450 mL / NET: -450 mL          PHYSICAL EXAM:  A X O x  HEAD:  Atraumatic, Normocephalic  EYES: EOMI, PERRLA, conjunctiva and sclera clear  NECK: Supple, No JVD, Normal thyroid  Resp: CTAB, No crackles, wheezing,   CVS: Regular rate and rhythm; No discernable murmurs, rubs, or gallops  ABD: Soft, Nontender, Nondistended; Bowel sounds present  EXTREMITIES:  2+ Peripheral Pulses, No edema  LYMPH: No dicernable lymphadenopathy noted  GENERAL: NAD, well-groomed, well-developed      LABS:                        12.1   3.85  )-----------( 141      ( 15 Mar 2022 05:46 )             35.6     03-15    138  |  104  |  13  ----------------------------<  107<H>  3.4<L>   |  29  |  0.65    Ca    8.4      15 Mar 2022 05:46  Phos  3.2     03-15  Mg     2.1     03-15    TPro  7.0  /  Alb  2.8<L>  /  TBili  1.3<H>  /  DBili  x   /  AST  68<H>  /  ALT  66<H>  /  AlkPhos  85  03-15        CAPILLARY BLOOD GLUCOSE      POCT Blood Glucose.: 184 mg/dL (15 Mar 2022 11:21)  POCT Blood Glucose.: 135 mg/dL (15 Mar 2022 07:40)  POCT Blood Glucose.: 110 mg/dL (15 Mar 2022 06:12)  POCT Blood Glucose.: 107 mg/dL (15 Mar 2022 00:02)  POCT Blood Glucose.: 108 mg/dL (14 Mar 2022 21:47)  POCT Blood Glucose.: 143 mg/dL (14 Mar 2022 16:37)      Troponins:  ProBNP:  Lipid Profile:   HgA1c:  TSH:           RADIOLOGY & ADDITIONAL TESTS:    Imaging Personally Reviewed:  [ ] YES  [ ] NO      Consultant(s) Notes Reviewed:  [x ] YES  [ ] NO    Care Discussed with Consultants/Other Providers [ x] YES  [ ] NO          PAST MEDICAL & SURGICAL HISTORY:  EtOH dependence    Diabetes    HTN (hypertension)    No significant past surgical history          Alcohol dependence with withdrawal    FH: diabetes mellitus (Sibling)    Handoff    MEWS Score    EtOH dependence    Diabetes    HTN (hypertension)    HTN (hypertension)    Alcohol withdrawal    Uncontrollable nausea and vomiting    Abdominal pain with vomiting    History of alcohol use disorder    High anion gap metabolic acidosis    Elevated serum creatinine    Transaminitis    DM (diabetes mellitus)    HTN (hypertension)    Prophylactic measure    Discharge planning issues    No significant past surgical history    No significant past surgical history    A) VOMITING    23    Intractable nausea and vomiting    SysAdmin_VisitLink            
HPI:  66 YOM admitted with vomiting.  GI consulted.    OVERNIGHT EVENTS:  No new overnight events.  Seen and examined at bedside.     REVIEW OF SYSTEMS:      CONSTITUTIONAL: No fever  EYES: no acute visual disturbances  NECK: No pain or stiffness  RESPIRATORY: No cough; No shortness of breath  CARDIOVASCULAR: No chest pain, no palpitations  GASTROINTESTINAL: No pain. No nausea, vomiting or diarrhea   NEUROLOGICAL: No headache or numbness, no tremors  MUSCULOSKELETAL: No joint pain, no muscle pain  GENITOURINARY: no dysuria, no frequency, no hesitancy  PSYCHIATRY: no depression, no anxiety  ALL OTHER  ROS negative        Vital Signs Last 24 Hrs  T(C): 36.5 (14 Mar 2022 05:15), Max: 37 (13 Mar 2022 15:47)  T(F): 97.7 (14 Mar 2022 05:15), Max: 98.6 (13 Mar 2022 15:47)  HR: 100 (14 Mar 2022 05:15) (100 - 115)  BP: 130/73 (14 Mar 2022 05:15) (130/73 - 160/92)  BP(mean): --  RR: 18 (14 Mar 2022 05:15) (16 - 18)  SpO2: 96% (14 Mar 2022 05:15) (95% - 97%)    ________________________________________________  PHYSICAL EXAM:    GENERAL: NAD  HEENT: Normocephalic; conjunctivae and sclerae clear;  NECK : supple, no JVD  CHEST/LUNG: Clear to auscultation; Nonlabored  HEART: S1 S2  regular  ABDOMEN: Soft, Nontender, Nondistended; Bowel sounds present  EXTREMITIES: no cyanosis; no LE edema; no calf tenderness  SKIN: warm and dry; No rashes or lesions  NERVOUS SYSTEM:  Alert; no new deficits    _________________________________________________  CURRENT MEDICATIONS:    MEDICATIONS  (STANDING):  ciprofloxacin   IVPB 200 milliGRAM(s) IV Intermittent every 12 hours  cyanocobalamin 1000 MICROGram(s) Oral daily  dextrose 40% Gel 15 Gram(s) Oral once  dextrose 5%. 1000 milliLiter(s) (50 mL/Hr) IV Continuous <Continuous>  dextrose 5%. 1000 milliLiter(s) (100 mL/Hr) IV Continuous <Continuous>  dextrose 50% Injectable 25 Gram(s) IV Push once  dextrose 50% Injectable 12.5 Gram(s) IV Push once  dextrose 50% Injectable 25 Gram(s) IV Push once  enoxaparin Injectable 40 milliGRAM(s) SubCutaneous every 24 hours  folic acid 1 milliGRAM(s) Oral daily  glucagon  Injectable 1 milliGRAM(s) IntraMuscular once  influenza  Vaccine (HIGH DOSE) 0.7 milliLiter(s) IntraMuscular once  insulin glargine Injectable (LANTUS) 10 Unit(s) SubCutaneous at bedtime  LORazepam   Injectable 1.5 milliGRAM(s) IV Push every 4 hours  LORazepam   Injectable   IV Push   multivitamin 1 Tablet(s) Oral daily  ondansetron Injectable 8 milliGRAM(s) IV Push two times a day  pantoprazole  Injectable 40 milliGRAM(s) IV Push daily  sodium chloride 0.9%. 1000 milliLiter(s) (100 mL/Hr) IV Continuous <Continuous>  thiamine IVPB 500 milliGRAM(s) IV Intermittent every 8 hours    MEDICATIONS  (PRN):  LORazepam   Injectable 2 milliGRAM(s) IV Push every 2 hours PRN Symptom-triggered: each CIWA -Ar score 8 or GREATER      __________________________________________________  LABS:                          11.7   5.14  )-----------( 150      ( 14 Mar 2022 06:49 )             34.4     03-14    140  |  106  |  25<H>  ----------------------------<  165<H>  3.9   |  28  |  0.96    Ca    8.3<L>      14 Mar 2022 06:49  Phos  1.9     03-14  Mg     2.2     03-14    TPro  7.1  /  Alb  3.1<L>  /  TBili  1.1  /  DBili  x   /  AST  67<H>  /  ALT  74<H>  /  AlkPhos  83  03-14        CAPILLARY BLOOD GLUCOSE      POCT Blood Glucose.: 119 mg/dL (14 Mar 2022 12:29)  POCT Blood Glucose.: 150 mg/dL (14 Mar 2022 06:13)  POCT Blood Glucose.: 126 mg/dL (13 Mar 2022 22:52)  POCT Blood Glucose.: 131 mg/dL (13 Mar 2022 17:00)  POCT Blood Glucose.: 192 mg/dL (13 Mar 2022 15:58)      __________________________________________________  RADIOLOGY & ADDITIONAL TESTS:    Imaging Personally Reviewed:  YES    < from: CT Abdomen and Pelvis w/ IV Cont (03.13.22 @ 14:16) >  IMPRESSION:  Distal esophageal wall thickening, question esophagitis.  No CT evidence of bowel obstruction, diverticulitis, or colitis.    < end of copied text >    Consultant(s) Notes Reviewed:   YES     Plan of care was discussed with patient and /or primary care giver; all questions and concerns were addressed and care was aligned with patient's wishes.    Plan discussed with attending and consulting physicians.  
HPI:  66 YOM admitted with vomiting.  GI recommends PPI.  Pt tolerating CLD.    OVERNIGHT EVENTS:  No new overnight events.  Seen and examined at bedside.     REVIEW OF SYSTEMS:      CONSTITUTIONAL: No fever  EYES: no acute visual disturbances  NECK: No pain or stiffness  RESPIRATORY: No cough; No shortness of breath  CARDIOVASCULAR: No chest pain, no palpitations  GASTROINTESTINAL: No pain. No nausea, vomiting or diarrhea   NEUROLOGICAL: No headache or numbness, no tremors  MUSCULOSKELETAL: No joint pain, no muscle pain  GENITOURINARY: no dysuria, no frequency, no hesitancy  PSYCHIATRY: no depression, no anxiety  ALL OTHER  ROS negative        Vital Signs Last 24 Hrs  T(C): 36.4 (15 Mar 2022 13:09), Max: 37 (14 Mar 2022 14:47)  T(F): 97.6 (15 Mar 2022 13:09), Max: 98.6 (14 Mar 2022 14:47)  HR: 80 (15 Mar 2022 13:09) (72 - 94)  BP: 151/83 (15 Mar 2022 13:09) (125/66 - 153/84)  BP(mean): --  RR: 18 (15 Mar 2022 13:09) (18 - 18)  SpO2: 100% (15 Mar 2022 13:09) (95% - 100%)    ________________________________________________  PHYSICAL EXAM:    GENERAL: NAD  HEENT: Normocephalic; conjunctivae and sclerae clear;  NECK : supple, no JVD  CHEST/LUNG: Clear to auscultation; Nonlabored  HEART: S1 S2  regular  ABDOMEN: Soft, Nontender, Nondistended; Bowel sounds present  EXTREMITIES: no cyanosis; no LE edema; no calf tenderness  SKIN: warm and dry; No rashes or lesions  NERVOUS SYSTEM:  Alert; no new deficits    _________________________________________________  CURRENT MEDICATIONS:    MEDICATIONS  (STANDING):  ciprofloxacin   IVPB 200 milliGRAM(s) IV Intermittent every 12 hours  cyanocobalamin 1000 MICROGram(s) Oral daily  dextrose 40% Gel 15 Gram(s) Oral once  dextrose 5%. 1000 milliLiter(s) (50 mL/Hr) IV Continuous <Continuous>  dextrose 5%. 1000 milliLiter(s) (100 mL/Hr) IV Continuous <Continuous>  dextrose 50% Injectable 25 Gram(s) IV Push once  dextrose 50% Injectable 12.5 Gram(s) IV Push once  dextrose 50% Injectable 25 Gram(s) IV Push once  enoxaparin Injectable 40 milliGRAM(s) SubCutaneous every 24 hours  folic acid 1 milliGRAM(s) Oral daily  glucagon  Injectable 1 milliGRAM(s) IntraMuscular once  influenza  Vaccine (HIGH DOSE) 0.7 milliLiter(s) IntraMuscular once  insulin glargine Injectable (LANTUS) 10 Unit(s) SubCutaneous at bedtime  insulin lispro (ADMELOG) corrective regimen sliding scale   SubCutaneous three times a day before meals  insulin lispro (ADMELOG) corrective regimen sliding scale   SubCutaneous at bedtime  multivitamin 1 Tablet(s) Oral daily  pantoprazole  Injectable 40 milliGRAM(s) IV Push daily  sodium chloride 0.9%. 1000 milliLiter(s) (100 mL/Hr) IV Continuous <Continuous>  thiamine IVPB 500 milliGRAM(s) IV Intermittent every 8 hours    MEDICATIONS  (PRN):  LORazepam   Injectable 1 milliGRAM(s) IV Push every 6 hours PRN CIWA  ondansetron   Disintegrating Tablet 4 milliGRAM(s) Oral every 8 hours PRN Nausea and/or Vomiting      __________________________________________________  LABS:                          12.1   3.85  )-----------( 141      ( 15 Mar 2022 05:46 )             35.6     03-15    138  |  104  |  13  ----------------------------<  107<H>  3.4<L>   |  29  |  0.65    Ca    8.4      15 Mar 2022 05:46  Phos  3.2     03-15  Mg     2.1     03-15    TPro  7.0  /  Alb  2.8<L>  /  TBili  1.3<H>  /  DBili  x   /  AST  68<H>  /  ALT  66<H>  /  AlkPhos  85  03-15        CAPILLARY BLOOD GLUCOSE      POCT Blood Glucose.: 184 mg/dL (15 Mar 2022 11:21)  POCT Blood Glucose.: 135 mg/dL (15 Mar 2022 07:40)  POCT Blood Glucose.: 110 mg/dL (15 Mar 2022 06:12)  POCT Blood Glucose.: 107 mg/dL (15 Mar 2022 00:02)  POCT Blood Glucose.: 108 mg/dL (14 Mar 2022 21:47)  POCT Blood Glucose.: 143 mg/dL (14 Mar 2022 16:37)      __________________________________________________  RADIOLOGY & ADDITIONAL TESTS:    Imaging Personally Reviewed:  YES    < from: CT Abdomen and Pelvis w/ IV Cont (03.13.22 @ 14:16) >  IMPRESSION:  Distal esophageal wall thickening, question esophagitis.  No CT evidence of bowel obstruction, diverticulitis, or colitis.    < end of copied text >    < from: Xray Chest 1 View-PORTABLE IMMEDIATE (Xray Chest 1 View-PORTABLE IMMEDIATE .) (03.13.22 @ 14:49) >  IMPRESSION:  No acute radiographic findings and no change    < end of copied text >    Consultant(s) Notes Reviewed:   YES     Plan of care was discussed with patient and /or primary care giver; all questions and concerns were addressed and care was aligned with patient's wishes.    Plan discussed with attending and consulting physicians.

## 2022-03-16 NOTE — DISCHARGE NOTE PROVIDER - CARE PROVIDER_API CALL
Brandon Hendricks)  Medicine  [address] [place] [address]  Phone: [phone]  Fax: [phone]  Follow Up Time:

## 2022-03-16 NOTE — PROGRESS NOTE ADULT - PROBLEM SELECTOR PLAN 9
Pt will be medically stable for discharge when tolerating PO diet  SW consulted for EtOH rehab referral
Pt will be medically stable for discharge when tolerating PO diet and no longer display symptoms of withdrawal  SW consulted for EtOH rehab referral
Pt will be medically stable for discharge when tolerating PO diet and no longer display symptoms of withdrawal  SW consulted for EtOH rehab referral
Pt will be medically stable for discharge when tolerating PO diet  SW consulted for EtOH rehab referral

## 2022-03-16 NOTE — PROGRESS NOTE ADULT - PROBLEM SELECTOR PROBLEM 2
History of alcohol use disorder

## 2022-03-16 NOTE — DISCHARGE NOTE PROVIDER - HOSPITAL COURSE
66 year old male with a past medical history of HTN, DM, and Etoh use disorder coming to ED c/o of vomiting and epigastric pain admitted for alcohol withdrawal symptoms vs intractable vomiting. Pt's symptomology of DT proving well control, he was placed on a gradual taper of IV ativan with CIWA as backup for any breakthrough DT. He has declined any suggestions for etoh rehab as inpatient or outpatient. SW onboard.   Pt has been tapered off all IV ativan for the past 8 hours w/o any s/s of DT  and is tolerating a regular diet. Pt medically stable for d/c.

## 2022-03-16 NOTE — DISCHARGE NOTE NURSING/CASE MANAGEMENT/SOCIAL WORK - PATIENT PORTAL LINK FT
You can access the FollowMyHealth Patient Portal offered by [facility] by registering at the following website: [email or web address]. By joining [employer]’s FollowMyHealth portal, you will also be able to view your health information using other applications (apps) compatible with our system.

## 2022-03-16 NOTE — PROGRESS NOTE ADULT - PROBLEM SELECTOR PLAN 7
Normotensive  Pt not on medication at home   Consider starting ACE if SBP > 140 x 2 consecutive readings  Monitor BP

## 2022-03-16 NOTE — PROGRESS NOTE ADULT - PROBLEM SELECTOR PROBLEM 4
Elevated serum creatinine

## 2022-03-16 NOTE — PROGRESS NOTE ADULT - PROBLEM SELECTOR PLAN 1
CT AP noted above  SpO2 96% on room air  Continue Cipro (day 2)  Continue Protonix  Continue NPO   Continue Zofran RTC  Monitor respiratory status  Dr. Sanabria, GI, consulted  -Ciprofloxacin for esophagitis
CT AP noted above  SpO2 96% on room air  Continue Cipro (day 3)  Continue Protonix  Continue NPO   Continue Zofran RTC  Monitor respiratory status  Dr. Sanabria, GI, consulted  -Ciprofloxacin for esophagitis
CT AP noted above  SpO2 96% on room air  Continue Cipro (day 3)  Continue Protonix  Continue NPO   Continue Zofran RTC  Monitor respiratory status  Dr. Sanabria, GI, consulted  -Ciprofloxacin for esophagitis
CT AP noted above  SpO2 96% on room air  Continue Cipro (day 3)  Continue Protonix  Diet advancement as tolerated  Continue Zofran RTC  Dr. Sanabria, GI, consulted  -Ciprofloxacin for esophagitis

## 2022-03-16 NOTE — DISCHARGE NOTE NURSING/CASE MANAGEMENT/SOCIAL WORK - NSDCPEFALRISK_GEN_ALL_CORE
For information on Fall & Injury Prevention, visit: [email or web address] OR  [email or web address] OR  https://www.cdc.gov/steadi/patient.html

## 2022-03-16 NOTE — PROGRESS NOTE ADULT - PROBLEM SELECTOR PLAN 5
CT abdomen noted  AST/ALT 68/66 (downtrending)  Follow up CMP in AM
CT abdomen noted  AST/ALT 67/74 (downtrending)  Follow up CMP in AM
CT abdomen noted  AST/ALT 68/66 (downtrending)  Follow up CMP in AM
CT abdomen noted  AST/ALT 67/74 (downtrending)  Follow up CMP in AM

## 2022-03-16 NOTE — PROGRESS NOTE ADULT - ATTENDING COMMENTS
Pt's symptomology of DT proving well control, will continue gradual taper of IV ativan with CIWA as backup for any breakthrough DT. D/C planning to follow once off all ativan and table; pt has declined any suggestions for etoh rehab as inpatient or outpatient. SW consult to follow.    3/15/22- Pt has been tapered off all IV ativan for the past 8 hours w/o any s/s of DT  thus far. Pt medically stable for d/c.
Pt's symptomology of DT proving well control, will continue gradual taper of IV ativan with CIWA as backup for any breakthrough DT. D/C planning to follow once off all ativan and table; pt has declined any suggestions for etoh rehab as inpatient or outpatient.     3/16/22- D/C planning for home; medically cleared.
Pt's symptomology of DT proving well control, will continue gradual taper of IV ativan with CIWA as backup for any breakthrough DT. D/C planning to follow once off all ativan and table; pt has declined any suggestions for etoh rehab as inpatient or outpatient. SW consult to follow.

## 2022-03-16 NOTE — DISCHARGE NOTE PROVIDER - NSDCMRMEDTOKEN_GEN_ALL_CORE_FT
ciprofloxacin 250 mg oral tablet: 1 tab(s) orally 2 times a day   cyanocobalamin 1000 mcg oral tablet: 1 tab(s) orally once a day  famotidine 20 mg oral tablet: 1 tab(s) orally once a day   folic acid 1 mg oral tablet: 1 tab(s) orally once a day  metFORMIN 1000 mg oral tablet: 1 tab(s) orally 2 times a day  Multiple Vitamins with Minerals oral tablet: 1 tab(s) orally once a day  repaglinide 1 mg oral tablet: 1 tab(s) orally 3 times a day (before meals)  thiamine 100 mg oral tablet: 1 tab(s) orally once a day

## 2022-03-16 NOTE — DISCHARGE NOTE PROVIDER - NSDCCPCAREPLAN_GEN_ALL_CORE_FT
PRINCIPAL DISCHARGE DIAGNOSIS  Diagnosis: Alcohol withdrawal  Assessment and Plan of Treatment:       SECONDARY DISCHARGE DIAGNOSES  Diagnosis: HTN (hypertension)  Assessment and Plan of Treatment:     Diagnosis: Intractable nausea and vomiting  Assessment and Plan of Treatment:     Diagnosis: Transaminitis  Assessment and Plan of Treatment:      PRINCIPAL DISCHARGE DIAGNOSIS  Diagnosis: Alcohol withdrawal  Assessment and Plan of Treatment: Continue folate, thiamine, and MVI.   you refused decline alcohol rehab. F/u with PCP      SECONDARY DISCHARGE DIAGNOSES  Diagnosis: HTN (hypertension)  Assessment and Plan of Treatment: BP stable   low salt diet  outpatient follow-up with PCP    Diagnosis: Intractable nausea and vomiting  Assessment and Plan of Treatment: Resolved    Diagnosis: DM (diabetes mellitus)  Assessment and Plan of Treatment: controlled - your A1c 6.5  Resume home meds  consistent carbohydrate diet      Diagnosis: Transaminitis  Assessment and Plan of Treatment: You need to follow-up with outpatient gastroenterology/hepatology to monitor your liver enzymes. Likely elevated secondary to ETOH use

## 2022-03-16 NOTE — DISCHARGE NOTE NURSING/CASE MANAGEMENT/SOCIAL WORK - NSDCFUADDAPPT_GEN_ALL_CORE_FT
[facility]  [address] [place] [address]  NATHANIEL, April 11, 2022 at 10:20 AM

## 2022-03-16 NOTE — PROGRESS NOTE ADULT - PROBLEM SELECTOR PLAN 4
Resolved  SCr 0.96  Follow up CMP in AM
Resolved  SCr 0.65  Follow up CMP in AM
Resolved  SCr 0.96  Follow up CMP in AM
Resolved  SCr 0.65  Follow up CMP in AM

## 2022-03-16 NOTE — PROGRESS NOTE ADULT - PROBLEM SELECTOR PLAN 2
CIWA 4  Start PRN Ativan  Continue folate, thiamine, and MVI.   SW consulted
CIWA 4  Start PRN Ativan  Continue folate, thiamine, and MVI.   SW consulted
CIWA 4  Continue Ativan  Continue folate, thiamine, and MVI.   SW consulted
CIWA 4  IV Ativan routine tapered of now w/o any DT symptomology  Continue folate, thiamine, and MVI.   SW consulted

## 2022-04-08 ENCOUNTER — INPATIENT (INPATIENT)
Facility: HOSPITAL | Age: 67
LOS: 3 days | Discharge: HOME | DRG: 897 | End: 2022-04-12
Attending: INTERNAL MEDICINE | Admitting: INTERNAL MEDICINE
Payer: MEDICAID

## 2022-04-08 VITALS
RESPIRATION RATE: 978 BRPM | DIASTOLIC BLOOD PRESSURE: 80 MMHG | HEIGHT: 63 IN | SYSTOLIC BLOOD PRESSURE: 124 MMHG | HEART RATE: 117 BPM | OXYGEN SATURATION: 95 % | TEMPERATURE: 98 F

## 2022-04-08 DIAGNOSIS — R11.10 VOMITING, UNSPECIFIED: ICD-10-CM

## 2022-04-08 LAB
ALBUMIN SERPL ELPH-MCNC: 4.4 G/DL — SIGNIFICANT CHANGE UP (ref 3.5–5)
ALP SERPL-CCNC: 98 U/L — SIGNIFICANT CHANGE UP (ref 40–120)
ALT FLD-CCNC: 63 U/L DA — HIGH (ref 10–60)
ANION GAP SERPL CALC-SCNC: 25 MMOL/L — HIGH (ref 5–17)
ANION GAP SERPL CALC-SCNC: 25 MMOL/L — HIGH (ref 5–17)
ANION GAP SERPL CALC-SCNC: 27 MMOL/L — HIGH (ref 5–17)
AST SERPL-CCNC: 92 U/L — HIGH (ref 10–40)
BASOPHILS # BLD AUTO: 0.02 K/UL — SIGNIFICANT CHANGE UP (ref 0–0.2)
BASOPHILS NFR BLD AUTO: 0.2 % — SIGNIFICANT CHANGE UP (ref 0–2)
BILIRUB SERPL-MCNC: 1.7 MG/DL — HIGH (ref 0.2–1.2)
BUN SERPL-MCNC: 29 MG/DL — HIGH (ref 7–18)
BUN SERPL-MCNC: 34 MG/DL — HIGH (ref 7–18)
BUN SERPL-MCNC: 34 MG/DL — HIGH (ref 7–18)
CALCIUM SERPL-MCNC: 7.8 MG/DL — LOW (ref 8.4–10.5)
CALCIUM SERPL-MCNC: 8.5 MG/DL — SIGNIFICANT CHANGE UP (ref 8.4–10.5)
CALCIUM SERPL-MCNC: 9.1 MG/DL — SIGNIFICANT CHANGE UP (ref 8.4–10.5)
CHLORIDE SERPL-SCNC: 100 MMOL/L — SIGNIFICANT CHANGE UP (ref 96–108)
CHLORIDE SERPL-SCNC: 90 MMOL/L — LOW (ref 96–108)
CHLORIDE SERPL-SCNC: 95 MMOL/L — LOW (ref 96–108)
CO2 SERPL-SCNC: 15 MMOL/L — LOW (ref 22–31)
CO2 SERPL-SCNC: 16 MMOL/L — LOW (ref 22–31)
CO2 SERPL-SCNC: 17 MMOL/L — LOW (ref 22–31)
CREAT SERPL-MCNC: 0.98 MG/DL — SIGNIFICANT CHANGE UP (ref 0.5–1.3)
CREAT SERPL-MCNC: 1.14 MG/DL — SIGNIFICANT CHANGE UP (ref 0.5–1.3)
CREAT SERPL-MCNC: 1.34 MG/DL — HIGH (ref 0.5–1.3)
EGFR: 58 ML/MIN/1.73M2 — LOW
EGFR: 70 ML/MIN/1.73M2 — SIGNIFICANT CHANGE UP
EGFR: 84 ML/MIN/1.73M2 — SIGNIFICANT CHANGE UP
EOSINOPHIL # BLD AUTO: 0.01 K/UL — SIGNIFICANT CHANGE UP (ref 0–0.5)
EOSINOPHIL NFR BLD AUTO: 0.1 % — SIGNIFICANT CHANGE UP (ref 0–6)
ETHANOL SERPL-MCNC: 200 MG/DL — HIGH (ref 0–10)
GLUCOSE SERPL-MCNC: 163 MG/DL — HIGH (ref 70–99)
GLUCOSE SERPL-MCNC: 168 MG/DL — HIGH (ref 70–99)
GLUCOSE SERPL-MCNC: 174 MG/DL — HIGH (ref 70–99)
HCT VFR BLD CALC: 44.1 % — SIGNIFICANT CHANGE UP (ref 39–50)
HGB BLD-MCNC: 15.3 G/DL — SIGNIFICANT CHANGE UP (ref 13–17)
HIV 1 & 2 AB SERPL IA.RAPID: SIGNIFICANT CHANGE UP
IMM GRANULOCYTES NFR BLD AUTO: 0.4 % — SIGNIFICANT CHANGE UP (ref 0–1.5)
LACTATE SERPL-SCNC: 3.8 MMOL/L — HIGH (ref 0.7–2)
LIDOCAIN IGE QN: 141 U/L — SIGNIFICANT CHANGE UP (ref 73–393)
LYMPHOCYTES # BLD AUTO: 1.1 K/UL — SIGNIFICANT CHANGE UP (ref 1–3.3)
LYMPHOCYTES # BLD AUTO: 12.2 % — LOW (ref 13–44)
MAGNESIUM SERPL-MCNC: 2.3 MG/DL — SIGNIFICANT CHANGE UP (ref 1.6–2.6)
MCHC RBC-ENTMCNC: 33.6 PG — SIGNIFICANT CHANGE UP (ref 27–34)
MCHC RBC-ENTMCNC: 34.7 GM/DL — SIGNIFICANT CHANGE UP (ref 32–36)
MCV RBC AUTO: 96.9 FL — SIGNIFICANT CHANGE UP (ref 80–100)
MONOCYTES # BLD AUTO: 0.31 K/UL — SIGNIFICANT CHANGE UP (ref 0–0.9)
MONOCYTES NFR BLD AUTO: 3.4 % — SIGNIFICANT CHANGE UP (ref 2–14)
NEUTROPHILS # BLD AUTO: 7.57 K/UL — HIGH (ref 1.8–7.4)
NEUTROPHILS NFR BLD AUTO: 83.7 % — HIGH (ref 43–77)
NRBC # BLD: 0 /100 WBCS — SIGNIFICANT CHANGE UP (ref 0–0)
PLATELET # BLD AUTO: 159 K/UL — SIGNIFICANT CHANGE UP (ref 150–400)
POTASSIUM SERPL-MCNC: 4.2 MMOL/L — SIGNIFICANT CHANGE UP (ref 3.5–5.3)
POTASSIUM SERPL-MCNC: 4.2 MMOL/L — SIGNIFICANT CHANGE UP (ref 3.5–5.3)
POTASSIUM SERPL-MCNC: 4.6 MMOL/L — SIGNIFICANT CHANGE UP (ref 3.5–5.3)
POTASSIUM SERPL-SCNC: 4.2 MMOL/L — SIGNIFICANT CHANGE UP (ref 3.5–5.3)
POTASSIUM SERPL-SCNC: 4.2 MMOL/L — SIGNIFICANT CHANGE UP (ref 3.5–5.3)
POTASSIUM SERPL-SCNC: 4.6 MMOL/L — SIGNIFICANT CHANGE UP (ref 3.5–5.3)
PROT SERPL-MCNC: 10 G/DL — HIGH (ref 6–8.3)
RBC # BLD: 4.55 M/UL — SIGNIFICANT CHANGE UP (ref 4.2–5.8)
RBC # FLD: 13.1 % — SIGNIFICANT CHANGE UP (ref 10.3–14.5)
SARS-COV-2 RNA SPEC QL NAA+PROBE: SIGNIFICANT CHANGE UP
SODIUM SERPL-SCNC: 134 MMOL/L — LOW (ref 135–145)
SODIUM SERPL-SCNC: 136 MMOL/L — SIGNIFICANT CHANGE UP (ref 135–145)
SODIUM SERPL-SCNC: 140 MMOL/L — SIGNIFICANT CHANGE UP (ref 135–145)
WBC # BLD: 9.05 K/UL — SIGNIFICANT CHANGE UP (ref 3.8–10.5)
WBC # FLD AUTO: 9.05 K/UL — SIGNIFICANT CHANGE UP (ref 3.8–10.5)

## 2022-04-08 PROCEDURE — 99285 EMERGENCY DEPT VISIT HI MDM: CPT

## 2022-04-08 PROCEDURE — 74177 CT ABD & PELVIS W/CONTRAST: CPT | Mod: 26,MA

## 2022-04-08 PROCEDURE — 71045 X-RAY EXAM CHEST 1 VIEW: CPT | Mod: 26

## 2022-04-08 RX ORDER — HALOPERIDOL DECANOATE 100 MG/ML
5 INJECTION INTRAMUSCULAR ONCE
Refills: 0 | Status: COMPLETED | OUTPATIENT
Start: 2022-04-08 | End: 2022-04-08

## 2022-04-08 RX ORDER — LIDOCAINE 4 G/100G
10 CREAM TOPICAL ONCE
Refills: 0 | Status: COMPLETED | OUTPATIENT
Start: 2022-04-08 | End: 2022-04-08

## 2022-04-08 RX ORDER — ONDANSETRON 8 MG/1
4 TABLET, FILM COATED ORAL ONCE
Refills: 0 | Status: COMPLETED | OUTPATIENT
Start: 2022-04-08 | End: 2022-04-08

## 2022-04-08 RX ORDER — ONDANSETRON 8 MG/1
4 TABLET, FILM COATED ORAL EVERY 6 HOURS
Refills: 0 | Status: DISCONTINUED | OUTPATIENT
Start: 2022-04-08 | End: 2022-04-12

## 2022-04-08 RX ORDER — SODIUM CHLORIDE 9 MG/ML
1000 INJECTION INTRAMUSCULAR; INTRAVENOUS; SUBCUTANEOUS ONCE
Refills: 0 | Status: COMPLETED | OUTPATIENT
Start: 2022-04-08 | End: 2022-04-08

## 2022-04-08 RX ORDER — ENOXAPARIN SODIUM 100 MG/ML
40 INJECTION SUBCUTANEOUS EVERY 24 HOURS
Refills: 0 | Status: DISCONTINUED | OUTPATIENT
Start: 2022-04-08 | End: 2022-04-12

## 2022-04-08 RX ORDER — SODIUM CHLORIDE 9 MG/ML
1000 INJECTION INTRAMUSCULAR; INTRAVENOUS; SUBCUTANEOUS
Refills: 0 | Status: DISCONTINUED | OUTPATIENT
Start: 2022-04-08 | End: 2022-04-12

## 2022-04-08 RX ORDER — INSULIN LISPRO 100/ML
VIAL (ML) SUBCUTANEOUS EVERY 6 HOURS
Refills: 0 | Status: DISCONTINUED | OUTPATIENT
Start: 2022-04-08 | End: 2022-04-10

## 2022-04-08 RX ORDER — THIAMINE MONONITRATE (VIT B1) 100 MG
500 TABLET ORAL EVERY 24 HOURS
Refills: 0 | Status: DISCONTINUED | OUTPATIENT
Start: 2022-04-08 | End: 2022-04-09

## 2022-04-08 RX ORDER — MULTIVIT-MIN/FERROUS GLUCONATE 9 MG/15 ML
1 LIQUID (ML) ORAL DAILY
Refills: 0 | Status: DISCONTINUED | OUTPATIENT
Start: 2022-04-08 | End: 2022-04-12

## 2022-04-08 RX ORDER — FOLIC ACID 0.8 MG
1 TABLET ORAL DAILY
Refills: 0 | Status: DISCONTINUED | OUTPATIENT
Start: 2022-04-08 | End: 2022-04-12

## 2022-04-08 RX ORDER — FAMOTIDINE 10 MG/ML
20 INJECTION INTRAVENOUS ONCE
Refills: 0 | Status: COMPLETED | OUTPATIENT
Start: 2022-04-08 | End: 2022-04-08

## 2022-04-08 RX ORDER — PANTOPRAZOLE SODIUM 20 MG/1
40 TABLET, DELAYED RELEASE ORAL DAILY
Refills: 0 | Status: DISCONTINUED | OUTPATIENT
Start: 2022-04-08 | End: 2022-04-12

## 2022-04-08 RX ORDER — PANTOPRAZOLE SODIUM 20 MG/1
40 TABLET, DELAYED RELEASE ORAL
Refills: 0 | Status: DISCONTINUED | OUTPATIENT
Start: 2022-04-08 | End: 2022-04-08

## 2022-04-08 RX ADMIN — Medication 1 MILLIGRAM(S): at 22:55

## 2022-04-08 RX ADMIN — FAMOTIDINE 20 MILLIGRAM(S): 10 INJECTION INTRAVENOUS at 15:47

## 2022-04-08 RX ADMIN — LIDOCAINE 10 MILLILITER(S): 4 CREAM TOPICAL at 15:47

## 2022-04-08 RX ADMIN — ONDANSETRON 4 MILLIGRAM(S): 8 TABLET, FILM COATED ORAL at 17:19

## 2022-04-08 RX ADMIN — SODIUM CHLORIDE 1000 MILLILITER(S): 9 INJECTION INTRAMUSCULAR; INTRAVENOUS; SUBCUTANEOUS at 15:47

## 2022-04-08 RX ADMIN — Medication 30 MILLILITER(S): at 15:47

## 2022-04-08 RX ADMIN — HALOPERIDOL DECANOATE 5 MILLIGRAM(S): 100 INJECTION INTRAMUSCULAR at 18:25

## 2022-04-08 RX ADMIN — SODIUM CHLORIDE 1000 MILLILITER(S): 9 INJECTION INTRAMUSCULAR; INTRAVENOUS; SUBCUTANEOUS at 22:35

## 2022-04-08 RX ADMIN — LIDOCAINE 10 MILLILITER(S): 4 CREAM TOPICAL at 20:48

## 2022-04-08 RX ADMIN — ONDANSETRON 4 MILLIGRAM(S): 8 TABLET, FILM COATED ORAL at 15:47

## 2022-04-08 RX ADMIN — Medication 30 MILLILITER(S): at 20:48

## 2022-04-08 NOTE — ED ADULT TRIAGE NOTE - CHIEF COMPLAINT QUOTE
alcohol intoxication x today, no appetite x 3 days alcohol intoxication x today, no appetite x 3 days, + vomiting

## 2022-04-08 NOTE — ED PROVIDER NOTE - OBJECTIVE STATEMENT
66 year old male with a PMHx of HTN, diabetes mellitus, and alcohol abuse presenting to the ED with no alcoholic intoxication and chief complaint of vomiting and loss of appetite for the past 3 days. Patient was admitted 3 weeks ago for 3 days due to alcohol withdrawal. Patient states that his last alcoholic drink was a tequila drink yesterday morning (>24 hours ago). Patient states he felt nauseous and started vomiting after the drink. Patient says he has epigastric upper abdominal discomfort. Patient denies fever, chills, diarrhea, or other GIU symptoms. Patient has no neuro symptoms.   NKDA.

## 2022-04-08 NOTE — CHART NOTE - NSCHARTNOTEFT_GEN_A_CORE
Pt is a 66 year old Sri Lankan speaking male with pmhx of  HTN, diabetes mellitus, and alcohol abuse . Pt was brought to the ED today with complain of  alcohol  intoxication, vomiting and loss of appetite x3days. Deanne met with Pt at bedside re consult, he appeared alert and orinetedx3. , Norberto # [id number] used. Deanne introduced self, role / purpose of visit explained. Pt participates in sbirt screening and admits to excessive alcohol intake (5/6 shots of tequila almost everyday). Benefits of Inpt SA treatment (detox /rehab) explained but declined active referral to treatment. Emotional support and SA resources provided. Pt is a 66 year old Libyan speaking male with pmhx of  HTN, diabetes mellitus, and alcohol abuse . Pt was brought to the ED today with complain of  alcohol  intoxication, vomiting and loss of appetite x3days. Deanne met with Pt at bedside re consult, he appeared alert and orinetedx3. , Norberto # [id number] used. Deanne introduced self, role / purpose of visit explained. Pt participates in sbirt screening and admits to excessive alcohol intake (5/6 shots of tequila almost everyday). Benefits of Inpt SA treatment (detox /rehab) explained but declined active referral to treatment. Emotional support and SA resources provided. Physician updated and d/c disposition unknown.

## 2022-04-08 NOTE — ED ADULT TRIAGE NOTE - SPO2 (%)
Goal Outcome Evaluation:  Plan of Care Reviewed With: patient           Outcome Summary: OT Eval completed on this date as co-eval with PT. Pt pleasant and agreeable to therapy. Bed Mobility: Independent Transfers: Independent Grooming: Independent LBD: Independent Funct Mob: Independent. Pt demos no needs which required skilled OT intervention at this time. Recommend d/c home with assist.   95

## 2022-04-08 NOTE — ED ADULT NURSE NOTE - NSIMPLEMENTINTERV_GEN_ALL_ED
Implemented All Universal Safety Interventions:  [place] to call system. Call bell, personal items and telephone within reach. Instruct patient to call for assistance. Room bathroom lighting operational. Non-slip footwear when patient is off stretcher. Physically safe environment: no spills, clutter or unnecessary equipment. Stretcher in lowest position, wheels locked, appropriate side rails in place.

## 2022-04-08 NOTE — ED PROVIDER NOTE - PROGRESS NOTE DETAILS
Still vomiting green contents after 2x Zofran 4mg. Abdomen w min TTP to epigastric area. No past surgical hx. EKG w sinus tachy but normal intervals. Will give Haldol. Alcohol level 200, low suspicion for withdrawal. Repeat BMP still w low bicarb and high AG, will add-on ketones. Will get CT abdomen, low threshold for admission at this point. Labs repeated, still w increased AG and worsening bicarb. Also, lactate of 3.8. Spoke w Dr Hendricks (admitted him 3 wks ago), agree w admission. MAR aware. Will give more fluids, start CIWA protocol.

## 2022-04-08 NOTE — H&P ADULT - ASSESSMENT
Pt is a 65 yo M with PMH of Alcohol use disorder, HTN, DM coming to the ED for vomiting associated with epigastric pain. Admitted for alcohol withdrawal.

## 2022-04-08 NOTE — ED PROVIDER NOTE - CONSTITUTIONAL, MLM
Uncomfortable from nausea and vomiting. Well appearing, awake, alert, oriented to person, place, time/situation and in no apparent distress. normal...

## 2022-04-08 NOTE — H&P ADULT - NSHPPHYSICALEXAM_GEN_ALL_CORE
Vital Signs Last 24 Hrs  T(C): 36.8 (09 Apr 2022 01:58), Max: 36.9 (08 Apr 2022 16:16)  T(F): 98.2 (09 Apr 2022 01:58), Max: 98.4 (08 Apr 2022 16:16)  HR: 114 (09 Apr 2022 01:58) (101 - 117)  BP: 150/65 (09 Apr 2022 01:58) (124/80 - 171/81)  RR: 20 (09 Apr 2022 01:58) (20 - 978)  SpO2: 94% (09 Apr 2022 01:58) (93% - 95%)    GENERAL: Drowsy  HEAD:  Atraumatic, Normocephalic  EYES: EOMI, PERRLA, conjunctiva and sclera clear  ENT: Moist mucous membranes  NECK: Supple, No JVD  CHEST/LUNG: Clear to auscultation bilaterally; No rales, rhonchi, wheezing, or rubs. Unlabored respirations  HEART: Regular rate and rhythm; No murmurs, rubs, or gallops  ABDOMEN: Bowel sounds present; Soft, Nontender, Nondistended. No hepatomegaly  EXTREMITIES:  2+ Peripheral Pulses, brisk capillary refill. No clubbing, cyanosis, or edema  NERVOUS SYSTEM:  Alert & Oriented X3, speech clear. No deficits   MSK: FROM all 4 extremities, full and equal strength  SKIN: No rashes or lesions

## 2022-04-08 NOTE — ED PROVIDER NOTE - CLINICAL SUMMARY MEDICAL DECISION MAKING FREE TEXT BOX
66 year old male with a PMHx of HTN, diabetes mellitus, and alcohol abuse presenting to the ED, with no alcohol intoxication, with chief complaint of vomiting and loss of appetite for the past 3 days. Symptoms consists of alcohol side effect vs beginning of withdrawal vs intoxicate acute. Will do blood labs, treat symptoms, hydrate, and reevaluate.

## 2022-04-08 NOTE — H&P ADULT - PROBLEM SELECTOR PLAN 3
Patient has hx of alcohol use disorder. States that he drinks one bottle of tequila and couple of bottles of beer every other night.   In ED, patient tachycardic, visible tremor on arm extension, and tachycardia.    started on CIWA protocol   started on Ativan master order. Starting with standing 2mg q4h for 6 doses, then will taper down  started on folate, thiamine, and MVI.   SW consult for Alcohol use disorder  Keep NPO as patient unable to tolerate PO.

## 2022-04-08 NOTE — H&P ADULT - HISTORY OF PRESENT ILLNESS
66 year old male with a past medical history of HTN, DM, and alcohol use disorder coming to ED for vomit ting Patient states that he started having multiple episodes of non bloody vomiting that started yesterday morning. He describes the color of the vomit as "wine color" but denies any blood. He has been unable to eat or drink anything without vomiting it out. He states that this has happened to him in the past. He is also complaining of epigastric pain that is provoked by vomiting. Patient also states that he fell after slipping on the stairs but did not lose consciousness. He states that he drinks either 1 bottle of bourbon or 2 beers occasionally Denies drinking alcohol everyday. He is also endorsing seeing a robert tree in his house even though there is no robert tree. Denies any auditory hallucinations. Denies any headaches, dizziness, fevers, chills, cough, chest pain, SOB, hematuria, dysuria, rashes, and weakness. Pt is a 67 yo M with PMH of Alcohol use disorder, HTN, DM coming to the ED for vomiting associated with epigastric pain. Patient states that he started having multiple episodes of non bloody vomiting for the last three days. He has been unable to eat or drink anything without vomiting it out. He is also complaining of epigastric pain that is provoked by vomiting. He states that he drinks 1 bottle of tequila or couple of beers. Last drink was yesterday morning. He gets admitted to [facility] almost every month for the same reason. No history of headaches, dizziness, fevers, chills, cough, chest pain, SOB, hematuria, dysuria, rashes, and weakness.

## 2022-04-08 NOTE — SBIRT NOTE ADULT - NSSBIRTBRIEFINTDET_GEN_A_CORE
Pt admits to excessive alcohol intake(5/6 shots of tequila almost everyday). Benefits of Inpt SA treatment (detox /rehab) explained but he declined active referral to treatment. Emotional support and SA resources provided.

## 2022-04-08 NOTE — H&P ADULT - PROBLEM SELECTOR PLAN 4
Pt has h/o DM  at home on metformin and prandin  started on insulin sliding scale Q6h since pt is NPO  follow A1c

## 2022-04-08 NOTE — ED PROVIDER NOTE - CARE PLAN
1 Principal Discharge DX:	Intractable vomiting  Secondary Diagnosis:	Starvation ketoacidosis  Secondary Diagnosis:	Alcohol withdrawal

## 2022-04-08 NOTE — H&P ADULT - PROBLEM SELECTOR PLAN 2
Likely due to Alcohol Ketoacidosis or starvation ketoacidosis  patient received 1L NS Bolus in ED.   HCO3 15  AG 25  Moderate Acetone in serum  f/u repeat BMP. Likely due to Alcohol Ketoacidosis or starvation ketoacidosis  patient received 1L NS Bolus in ED.   Lactate 3.8  HCO3 15  AG 25  Moderate Acetone in serum  f/u repeat BMP.

## 2022-04-08 NOTE — H&P ADULT - PROBLEM SELECTOR PLAN 1
Patient coming to ED c/o of vomit ting. Unable to eat or drink without vomiting. Denies any blood in vomit.  CT abdomen showing esophageal wall thickening similar to prior CT scan  In ED received Zofran, 1L NS.   Keep patient NPO  Zofran for nausea   started on IVF 100cc/hr   on Protonix IV   follow up BMP for electrolyte derangements due to vomiting. Patient coming to ED c/o of vomiting with associated epigastric pain  Vitals [place]  CT abdomen showing esophageal wall thickening similar to prior CT scan  In ED received Zofran, 1L NS.   Keep patient NPO  Zofran for nausea   started on IVF 100cc/hr   on Protonix IV   follow up BMP for electrolyte derangements due to vomiting. Patient coming to ED c/o of vomiting with associated epigastric pain  Vitals [place]  CT abdomen showing esophageal wall thickening similar to prior CT scan  In ED received Zofran, 1L NS.   Keep patient NPO  Zofran for nausea   started on IVF 100cc/hr   on Protonix IV   follow up BMP for electrolyte derangements due to vomiting.  GI Dr. Sanabria consulted

## 2022-04-09 DIAGNOSIS — Z29.9 ENCOUNTER FOR PROPHYLACTIC MEASURES, UNSPECIFIED: ICD-10-CM

## 2022-04-09 DIAGNOSIS — R11.10 VOMITING, UNSPECIFIED: ICD-10-CM

## 2022-04-09 DIAGNOSIS — E11.9 TYPE 2 DIABETES MELLITUS WITHOUT COMPLICATIONS: ICD-10-CM

## 2022-04-09 DIAGNOSIS — F10.239 ALCOHOL DEPENDENCE WITH WITHDRAWAL, UNSPECIFIED: ICD-10-CM

## 2022-04-09 DIAGNOSIS — E87.2 ACIDOSIS: ICD-10-CM

## 2022-04-09 LAB
A1C WITH ESTIMATED AVERAGE GLUCOSE RESULT: 6.1 % — HIGH (ref 4–5.6)
ALBUMIN SERPL ELPH-MCNC: 3.1 G/DL — LOW (ref 3.5–5)
ALBUMIN SERPL ELPH-MCNC: 3.2 G/DL — LOW (ref 3.5–5)
ALP SERPL-CCNC: 73 U/L — SIGNIFICANT CHANGE UP (ref 40–120)
ALP SERPL-CCNC: 75 U/L — SIGNIFICANT CHANGE UP (ref 40–120)
ALT FLD-CCNC: 48 U/L DA — SIGNIFICANT CHANGE UP (ref 10–60)
ALT FLD-CCNC: 48 U/L DA — SIGNIFICANT CHANGE UP (ref 10–60)
ANION GAP SERPL CALC-SCNC: 16 MMOL/L — SIGNIFICANT CHANGE UP (ref 5–17)
ANION GAP SERPL CALC-SCNC: 21 MMOL/L — HIGH (ref 5–17)
AST SERPL-CCNC: 63 U/L — HIGH (ref 10–40)
AST SERPL-CCNC: 69 U/L — HIGH (ref 10–40)
BASOPHILS # BLD AUTO: 0.01 K/UL — SIGNIFICANT CHANGE UP (ref 0–0.2)
BASOPHILS NFR BLD AUTO: 0.2 % — SIGNIFICANT CHANGE UP (ref 0–2)
BILIRUB DIRECT SERPL-MCNC: 0.5 MG/DL — HIGH (ref 0–0.3)
BILIRUB INDIRECT FLD-MCNC: 0.8 MG/DL — SIGNIFICANT CHANGE UP (ref 0.2–1)
BILIRUB SERPL-MCNC: 1.3 MG/DL — HIGH (ref 0.2–1.2)
BILIRUB SERPL-MCNC: 1.6 MG/DL — HIGH (ref 0.2–1.2)
BUN SERPL-MCNC: 27 MG/DL — HIGH (ref 7–18)
BUN SERPL-MCNC: 28 MG/DL — HIGH (ref 7–18)
CALCIUM SERPL-MCNC: 7.6 MG/DL — LOW (ref 8.4–10.5)
CALCIUM SERPL-MCNC: 8 MG/DL — LOW (ref 8.4–10.5)
CHLORIDE SERPL-SCNC: 103 MMOL/L — SIGNIFICANT CHANGE UP (ref 96–108)
CHLORIDE SERPL-SCNC: 105 MMOL/L — SIGNIFICANT CHANGE UP (ref 96–108)
CHOLEST SERPL-MCNC: 213 MG/DL — HIGH
CO2 SERPL-SCNC: 16 MMOL/L — LOW (ref 22–31)
CO2 SERPL-SCNC: 20 MMOL/L — LOW (ref 22–31)
CREAT SERPL-MCNC: 0.94 MG/DL — SIGNIFICANT CHANGE UP (ref 0.5–1.3)
CREAT SERPL-MCNC: 0.98 MG/DL — SIGNIFICANT CHANGE UP (ref 0.5–1.3)
EGFR: 84 ML/MIN/1.73M2 — SIGNIFICANT CHANGE UP
EGFR: 88 ML/MIN/1.73M2 — SIGNIFICANT CHANGE UP
EOSINOPHIL # BLD AUTO: 0 K/UL — SIGNIFICANT CHANGE UP (ref 0–0.5)
EOSINOPHIL NFR BLD AUTO: 0 % — SIGNIFICANT CHANGE UP (ref 0–6)
ESTIMATED AVERAGE GLUCOSE: 128 MG/DL — HIGH (ref 68–114)
GLUCOSE BLDC GLUCOMTR-MCNC: 116 MG/DL — HIGH (ref 70–99)
GLUCOSE BLDC GLUCOMTR-MCNC: 139 MG/DL — HIGH (ref 70–99)
GLUCOSE BLDC GLUCOMTR-MCNC: 146 MG/DL — HIGH (ref 70–99)
GLUCOSE BLDC GLUCOMTR-MCNC: 151 MG/DL — HIGH (ref 70–99)
GLUCOSE BLDC GLUCOMTR-MCNC: 154 MG/DL — HIGH (ref 70–99)
GLUCOSE BLDC GLUCOMTR-MCNC: 191 MG/DL — HIGH (ref 70–99)
GLUCOSE BLDC GLUCOMTR-MCNC: 55 MG/DL — LOW (ref 70–99)
GLUCOSE SERPL-MCNC: 149 MG/DL — HIGH (ref 70–99)
GLUCOSE SERPL-MCNC: 167 MG/DL — HIGH (ref 70–99)
HCT VFR BLD CALC: 32.9 % — LOW (ref 39–50)
HDLC SERPL-MCNC: 65 MG/DL — SIGNIFICANT CHANGE UP
HGB BLD-MCNC: 11.4 G/DL — LOW (ref 13–17)
IMM GRANULOCYTES NFR BLD AUTO: 0.4 % — SIGNIFICANT CHANGE UP (ref 0–1.5)
LACTATE SERPL-SCNC: 1.2 MMOL/L — SIGNIFICANT CHANGE UP (ref 0.7–2)
LIPID PNL WITH DIRECT LDL SERPL: 128 MG/DL — HIGH
LYMPHOCYTES # BLD AUTO: 0.82 K/UL — LOW (ref 1–3.3)
LYMPHOCYTES # BLD AUTO: 15.4 % — SIGNIFICANT CHANGE UP (ref 13–44)
MAGNESIUM SERPL-MCNC: 1.9 MG/DL — SIGNIFICANT CHANGE UP (ref 1.6–2.6)
MAGNESIUM SERPL-MCNC: 2.2 MG/DL — SIGNIFICANT CHANGE UP (ref 1.6–2.6)
MCHC RBC-ENTMCNC: 34 PG — SIGNIFICANT CHANGE UP (ref 27–34)
MCHC RBC-ENTMCNC: 34.7 GM/DL — SIGNIFICANT CHANGE UP (ref 32–36)
MCV RBC AUTO: 98.2 FL — SIGNIFICANT CHANGE UP (ref 80–100)
MONOCYTES # BLD AUTO: 0.46 K/UL — SIGNIFICANT CHANGE UP (ref 0–0.9)
MONOCYTES NFR BLD AUTO: 8.6 % — SIGNIFICANT CHANGE UP (ref 2–14)
NEUTROPHILS # BLD AUTO: 4.02 K/UL — SIGNIFICANT CHANGE UP (ref 1.8–7.4)
NEUTROPHILS NFR BLD AUTO: 75.4 % — SIGNIFICANT CHANGE UP (ref 43–77)
NON HDL CHOLESTEROL: 148 MG/DL — HIGH
NRBC # BLD: 0 /100 WBCS — SIGNIFICANT CHANGE UP (ref 0–0)
PHOSPHATE SERPL-MCNC: 2.2 MG/DL — LOW (ref 2.5–4.5)
PHOSPHATE SERPL-MCNC: 3.1 MG/DL — SIGNIFICANT CHANGE UP (ref 2.5–4.5)
PLATELET # BLD AUTO: 101 K/UL — LOW (ref 150–400)
POTASSIUM SERPL-MCNC: 3.9 MMOL/L — SIGNIFICANT CHANGE UP (ref 3.5–5.3)
POTASSIUM SERPL-MCNC: 4.1 MMOL/L — SIGNIFICANT CHANGE UP (ref 3.5–5.3)
POTASSIUM SERPL-SCNC: 3.9 MMOL/L — SIGNIFICANT CHANGE UP (ref 3.5–5.3)
POTASSIUM SERPL-SCNC: 4.1 MMOL/L — SIGNIFICANT CHANGE UP (ref 3.5–5.3)
PROT SERPL-MCNC: 7.1 G/DL — SIGNIFICANT CHANGE UP (ref 6–8.3)
PROT SERPL-MCNC: 7.2 G/DL — SIGNIFICANT CHANGE UP (ref 6–8.3)
RBC # BLD: 3.35 M/UL — LOW (ref 4.2–5.8)
RBC # FLD: 13.2 % — SIGNIFICANT CHANGE UP (ref 10.3–14.5)
SODIUM SERPL-SCNC: 140 MMOL/L — SIGNIFICANT CHANGE UP (ref 135–145)
SODIUM SERPL-SCNC: 141 MMOL/L — SIGNIFICANT CHANGE UP (ref 135–145)
TRIGL SERPL-MCNC: 100 MG/DL — SIGNIFICANT CHANGE UP
TSH SERPL-MCNC: 1.3 UU/ML — SIGNIFICANT CHANGE UP (ref 0.34–4.82)
WBC # BLD: 5.33 K/UL — SIGNIFICANT CHANGE UP (ref 3.8–10.5)
WBC # FLD AUTO: 5.33 K/UL — SIGNIFICANT CHANGE UP (ref 3.8–10.5)

## 2022-04-09 RX ORDER — THIAMINE MONONITRATE (VIT B1) 100 MG
500 TABLET ORAL EVERY 8 HOURS
Refills: 0 | Status: COMPLETED | OUTPATIENT
Start: 2022-04-09 | End: 2022-04-12

## 2022-04-09 RX ORDER — POTASSIUM CHLORIDE 20 MEQ
10 PACKET (EA) ORAL
Refills: 0 | Status: DISCONTINUED | OUTPATIENT
Start: 2022-04-09 | End: 2022-04-09

## 2022-04-09 RX ORDER — POTASSIUM PHOSPHATE, MONOBASIC POTASSIUM PHOSPHATE, DIBASIC 236; 224 MG/ML; MG/ML
15 INJECTION, SOLUTION INTRAVENOUS ONCE
Refills: 0 | Status: COMPLETED | OUTPATIENT
Start: 2022-04-09 | End: 2022-04-09

## 2022-04-09 RX ADMIN — Medication 105 MILLIGRAM(S): at 05:44

## 2022-04-09 RX ADMIN — Medication 1: at 17:42

## 2022-04-09 RX ADMIN — Medication 105 MILLIGRAM(S): at 14:12

## 2022-04-09 RX ADMIN — Medication 1 MILLIGRAM(S): at 12:42

## 2022-04-09 RX ADMIN — PANTOPRAZOLE SODIUM 40 MILLIGRAM(S): 20 TABLET, DELAYED RELEASE ORAL at 12:42

## 2022-04-09 RX ADMIN — Medication 2 MILLIGRAM(S): at 05:44

## 2022-04-09 RX ADMIN — ENOXAPARIN SODIUM 40 MILLIGRAM(S): 100 INJECTION SUBCUTANEOUS at 05:44

## 2022-04-09 RX ADMIN — Medication 2 MILLIGRAM(S): at 17:40

## 2022-04-09 RX ADMIN — SODIUM CHLORIDE 100 MILLILITER(S): 9 INJECTION INTRAMUSCULAR; INTRAVENOUS; SUBCUTANEOUS at 00:51

## 2022-04-09 RX ADMIN — Medication 1.5 MILLIGRAM(S): at 21:44

## 2022-04-09 RX ADMIN — SODIUM CHLORIDE 100 MILLILITER(S): 9 INJECTION INTRAMUSCULAR; INTRAVENOUS; SUBCUTANEOUS at 18:56

## 2022-04-09 RX ADMIN — Medication 2 MILLIGRAM(S): at 11:02

## 2022-04-09 RX ADMIN — Medication 2 MILLIGRAM(S): at 00:50

## 2022-04-09 RX ADMIN — Medication 1: at 01:14

## 2022-04-09 RX ADMIN — Medication 1 TABLET(S): at 12:42

## 2022-04-09 RX ADMIN — Medication 105 MILLIGRAM(S): at 22:36

## 2022-04-09 RX ADMIN — POTASSIUM PHOSPHATE, MONOBASIC POTASSIUM PHOSPHATE, DIBASIC 62.5 MILLIMOLE(S): 236; 224 INJECTION, SOLUTION INTRAVENOUS at 08:25

## 2022-04-09 NOTE — CONSULT NOTE ADULT - ASSESSMENT
A. The etiology for vomiting in this patient is most likely due to:  1. Alcoholic gastritis  2. Peptic ulcer disease  3. Pancreatitis unlikely  4. Biliary colic unlikely    B. Thickened distal esophageal wall most likely due to:  1. Esophagitis  2. Esophageal ulcer    C. Alcoholism    Suggestions:    1. Protonix 40mg daily  2. Avoid NSAID  3. Anti reflux measure  4. EGD out patient  5. DT's precaution  6. DVT prophylaxis

## 2022-04-09 NOTE — CONSULT NOTE ADULT - NEGATIVE ENMT SYMPTOMS
no hearing difficulty/no ear pain/no tinnitus/no sinus symptoms/no gum bleeding/no dry mouth/no throat pain/no dysphagia

## 2022-04-09 NOTE — CONSULT NOTE ADULT - NEGATIVE OPHTHALMOLOGIC SYMPTOMS
no diplopia/no photophobia/no lacrimation L/no lacrimation R/no blurred vision L/no blurred vision R/no discharge L

## 2022-04-09 NOTE — PROGRESS NOTE ADULT - PROBLEM SELECTOR PLAN 2
Likely due to Alcohol Ketoacidosis or starvation ketoacidosis  patient received 1L NS Bolus in ED.   Lactate 3.8>> 1.2   AG 25 on adm  Moderate Acetone in serum  Monitor BMP and AG

## 2022-04-09 NOTE — CONSULT NOTE ADULT - RS GEN PE MLT RESP DETAILS PC
airway patent/breath sounds equal/good air movement/respirations non-labored/clear to auscultation bilaterally/no rhonchi

## 2022-04-09 NOTE — PROGRESS NOTE ADULT - SUBJECTIVE AND OBJECTIVE BOX
PGY-1 Progress Note discussed with attending    PAGER #: [[phone]] TILL 5:00 PM  PLEASE CONTACT ON CALL TEAM:  - On Call Team (Please refer to Karen) FROM 5:00 PM - 8:30PM  - Nightfloat Team FROM 8:30 -7:30 AM    CHIEF COMPLAINT & BRIEF HOSPITAL COURSE:  Pt is a 65 yo M with PMH of Alcohol use disorder, HTN, DM coming to the ED for vomiting associated with epigastric pain. Patient states that he started having multiple episodes of non bloody vomiting for the last three days. He has been unable to eat or drink anything without vomiting it out. He is also complaining of epigastric pain that is provoked by vomiting. He states that he drinks 1 bottle of tequila or couple of beers. Last drink was yesterday morning. He gets admitted to [facility] almost every month for the same reason.     INTERVAL HPI/OVERNIGHT EVENTS:   Patient seen and examined at bedside. No overnight events. No acute complaints this AM.    REVIEW OF SYSTEMS:  CONSTITUTIONAL: No fever, night sweats, weight loss, or fatigue  RESPIRATORY: No cough, wheezing, or hemoptysis; No shortness of breath  CARDIOVASCULAR: No chest pain, palpitations, dizziness, or leg swelling  GASTROINTESTINAL: No abdominal pain. No nausea, vomiting, or hematemesis; No diarrhea or constipation. No melena or hematochezia.  GENITOURINARY: No dysuria or hematuria, no urinary frequency  NEUROLOGICAL: No headaches, memory loss, loss of strength, numbness, or tremors  SKIN: No itching, burning, rashes, or lesions     Vital Signs Last 24 Hrs  T(C): 36.8 (09 Apr 2022 16:17), Max: 37.6 (09 Apr 2022 04:29)  T(F): 98.2 (09 Apr 2022 16:17), Max: 99.7 (09 Apr 2022 04:29)  HR: 97 (09 Apr 2022 16:17) (97 - 123)  BP: 138/69 (09 Apr 2022 16:17) (138/66 - 171/81)  BP(mean): --  RR: 17 (09 Apr 2022 16:17) (17 - 20)  SpO2: 95% (09 Apr 2022 16:17) (93% - 95%)    PHYSICAL EXAMINATION:  GENERAL: NAD, well built  HEAD:  Atraumatic, Normocephalic  EYES:  conjunctiva and sclera clear  NECK: Supple, No JVD, thyroid not examined   CHEST/LUNG: Clear to auscultation. No wheezing, rales, rubs or rhonchi  HEART: Regular rate and rhythm; Clear S1 and S2, No murmurs, rubs, or gallops  ABDOMEN: Soft, Nontender, Nondistended; Bowel sounds present  NERVOUS SYSTEM:  Alert & Oriented X3, CNII-XII intact, no focal neurological deficits   EXTREMITIES:  2+ Peripheral Pulses, No clubbing, cyanosis, or edema  SKIN: warm dry, no lesions noted                           11.4   5.33  )-----------( 101      ( 09 Apr 2022 07:02 )             32.9     04-09    141  |  105  |  27<H>  ----------------------------<  167<H>  3.9   |  20<L>  |  0.98    Ca    8.0<L>      09 Apr 2022 07:02  Phos  2.2     04-09  Mg     2.2     04-09    TPro  7.1  /  Alb  3.2<L>  /  TBili  1.6<H>  /  DBili  x   /  AST  63<H>  /  ALT  48  /  AlkPhos  73  04-09    LIVER FUNCTIONS - ( 09 Apr 2022 07:02 )  Alb: 3.2 g/dL / Pro: 7.1 g/dL / ALK PHOS: 73 U/L / ALT: 48 U/L DA / AST: 63 U/L / GGT: x             CAPILLARY BLOOD GLUCOSE    MEDICATIONS  (STANDING):  enoxaparin Injectable 40 milliGRAM(s) SubCutaneous every 24 hours  folic acid 1 milliGRAM(s) Oral daily  insulin lispro (ADMELOG) corrective regimen sliding scale   SubCutaneous every 6 hours  LORazepam   Injectable   IV Push   LORazepam   Injectable 1.5 milliGRAM(s) IV Push every 4 hours  multivitamin/minerals 1 Tablet(s) Oral daily  pantoprazole  Injectable 40 milliGRAM(s) IV Push daily  sodium chloride 0.9%. 1000 milliLiter(s) (100 mL/Hr) IV Continuous <Continuous>  thiamine IVPB 500 milliGRAM(s) IV Intermittent every 8 hours    MEDICATIONS  (PRN):  LORazepam   Injectable 2 milliGRAM(s) IV Push every 2 hours PRN CIWA-Ar score 8 or greater  ondansetron Injectable 4 milliGRAM(s) IV Push every 6 hours PRN Nausea and/or Vomiting      RADIOLOGY & ADDITIONAL TESTS:

## 2022-04-09 NOTE — CONSULT NOTE ADULT - NEGATIVE GASTROINTESTINAL SYMPTOMS
no nausea/no vomiting/no diarrhea/no constipation/no abdominal pain/no melena/no hematochezia/no steatorrhea

## 2022-04-09 NOTE — PATIENT PROFILE ADULT - FALL HARM RISK - HARM RISK INTERVENTIONS
Assistance with ambulation/Assistance OOB with selected safe patient handling equipment/Communicate Risk of Fall with Harm to all staff/Monitor for mental status changes/Monitor gait and stability/Reinforce activity limits and safety measures with patient and family/Tailored Fall Risk Interventions/Toileting schedule using arm’s reach rule for commode and bathroom/Use of alarms - bed, chair and/or voice tab/Visual Cue: Yellow wristband and red socks/Bed in lowest position, wheels locked, appropriate side rails in place/Call bell, personal items and telephone in reach/Instruct patient to call for assistance before getting out of bed or chair/Non-slip footwear when patient is out of bed/[place] to call system/Physically safe environment - no spills, clutter or unnecessary equipment/Purposeful Proactive Rounding/Room/bathroom lighting operational, light cord in reach

## 2022-04-09 NOTE — CONSULT NOTE ADULT - SUBJECTIVE AND OBJECTIVE BOX
[  ] STAT REQUEST              [ X ] ROUTINE REQUEST    Patient is a 66 year old male with vomiting. GI consulted to evaluate.       HPI:  Pt is a 67 yo M with PMH of Alcohol use disorder, HTN, DM coming to the ED for vomiting associated with epigastric pain. Patient states that he started having multiple episodes of non bloody vomiting for the last three days. He has been unable to eat or drink anything without vomiting it out. He is also complaining of epigastric pain that is provoked by vomiting. He states that he drinks 1 bottle of tequila or couple of beers. Last drink was yesterday morning. He gets admitted to [facility] almost every month for the same reason. No history of headaches, dizziness, fevers, chills, cough, chest pain, SOB, hematuria, dysuria, rashes, and weakness. (2022 23:25)      PAIN MANAGEMENT:  Pain Scale:                2-3 /10  Pain Location:  Epigastric abdominal pain    Prior Colonoscopy:  No prior colonoscopy    PAST MEDICAL HISTORY  EtOH dependence  Diabetes  HTN (hypertension)        PAST SURGICAL HISTORY  No significant past surgical history        Allergies    No Known Allergies    Intolerances  None       MEDICATIONS  (STANDING):  enoxaparin Injectable 40 milliGRAM(s) SubCutaneous every 24 hours  folic acid 1 milliGRAM(s) Oral daily  insulin lispro (ADMELOG) corrective regimen sliding scale   SubCutaneous every 6 hours  LORazepam   Injectable   IV Push   LORazepam   Injectable 1.5 milliGRAM(s) IV Push every 4 hours  multivitamin/minerals 1 Tablet(s) Oral daily  pantoprazole  Injectable 40 milliGRAM(s) IV Push daily  sodium chloride 0.9%. 1000 milliLiter(s) (100 mL/Hr) IV Continuous <Continuous>  thiamine IVPB 500 milliGRAM(s) IV Intermittent every 8 hours    MEDICATIONS  (PRN):  LORazepam   Injectable 2 milliGRAM(s) IV Push every 2 hours PRN CIWA-Ar score 8 or greater  ondansetron Injectable 4 milliGRAM(s) IV Push every 6 hours PRN Nausea and/or Vomiting      SOCIAL HISTORY  Advanced Directives:       [ X ] Full Code       [  ] DNR  Marital Status:         [  ] M      [X  ] S      [  ] D       [  ] W  Children:       [ X ] Yes      [  ] No  Occupation:        [  ] Employed       [ X ] Unemployed       [  ] Retired  Diet:       [X  ] Regular       [  ] PEG feeding          [  ] NG tube feeding  Drug Use:           [ X ] Patient denied          [  ] Yes  Alcohol:           [  ] No             [  ] Yes (socially)         [ X ] Yes (chronic)  Tobacco:           [  ] Yes           [ X ] No    FAMILY HISTORY  [ X ] Heart Disease            [ X ] Diabetes             [ X ] HTN             [  ] Colon Cancer             [  ] Stomach Cancer              [  ] Pancreatic Cancer    VITAL SIGNS   Vital Signs Last 24 Hrs  T(C): 36.8 (2022 16:17), Max: 37.6 (2022 04:29)  T(F): 98.2 (2022 16:17), Max: 99.7 (2022 04:29)  HR: 97 (2022 16:17) (97 - 123)  BP: 138/69 (2022 16:17) (138/66 - 171/81)   RR: 17 (2022 16:17) (17 - 20)  SpO2: 95% (2022 16:17) (93% - 95%)    Daily Weight in k.7 (2022 03:18)       CBC Full  -  ( 2022 07:02 )  WBC Count : 5.33 K/uL  RBC Count : 3.35 M/uL  Hemoglobin : 11.4 g/dL  Hematocrit : 32.9 %  Platelet Count - Automated : 101 K/uL  Mean Cell Volume : 98.2 fl  Mean Cell Hemoglobin : 34.0 pg  Mean Cell Hemoglobin Concentration : 34.7 gm/dL  Auto Neutrophil # : 4.02 K/uL  Auto Lymphocyte # : 0.82 K/uL  Auto Monocyte # : 0.46 K/uL  Auto Eosinophil # : 0.00 K/uL  Auto Basophil # : 0.01 K/uL  Auto Neutrophil % : 75.4 %  Auto Lymphocyte % : 15.4 %  Auto Monocyte % : 8.6 %  Auto Eosinophil % : 0.0 %  Auto Basophil % : 0.2 %      0409    141  |  105  |  27<H>  ----------------------------<  167<H>  3.9   |  20<L>  |  0.98    Ca    8.0<L>      2022 07:02  Phos  2.2     04-09  Mg     2.2     04-09    TPro  7.1  /  Alb  3.2<L>  /  TBili  1.6<H>  /  DBili  x   /  AST  63<H>  /  ALT  48  /  AlkPhos  73  -     OCCUrinalysis (22 @ 01:28)   Glucose Qualitative, Urine: Negative   Blood, Urine: Negative   pH Urine: 6.0   Color: Yellow   Urine Appearance: Clear   Bilirubin: Negative   Ketone - Urine: Large   Specific Gravity: 1.030   Protein, Urine: 30 mg/dL   Urobilinogen: 4   Nitrite: Negative   Leukocyte Esterase Concentration: Negative < from: 12 Lead ECG (22 @ 10:27) >    Ventricular Rate 117 BPM    Atrial Rate 117 BPM    P-R Interval 160 ms    QRS Duration 88 ms    Q-T Interval 326 ms    QTC Calculation(Bazett) 454 ms    P Axis 61 degrees    R Axis 11 degrees    T Axis 39 degrees    Diagnosis Line *** Poor data quality, interpretation may be adversely affected  Sinus tachycardia  Otherwise normal ECG    < end of copied text >      RADIOLOGY/IMAGING                  ACC: [id number] EXAM:  CT ABDOMEN AND PELVIS IC                          PROCEDURE DATE:  2022          INTERPRETATION:  CLINICAL INFORMATION: Epigastric abdominal pain    COMPARISON: 3/13/2022    CONTRAST/COMPLICATIONS:  IV Contrast: [id number]  48 cc administered   52 cc discarded  Oral Contrast: NONE  Complications: None reported at time of study completion    PROCEDURE:  CT of the Abdomen and Pelvis was performed.  Sagittal and coronal reformats were performed.    FINDINGS: The examination is limited by respiratory motion artifact.  LOWER CHEST: Small bilateral atelectasis. Small calcified granuloma in   the left lower lobe.    LIVER: Hepatic steatosis again noted. 2.2 cm rim calcified lesion is   again seen in the right hepatic lobe, unchanged since the previous   abdominal CT dated 2019.  BILE DUCTS: Normal caliber.  GALLBLADDER: No CT evidence for gallstones. Evaluation of the gallbladder   wall is limited by respiratory motion artifact.  SPLEEN: Within normal limits.  PANCREAS: Within normal limits.  ADRENALS: Within normal limits.  KIDNEYS/URETERS: Within normal limits.    BLADDER: Within normal limits.  REPRODUCTIVE ORGANS: The prostate is mildly enlarged.    BOWEL: No bowel obstruction. Appendix within normal limits. Colonic   diverticulosis without evidence of diverticulitis. Questionable distal   esophageal mural thickening is again noted. If clinically indicated, EGD   may be pursued for further evaluation.  PERITONEUM: No free air or ascites.  VESSELS:Calcified atherosclerotic disease.  RETROPERITONEUM/LYMPH NODES: No lymphadenopathy.  ABDOMINAL WALL: Small fat-containing inguinal hernias bilaterally.  BONES: Mild degenerative spondylosis.    IMPRESSION:  No bowel obstruction. Appendix within normal limits. Colonic   diverticulosis without evidence of diverticulitis. Questionable distal   esophageal mural thickening is again noted. If clinically indicated, EGD   may be pursued for further evaluation.

## 2022-04-09 NOTE — PROGRESS NOTE ADULT - PROBLEM SELECTOR PLAN 3
Patient has hx of alcohol use disorder. States that he drinks one bottle of tequila and couple of bottles of beer every other night.   In ED, patient tachycardic, visible tremor on arm extension, and tachycardia.    started on CIWA protocol   started on Ativan master order. Starting with standing 2mg q4h for 6 doses, then will taper down  c/w folate, thiamine, and MVI.   SW consult for Alcohol use disorder  Advance diet as tolerated

## 2022-04-10 LAB
ALBUMIN SERPL ELPH-MCNC: 2.8 G/DL — LOW (ref 3.5–5)
ALP SERPL-CCNC: 66 U/L — SIGNIFICANT CHANGE UP (ref 40–120)
ALT FLD-CCNC: 38 U/L DA — SIGNIFICANT CHANGE UP (ref 10–60)
ANION GAP SERPL CALC-SCNC: 10 MMOL/L — SIGNIFICANT CHANGE UP (ref 5–17)
AST SERPL-CCNC: 49 U/L — HIGH (ref 10–40)
BASOPHILS # BLD AUTO: 0.01 K/UL — SIGNIFICANT CHANGE UP (ref 0–0.2)
BASOPHILS NFR BLD AUTO: 0.3 % — SIGNIFICANT CHANGE UP (ref 0–2)
BILIRUB SERPL-MCNC: 1.3 MG/DL — HIGH (ref 0.2–1.2)
BUN SERPL-MCNC: 16 MG/DL — SIGNIFICANT CHANGE UP (ref 7–18)
CALCIUM SERPL-MCNC: 8.2 MG/DL — LOW (ref 8.4–10.5)
CHLORIDE SERPL-SCNC: 101 MMOL/L — SIGNIFICANT CHANGE UP (ref 96–108)
CO2 SERPL-SCNC: 25 MMOL/L — SIGNIFICANT CHANGE UP (ref 22–31)
CREAT SERPL-MCNC: 0.54 MG/DL — SIGNIFICANT CHANGE UP (ref 0.5–1.3)
EGFR: 109 ML/MIN/1.73M2 — SIGNIFICANT CHANGE UP
EOSINOPHIL # BLD AUTO: 0.09 K/UL — SIGNIFICANT CHANGE UP (ref 0–0.5)
EOSINOPHIL NFR BLD AUTO: 2.4 % — SIGNIFICANT CHANGE UP (ref 0–6)
GLUCOSE BLDC GLUCOMTR-MCNC: 132 MG/DL — HIGH (ref 70–99)
GLUCOSE BLDC GLUCOMTR-MCNC: 153 MG/DL — HIGH (ref 70–99)
GLUCOSE BLDC GLUCOMTR-MCNC: 163 MG/DL — HIGH (ref 70–99)
GLUCOSE BLDC GLUCOMTR-MCNC: 164 MG/DL — HIGH (ref 70–99)
GLUCOSE BLDC GLUCOMTR-MCNC: 184 MG/DL — HIGH (ref 70–99)
GLUCOSE SERPL-MCNC: 162 MG/DL — HIGH (ref 70–99)
HCT VFR BLD CALC: 31.1 % — LOW (ref 39–50)
HGB BLD-MCNC: 10.9 G/DL — LOW (ref 13–17)
IMM GRANULOCYTES NFR BLD AUTO: 0 % — SIGNIFICANT CHANGE UP (ref 0–1.5)
LYMPHOCYTES # BLD AUTO: 1.27 K/UL — SIGNIFICANT CHANGE UP (ref 1–3.3)
LYMPHOCYTES # BLD AUTO: 33.2 % — SIGNIFICANT CHANGE UP (ref 13–44)
MAGNESIUM SERPL-MCNC: 2 MG/DL — SIGNIFICANT CHANGE UP (ref 1.6–2.6)
MCHC RBC-ENTMCNC: 34.4 PG — HIGH (ref 27–34)
MCHC RBC-ENTMCNC: 35 GM/DL — SIGNIFICANT CHANGE UP (ref 32–36)
MCV RBC AUTO: 98.1 FL — SIGNIFICANT CHANGE UP (ref 80–100)
MONOCYTES # BLD AUTO: 0.2 K/UL — SIGNIFICANT CHANGE UP (ref 0–0.9)
MONOCYTES NFR BLD AUTO: 5.2 % — SIGNIFICANT CHANGE UP (ref 2–14)
NEUTROPHILS # BLD AUTO: 2.25 K/UL — SIGNIFICANT CHANGE UP (ref 1.8–7.4)
NEUTROPHILS NFR BLD AUTO: 58.9 % — SIGNIFICANT CHANGE UP (ref 43–77)
NRBC # BLD: 0 /100 WBCS — SIGNIFICANT CHANGE UP (ref 0–0)
PHOSPHATE SERPL-MCNC: 1.6 MG/DL — LOW (ref 2.5–4.5)
PLATELET # BLD AUTO: 70 K/UL — LOW (ref 150–400)
POTASSIUM SERPL-MCNC: 3.2 MMOL/L — LOW (ref 3.5–5.3)
POTASSIUM SERPL-SCNC: 3.2 MMOL/L — LOW (ref 3.5–5.3)
PROT SERPL-MCNC: 6.6 G/DL — SIGNIFICANT CHANGE UP (ref 6–8.3)
RBC # BLD: 3.17 M/UL — LOW (ref 4.2–5.8)
RBC # FLD: 13 % — SIGNIFICANT CHANGE UP (ref 10.3–14.5)
SODIUM SERPL-SCNC: 136 MMOL/L — SIGNIFICANT CHANGE UP (ref 135–145)
WBC # BLD: 3.82 K/UL — SIGNIFICANT CHANGE UP (ref 3.8–10.5)
WBC # FLD AUTO: 3.82 K/UL — SIGNIFICANT CHANGE UP (ref 3.8–10.5)

## 2022-04-10 RX ORDER — INSULIN LISPRO 100/ML
VIAL (ML) SUBCUTANEOUS
Refills: 0 | Status: DISCONTINUED | OUTPATIENT
Start: 2022-04-10 | End: 2022-04-12

## 2022-04-10 RX ADMIN — ENOXAPARIN SODIUM 40 MILLIGRAM(S): 100 INJECTION SUBCUTANEOUS at 05:57

## 2022-04-10 RX ADMIN — Medication 1 TABLET(S): at 11:58

## 2022-04-10 RX ADMIN — Medication 1.5 MILLIGRAM(S): at 06:01

## 2022-04-10 RX ADMIN — Medication 1 MILLIGRAM(S): at 11:57

## 2022-04-10 RX ADMIN — PANTOPRAZOLE SODIUM 40 MILLIGRAM(S): 20 TABLET, DELAYED RELEASE ORAL at 11:57

## 2022-04-10 RX ADMIN — Medication 1.5 MILLIGRAM(S): at 14:01

## 2022-04-10 RX ADMIN — Medication 1: at 17:22

## 2022-04-10 RX ADMIN — Medication 105 MILLIGRAM(S): at 05:57

## 2022-04-10 RX ADMIN — Medication 105 MILLIGRAM(S): at 14:01

## 2022-04-10 RX ADMIN — Medication 1: at 11:58

## 2022-04-10 RX ADMIN — Medication 1.5 MILLIGRAM(S): at 18:08

## 2022-04-10 RX ADMIN — Medication 1: at 22:04

## 2022-04-10 RX ADMIN — Medication 1.5 MILLIGRAM(S): at 10:03

## 2022-04-10 RX ADMIN — Medication 1 MILLIGRAM(S): at 21:59

## 2022-04-10 RX ADMIN — Medication 105 MILLIGRAM(S): at 22:00

## 2022-04-10 NOTE — PROGRESS NOTE ADULT - SUBJECTIVE AND OBJECTIVE BOX
CHICA ROBERTSON  MR# [id number]  66yMale        Patient is a 66y old  Male who presents with a chief complaint of Alcohol withdrawal (09 Apr 2022 18:48)      INTERVAL HPI/OVERNIGHT EVENTS:  Patient seen and examined at bedside. No notations of chest pain, palpitation, SOB, orthopnea, nausea, vomiting or abdominal pain.    ALLERGIES  No Known Allergies      MEDICATIONS  enoxaparin Injectable 40 milliGRAM(s) SubCutaneous every 24 hours  folic acid 1 milliGRAM(s) Oral daily  insulin lispro (ADMELOG) corrective regimen sliding scale   SubCutaneous Before meals and at bedtime  LORazepam   Injectable 2 milliGRAM(s) IV Push every 2 hours PRN CIWA-Ar score 8 or greater  LORazepam   Injectable   IV Push   LORazepam   Injectable 1.5 milliGRAM(s) IV Push every 4 hours  LORazepam   Injectable 1 milliGRAM(s) IV Push every 4 hours  multivitamin/minerals 1 Tablet(s) Oral daily  ondansetron Injectable 4 milliGRAM(s) IV Push every 6 hours PRN Nausea and/or Vomiting  pantoprazole  Injectable 40 milliGRAM(s) IV Push daily  sodium chloride 0.9%. 1000 milliLiter(s) IV Continuous <Continuous>  thiamine IVPB 500 milliGRAM(s) IV Intermittent every 8 hours              REVIEW OF SYSTEMS:  CONSTITUTIONAL: No fever, weight loss, or fatigue  EYES: No eye pain, visual disturbances, or discharge  ENT:  No difficulty hearing, tinnitus, vertigo; No sinus or throat pain  NECK: No pain or stiffness  RESPIRATORY: No cough, wheezing, chills or hemoptysis; No Shortness of Breath  CARDIOVASCULAR: No chest pain, palpitations, passing out, dizziness, or leg swelling  GASTROINTESTINAL: No abdominal or epigastric pain. No nausea, vomiting, or hematemesis; No diarrhea or constipation. No melena or hematochezia.  GENITOURINARY: No dysuria, frequency, hematuria, or incontinence  NEUROLOGICAL: No headaches, memory loss, loss of strength, numbness, or tremors  SKIN: No itching, burning, rashes, or lesions   LYMPH Nodes: No enlarged glands  ENDOCRINE: No heat or cold intolerance; No hair loss  MUSCULOSKELETAL: No joint pain or swelling; No muscle, back, or extremity pain  PSYCHIATRIC: No depression, anxiety, mood swings, or difficulty sleeping  HEME/LYMPH: No easy bruising, or bleeding gums  ALLERGY AND IMMUNOLOGIC: No hives or eczema	    [ ] All others negative	  [ ] Unable to obtain      T(C): 37 (04-10-22 @ 11:07), Max: 37 (04-10-22 @ 11:07)  T(F): 98.6 (04-10-22 @ 11:07), Max: 98.6 (04-10-22 @ 11:07)  HR: 86 (04-10-22 @ 11:07) (86 - 99)  BP: 122/66 (04-10-22 @ 11:07) (116/71 - 141/70)  RR: 18 (04-10-22 @ 11:07) (17 - 18)  SpO2: 97% (04-10-22 @ 11:07) (93% - 97%)  Wt(kg): --    I&O's Summary    09 Apr 2022 07:01  -  10 Apr 2022 07:00  --------------------------------------------------------  IN: 1452 mL / OUT: 0 mL / NET: 1452 mL          PHYSICAL EXAM:  A X O x  HEAD:  Atraumatic, Normocephalic  EYES: EOMI, PERRLA, conjunctiva and sclera clear  NECK: Supple, No JVD, Normal thyroid  Resp: CTAB, No crackles, wheezing,   CVS: Regular rate and rhythm; No discernable murmurs, rubs, or gallops  ABD: Soft, Nontender, Nondistended; Bowel sounds present  EXTREMITIES:  2+ Peripheral Pulses, No edema  LYMPH: No dicernable lymphadenopathy noted  GENERAL: NAD, well-groomed, well-developed      LABS:                        10.9   3.82  )-----------( 70       ( 10 Apr 2022 07:18 )             31.1     04-10    136  |  101  |  16  ----------------------------<  162<H>  3.2<L>   |  25  |  0.54    Ca    8.2<L>      10 Apr 2022 07:18  Phos  1.6     04-10  Mg     2.0     04-10    TPro  6.6  /  Alb  2.8<L>  /  TBili  1.3<H>  /  DBili  x   /  AST  49<H>  /  ALT  38  /  AlkPhos  66  04-10        CAPILLARY BLOOD GLUCOSE      POCT Blood Glucose.: 184 mg/dL (10 Apr 2022 11:38)  POCT Blood Glucose.: 132 mg/dL (10 Apr 2022 05:53)  POCT Blood Glucose.: 139 mg/dL (09 Apr 2022 23:51)  POCT Blood Glucose.: 116 mg/dL (09 Apr 2022 21:21)  POCT Blood Glucose.: 191 mg/dL (09 Apr 2022 17:00)      Troponins:  ProBNP:  Lipid Profile:   HgA1c:  TSH:           RADIOLOGY & ADDITIONAL TESTS:    Imaging Personally Reviewed:  [ ] YES  [ ] NO      Consultant(s) Notes Reviewed:  [x ] YES  [ ] NO    Care Discussed with Consultants/Other Providers [ x] YES  [ ] NO          PAST MEDICAL & SURGICAL HISTORY:  EtOH dependence    Diabetes    HTN (hypertension)    No significant past surgical history          Vomiting    FH: diabetes mellitus (Sibling)    Handoff    MEWS Score    EtOH dependence    Diabetes    HTN (hypertension)    HTN (hypertension)    Intractable vomiting    Intractable vomiting    High anion gap metabolic acidosis    Diabetes mellitus    Prophylactic measure    Alcohol withdrawal    No significant past surgical history    No significant past surgical history    A) VOMITING    22    Starvation ketoacidosis    Alcohol withdrawal    [email or web address]

## 2022-04-11 LAB
ALBUMIN SERPL ELPH-MCNC: 3.2 G/DL — LOW (ref 3.5–5)
ALP SERPL-CCNC: 75 U/L — SIGNIFICANT CHANGE UP (ref 40–120)
ALT FLD-CCNC: 53 U/L DA — SIGNIFICANT CHANGE UP (ref 10–60)
ANION GAP SERPL CALC-SCNC: 11 MMOL/L — SIGNIFICANT CHANGE UP (ref 5–17)
AST SERPL-CCNC: 71 U/L — HIGH (ref 10–40)
BASOPHILS # BLD AUTO: 0.01 K/UL — SIGNIFICANT CHANGE UP (ref 0–0.2)
BASOPHILS NFR BLD AUTO: 0.2 % — SIGNIFICANT CHANGE UP (ref 0–2)
BILIRUB SERPL-MCNC: 1.1 MG/DL — SIGNIFICANT CHANGE UP (ref 0.2–1.2)
BUN SERPL-MCNC: 10 MG/DL — SIGNIFICANT CHANGE UP (ref 7–18)
CALCIUM SERPL-MCNC: 8.8 MG/DL — SIGNIFICANT CHANGE UP (ref 8.4–10.5)
CHLORIDE SERPL-SCNC: 99 MMOL/L — SIGNIFICANT CHANGE UP (ref 96–108)
CO2 SERPL-SCNC: 26 MMOL/L — SIGNIFICANT CHANGE UP (ref 22–31)
CREAT SERPL-MCNC: 0.52 MG/DL — SIGNIFICANT CHANGE UP (ref 0.5–1.3)
EGFR: 110 ML/MIN/1.73M2 — SIGNIFICANT CHANGE UP
EOSINOPHIL # BLD AUTO: 0.14 K/UL — SIGNIFICANT CHANGE UP (ref 0–0.5)
EOSINOPHIL NFR BLD AUTO: 3.4 % — SIGNIFICANT CHANGE UP (ref 0–6)
GLUCOSE BLDC GLUCOMTR-MCNC: 125 MG/DL — HIGH (ref 70–99)
GLUCOSE BLDC GLUCOMTR-MCNC: 171 MG/DL — HIGH (ref 70–99)
GLUCOSE BLDC GLUCOMTR-MCNC: 176 MG/DL — HIGH (ref 70–99)
GLUCOSE BLDC GLUCOMTR-MCNC: 197 MG/DL — HIGH (ref 70–99)
GLUCOSE BLDC GLUCOMTR-MCNC: 203 MG/DL — HIGH (ref 70–99)
GLUCOSE SERPL-MCNC: 157 MG/DL — HIGH (ref 70–99)
HCT VFR BLD CALC: 31.5 % — LOW (ref 39–50)
HGB BLD-MCNC: 11.1 G/DL — LOW (ref 13–17)
IMM GRANULOCYTES NFR BLD AUTO: 0.2 % — SIGNIFICANT CHANGE UP (ref 0–1.5)
LYMPHOCYTES # BLD AUTO: 1.74 K/UL — SIGNIFICANT CHANGE UP (ref 1–3.3)
LYMPHOCYTES # BLD AUTO: 42.4 % — SIGNIFICANT CHANGE UP (ref 13–44)
MAGNESIUM SERPL-MCNC: 1.9 MG/DL — SIGNIFICANT CHANGE UP (ref 1.6–2.6)
MCHC RBC-ENTMCNC: 33.8 PG — SIGNIFICANT CHANGE UP (ref 27–34)
MCHC RBC-ENTMCNC: 35.2 GM/DL — SIGNIFICANT CHANGE UP (ref 32–36)
MCV RBC AUTO: 96 FL — SIGNIFICANT CHANGE UP (ref 80–100)
MONOCYTES # BLD AUTO: 0.29 K/UL — SIGNIFICANT CHANGE UP (ref 0–0.9)
MONOCYTES NFR BLD AUTO: 7.1 % — SIGNIFICANT CHANGE UP (ref 2–14)
NEUTROPHILS # BLD AUTO: 1.91 K/UL — SIGNIFICANT CHANGE UP (ref 1.8–7.4)
NEUTROPHILS NFR BLD AUTO: 46.7 % — SIGNIFICANT CHANGE UP (ref 43–77)
NRBC # BLD: 0 /100 WBCS — SIGNIFICANT CHANGE UP (ref 0–0)
PHOSPHATE SERPL-MCNC: 2.2 MG/DL — LOW (ref 2.5–4.5)
PLATELET # BLD AUTO: 75 K/UL — LOW (ref 150–400)
POTASSIUM SERPL-MCNC: 3.3 MMOL/L — LOW (ref 3.5–5.3)
POTASSIUM SERPL-SCNC: 3.3 MMOL/L — LOW (ref 3.5–5.3)
PROT SERPL-MCNC: 6.9 G/DL — SIGNIFICANT CHANGE UP (ref 6–8.3)
RBC # BLD: 3.28 M/UL — LOW (ref 4.2–5.8)
RBC # FLD: 12 % — SIGNIFICANT CHANGE UP (ref 10.3–14.5)
SODIUM SERPL-SCNC: 136 MMOL/L — SIGNIFICANT CHANGE UP (ref 135–145)
WBC # BLD: 4.1 K/UL — SIGNIFICANT CHANGE UP (ref 3.8–10.5)
WBC # FLD AUTO: 4.1 K/UL — SIGNIFICANT CHANGE UP (ref 3.8–10.5)

## 2022-04-11 RX ORDER — POTASSIUM CHLORIDE 20 MEQ
40 PACKET (EA) ORAL EVERY 4 HOURS
Refills: 0 | Status: COMPLETED | OUTPATIENT
Start: 2022-04-11 | End: 2022-04-11

## 2022-04-11 RX ORDER — SODIUM,POTASSIUM PHOSPHATES 278-250MG
1 POWDER IN PACKET (EA) ORAL ONCE
Refills: 0 | Status: COMPLETED | OUTPATIENT
Start: 2022-04-11 | End: 2022-04-11

## 2022-04-11 RX ADMIN — Medication 105 MILLIGRAM(S): at 06:16

## 2022-04-11 RX ADMIN — Medication 40 MILLIEQUIVALENT(S): at 18:35

## 2022-04-11 RX ADMIN — Medication 1 MILLIGRAM(S): at 06:17

## 2022-04-11 RX ADMIN — Medication 105 MILLIGRAM(S): at 23:46

## 2022-04-11 RX ADMIN — Medication 1: at 08:25

## 2022-04-11 RX ADMIN — Medication 1 MILLIGRAM(S): at 13:06

## 2022-04-11 RX ADMIN — Medication 1 MILLIGRAM(S): at 01:58

## 2022-04-11 RX ADMIN — ENOXAPARIN SODIUM 40 MILLIGRAM(S): 100 INJECTION SUBCUTANEOUS at 06:17

## 2022-04-11 RX ADMIN — Medication 1: at 16:30

## 2022-04-11 RX ADMIN — Medication 1 PACKET(S): at 18:35

## 2022-04-11 RX ADMIN — Medication 105 MILLIGRAM(S): at 18:36

## 2022-04-11 RX ADMIN — PANTOPRAZOLE SODIUM 40 MILLIGRAM(S): 20 TABLET, DELAYED RELEASE ORAL at 13:07

## 2022-04-11 RX ADMIN — Medication 2: at 22:39

## 2022-04-11 RX ADMIN — Medication 1 TABLET(S): at 13:06

## 2022-04-11 RX ADMIN — Medication 25 MILLIGRAM(S): at 22:22

## 2022-04-11 RX ADMIN — Medication 25 MILLIGRAM(S): at 13:06

## 2022-04-11 NOTE — PROGRESS NOTE ADULT - PROBLEM SELECTOR PLAN 3
Patient has hx of alcohol use disorder. States that he drinks one bottle of tequila and couple of bottles of beer every other night.   In ED, patient tachycardic, visible tremor on arm extension, and tachycardia.    c/w ativan taper per CIWA order  c/w folate, thiamine, and MVI.   SW consult for Alcohol use disorder  Advance diet as tolerated Likely due to Alcohol Ketoacidosis or starvation ketoacidosis  patient received 1L NS Bolus in ED.   Lactate 3.8>> 1.2   AG 25 on adm and Moderate Acetone in serum  AG now closed  Monitor BMP

## 2022-04-11 NOTE — PROGRESS NOTE ADULT - PROBLEM SELECTOR PLAN 2
Likely due to Alcohol Ketoacidosis or starvation ketoacidosis  patient received 1L NS Bolus in ED.   Lactate 3.8>> 1.2   AG 25 on adm and Moderate Acetone in serum  AG now closed  Monitor BMP Patient has hx of alcohol use disorder. States that he drinks one bottle of tequila and couple of bottles of beer every other night.   In ED, patient tachycardic, visible tremor on arm extension, and tachycardia.    dc ativan taper  c/w librium  c/w folate, thiamine, and MVI.   SW consult for Alcohol use disorder  Advance diet as tolerated

## 2022-04-11 NOTE — PROGRESS NOTE ADULT - SUBJECTIVE AND OBJECTIVE BOX
PGY-1 Progress Note discussed with attending    PAGER #: [[phone]] TILL 5:00 PM  PLEASE CONTACT ON CALL TEAM:  - On Call Team (Please refer to Karen) FROM 5:00 PM - 8:30PM  - Nightfloat Team FROM 8:30 -7:30 AM    CHIEF COMPLAINT & BRIEF HOSPITAL COURSE:  Pt is a 67 yo M with PMH of Alcohol use disorder, HTN, DM coming to the ED for vomiting associated with epigastric pain. Patient states that he started having multiple episodes of non bloody vomiting for the last three days. He has been unable to eat or drink anything without vomiting it out. He is also complaining of epigastric pain that is provoked by vomiting. He states that he drinks 1 bottle of tequila or couple of beers. Last drink was yesterday morning. He gets admitted to [facility] almost every month for the same reason.     INTERVAL HPI/OVERNIGHT EVENTS:   Patient seen and examined at bedside. No overnight events. No acute complaints this AM. DC planning    REVIEW OF SYSTEMS:  CONSTITUTIONAL: No fever, night sweats, weight loss, or fatigue  RESPIRATORY: No cough, wheezing, or hemoptysis; No shortness of breath  CARDIOVASCULAR: No chest pain, palpitations, dizziness, or leg swelling  GASTROINTESTINAL: No abdominal pain. No nausea, vomiting, or hematemesis; No diarrhea or constipation. No melena or hematochezia.  GENITOURINARY: No dysuria or hematuria, no urinary frequency  NEUROLOGICAL: No headaches, memory loss, loss of strength, numbness, or tremors  SKIN: No itching, burning, rashes, or lesions     Vital Signs Last 24 Hrs  T(C): 36.2 (11 Apr 2022 07:58), Max: 37 (10 Apr 2022 11:07)  T(F): 97.1 (11 Apr 2022 07:58), Max: 98.6 (10 Apr 2022 11:07)  HR: 89 (11 Apr 2022 07:58) (84 - 98)  BP: 121/61 (11 Apr 2022 07:58) (111/67 - 138/73)  BP(mean): --  RR: 18 (11 Apr 2022 07:58) (18 - 18)  SpO2: 96% (11 Apr 2022 07:58) (95% - 99%)    PHYSICAL EXAMINATION:  GENERAL: NAD, well built  HEAD:  Atraumatic, Normocephalic  EYES:  conjunctiva and sclera clear  NECK: Supple, No JVD, thyroid not examined   CHEST/LUNG: Clear to auscultation. No wheezing, rales, rubs or rhonchi  HEART: Regular rate and rhythm; Clear S1 and S2, No murmurs, rubs, or gallops  ABDOMEN: Soft, Nontender, Nondistended; Bowel sounds present  NERVOUS SYSTEM:  Alert & Oriented X3, CNII-XII intact, no focal neurological deficits   EXTREMITIES:  2+ Peripheral Pulses, No clubbing, cyanosis, or edema  SKIN: warm dry, no lesions noted                           11.1   4.10  )-----------( 75       ( 11 Apr 2022 06:42 )             31.5     04-11    136  |  99  |  10  ----------------------------<  157<H>  3.3<L>   |  26  |  0.52    Ca    8.8      11 Apr 2022 06:42  Phos  2.2     04-11  Mg     1.9     04-11    TPro  6.9  /  Alb  3.2<L>  /  TBili  1.1  /  DBili  x   /  AST  71<H>  /  ALT  53  /  AlkPhos  75  04-11    LIVER FUNCTIONS - ( 11 Apr 2022 06:42 )  Alb: 3.2 g/dL / Pro: 6.9 g/dL / ALK PHOS: 75 U/L / ALT: 53 U/L DA / AST: 71 U/L / GGT: x             CAPILLARY BLOOD GLUCOSE    MEDICATIONS  (STANDING):  enoxaparin Injectable 40 milliGRAM(s) SubCutaneous every 24 hours  folic acid 1 milliGRAM(s) Oral daily  insulin lispro (ADMELOG) corrective regimen sliding scale   SubCutaneous Before meals and at bedtime  LORazepam   Injectable   IV Push   LORazepam   Injectable 1 milliGRAM(s) IV Push every 4 hours  LORazepam   Injectable 0.5 milliGRAM(s) IV Push every 4 hours  multivitamin/minerals 1 Tablet(s) Oral daily  pantoprazole  Injectable 40 milliGRAM(s) IV Push daily  potassium chloride    Tablet ER 40 milliEquivalent(s) Oral every 4 hours  potassium phosphate / sodium phosphate Powder (PHOS-NaK) 1 Packet(s) Oral once  sodium chloride 0.9%. 1000 milliLiter(s) (100 mL/Hr) IV Continuous <Continuous>  thiamine IVPB 500 milliGRAM(s) IV Intermittent every 8 hours    MEDICATIONS  (PRN):  LORazepam   Injectable 2 milliGRAM(s) IV Push every 2 hours PRN CIWA-Ar score 8 or greater  ondansetron Injectable 4 milliGRAM(s) IV Push every 6 hours PRN Nausea and/or Vomiting      RADIOLOGY & ADDITIONAL TESTS:      ACC: [id number] EXAM:  CT ABDOMEN AND PELVIS IC                          PROCEDURE DATE:  04/08/2022          INTERPRETATION:  CLINICAL INFORMATION: Epigastric abdominal pain    COMPARISON: 3/13/2022    CONTRAST/COMPLICATIONS:  IV Contrast: [id number]  48 cc administered   52 cc discarded  Oral Contrast: NONE  Complications: None reported at time of study completion    PROCEDURE:  CT of the Abdomen and Pelvis was performed.  Sagittal and coronal reformats were performed.    FINDINGS: The examination is limited by respiratory motion artifact.  LOWER CHEST: Small bilateral atelectasis. Small calcified granuloma in   the left lower lobe.    LIVER: Hepatic steatosis again noted. 2.2 cm rim calcified lesion is   again seen in the right hepatic lobe, unchanged since the previous   abdominal CT dated 7/18/2019.  BILE DUCTS: Normal caliber.  GALLBLADDER: No CT evidence for gallstones. Evaluation of the gallbladder   wall is limited by respiratory motion artifact.  SPLEEN: Within normal limits.  PANCREAS: Within normal limits.  ADRENALS: Within normal limits.  KIDNEYS/URETERS: Within normal limits.    BLADDER: Within normal limits.  REPRODUCTIVE ORGANS: The prostate is mildly enlarged.    BOWEL: No bowel obstruction. Appendix within normal limits. Colonic   diverticulosis without evidence of diverticulitis. Questionable distal   esophageal mural thickening is again noted. If clinically indicated, EGD   may be pursued for further evaluation.  PERITONEUM: No free air or ascites.  VESSELS:Calcified atherosclerotic disease.  RETROPERITONEUM/LYMPH NODES: No lymphadenopathy.  ABDOMINAL WALL: Small fat-containing inguinal hernias bilaterally.  BONES: Mild degenerative spondylosis.    IMPRESSION:  No bowel obstruction. Appendix within normal limits. Colonic   diverticulosis without evidence of diverticulitis. Questionable distal   esophageal mural thickening is again noted. If clinically indicated, EGD   may be pursued for further evaluation.        --- End of Report ---      AVERY COOPER MD; Attending Radiologist  This document has been electronically signed. Apr 8 2022  8:25PM   PGY-1 Progress Note discussed with attending    PAGER #: [[phone]] TILL 5:00 PM  PLEASE CONTACT ON CALL TEAM:  - On Call Team (Please refer to Karen) FROM 5:00 PM - 8:30PM  - Nightfloat Team FROM 8:30 -7:30 AM    CHIEF COMPLAINT & BRIEF HOSPITAL COURSE:  Pt is a 67 yo M with PMH of Alcohol use disorder, HTN, DM coming to the ED for vomiting associated with epigastric pain. Patient states that he started having multiple episodes of non bloody vomiting for the last three days. He has been unable to eat or drink anything without vomiting it out. He is also complaining of epigastric pain that is provoked by vomiting. He states that he drinks 1 bottle of tequila or couple of beers. Last drink was yesterday morning. He gets admitted to [facility] almost every month for the same reason.     INTERVAL HPI/OVERNIGHT EVENTS:   Patient seen and examined at bedside. No overnight events. No acute complaints this AM. Pt not requiring PRN ativan.    REVIEW OF SYSTEMS:  CONSTITUTIONAL: No fever, night sweats, weight loss, or fatigue  RESPIRATORY: No cough, wheezing, or hemoptysis; No shortness of breath  CARDIOVASCULAR: No chest pain, palpitations, dizziness, or leg swelling  GASTROINTESTINAL: No abdominal pain. No nausea, vomiting, or hematemesis; No diarrhea or constipation. No melena or hematochezia.  GENITOURINARY: No dysuria or hematuria, no urinary frequency  NEUROLOGICAL: No headaches, memory loss, loss of strength, numbness, or tremors  SKIN: No itching, burning, rashes, or lesions     Vital Signs Last 24 Hrs  T(C): 36.2 (11 Apr 2022 07:58), Max: 37 (10 Apr 2022 11:07)  T(F): 97.1 (11 Apr 2022 07:58), Max: 98.6 (10 Apr 2022 11:07)  HR: 89 (11 Apr 2022 07:58) (84 - 98)  BP: 121/61 (11 Apr 2022 07:58) (111/67 - 138/73)  BP(mean): --  RR: 18 (11 Apr 2022 07:58) (18 - 18)  SpO2: 96% (11 Apr 2022 07:58) (95% - 99%)    PHYSICAL EXAMINATION:  GENERAL: NAD, well built  HEAD:  Atraumatic, Normocephalic  EYES:  conjunctiva and sclera clear  NECK: Supple, No JVD, thyroid not examined   CHEST/LUNG: Clear to auscultation. No wheezing, rales, rubs or rhonchi  HEART: Regular rate and rhythm; Clear S1 and S2, No murmurs, rubs, or gallops  ABDOMEN: Soft, Nontender, Nondistended; Bowel sounds present  NERVOUS SYSTEM:  Alert & Oriented X3, CNII-XII intact, no focal neurological deficits   EXTREMITIES:  2+ Peripheral Pulses, No clubbing, cyanosis, or edema  SKIN: warm dry, no lesions noted                           11.1   4.10  )-----------( 75       ( 11 Apr 2022 06:42 )             31.5     04-11    136  |  99  |  10  ----------------------------<  157<H>  3.3<L>   |  26  |  0.52    Ca    8.8      11 Apr 2022 06:42  Phos  2.2     04-11  Mg     1.9     04-11    TPro  6.9  /  Alb  3.2<L>  /  TBili  1.1  /  DBili  x   /  AST  71<H>  /  ALT  53  /  AlkPhos  75  04-11    LIVER FUNCTIONS - ( 11 Apr 2022 06:42 )  Alb: 3.2 g/dL / Pro: 6.9 g/dL / ALK PHOS: 75 U/L / ALT: 53 U/L DA / AST: 71 U/L / GGT: x             CAPILLARY BLOOD GLUCOSE    MEDICATIONS  (STANDING):  enoxaparin Injectable 40 milliGRAM(s) SubCutaneous every 24 hours  folic acid 1 milliGRAM(s) Oral daily  insulin lispro (ADMELOG) corrective regimen sliding scale   SubCutaneous Before meals and at bedtime  LORazepam   Injectable   IV Push   LORazepam   Injectable 1 milliGRAM(s) IV Push every 4 hours  LORazepam   Injectable 0.5 milliGRAM(s) IV Push every 4 hours  multivitamin/minerals 1 Tablet(s) Oral daily  pantoprazole  Injectable 40 milliGRAM(s) IV Push daily  potassium chloride    Tablet ER 40 milliEquivalent(s) Oral every 4 hours  potassium phosphate / sodium phosphate Powder (PHOS-NaK) 1 Packet(s) Oral once  sodium chloride 0.9%. 1000 milliLiter(s) (100 mL/Hr) IV Continuous <Continuous>  thiamine IVPB 500 milliGRAM(s) IV Intermittent every 8 hours    MEDICATIONS  (PRN):  LORazepam   Injectable 2 milliGRAM(s) IV Push every 2 hours PRN CIWA-Ar score 8 or greater  ondansetron Injectable 4 milliGRAM(s) IV Push every 6 hours PRN Nausea and/or Vomiting      RADIOLOGY & ADDITIONAL TESTS:      ACC: [id number] EXAM:  CT ABDOMEN AND PELVIS IC                          PROCEDURE DATE:  04/08/2022          INTERPRETATION:  CLINICAL INFORMATION: Epigastric abdominal pain    COMPARISON: 3/13/2022    CONTRAST/COMPLICATIONS:  IV Contrast: [id number]  48 cc administered   52 cc discarded  Oral Contrast: NONE  Complications: None reported at time of study completion    PROCEDURE:  CT of the Abdomen and Pelvis was performed.  Sagittal and coronal reformats were performed.    FINDINGS: The examination is limited by respiratory motion artifact.  LOWER CHEST: Small bilateral atelectasis. Small calcified granuloma in   the left lower lobe.    LIVER: Hepatic steatosis again noted. 2.2 cm rim calcified lesion is   again seen in the right hepatic lobe, unchanged since the previous   abdominal CT dated 7/18/2019.  BILE DUCTS: Normal caliber.  GALLBLADDER: No CT evidence for gallstones. Evaluation of the gallbladder   wall is limited by respiratory motion artifact.  SPLEEN: Within normal limits.  PANCREAS: Within normal limits.  ADRENALS: Within normal limits.  KIDNEYS/URETERS: Within normal limits.    BLADDER: Within normal limits.  REPRODUCTIVE ORGANS: The prostate is mildly enlarged.    BOWEL: No bowel obstruction. Appendix within normal limits. Colonic   diverticulosis without evidence of diverticulitis. Questionable distal   esophageal mural thickening is again noted. If clinically indicated, EGD   may be pursued for further evaluation.  PERITONEUM: No free air or ascites.  VESSELS:Calcified atherosclerotic disease.  RETROPERITONEUM/LYMPH NODES: No lymphadenopathy.  ABDOMINAL WALL: Small fat-containing inguinal hernias bilaterally.  BONES: Mild degenerative spondylosis.    IMPRESSION:  No bowel obstruction. Appendix within normal limits. Colonic   diverticulosis without evidence of diverticulitis. Questionable distal   esophageal mural thickening is again noted. If clinically indicated, EGD   may be pursued for further evaluation.        --- End of Report ---      AVERY COOPER MD; Attending Radiologist  This document has been electronically signed. Apr 8 2022  8:25PM

## 2022-04-12 VITALS
OXYGEN SATURATION: 98 % | DIASTOLIC BLOOD PRESSURE: 69 MMHG | RESPIRATION RATE: 18 BRPM | HEART RATE: 85 BPM | SYSTOLIC BLOOD PRESSURE: 123 MMHG | TEMPERATURE: 98 F

## 2022-04-12 LAB
ANION GAP SERPL CALC-SCNC: 9 MMOL/L — SIGNIFICANT CHANGE UP (ref 5–17)
BUN SERPL-MCNC: 11 MG/DL — SIGNIFICANT CHANGE UP (ref 7–18)
CALCIUM SERPL-MCNC: 9.5 MG/DL — SIGNIFICANT CHANGE UP (ref 8.4–10.5)
CHLORIDE SERPL-SCNC: 103 MMOL/L — SIGNIFICANT CHANGE UP (ref 96–108)
CO2 SERPL-SCNC: 25 MMOL/L — SIGNIFICANT CHANGE UP (ref 22–31)
CREAT SERPL-MCNC: 0.55 MG/DL — SIGNIFICANT CHANGE UP (ref 0.5–1.3)
EGFR: 108 ML/MIN/1.73M2 — SIGNIFICANT CHANGE UP
GLUCOSE BLDC GLUCOMTR-MCNC: 138 MG/DL — HIGH (ref 70–99)
GLUCOSE BLDC GLUCOMTR-MCNC: 152 MG/DL — HIGH (ref 70–99)
GLUCOSE SERPL-MCNC: 156 MG/DL — HIGH (ref 70–99)
HCT VFR BLD CALC: 34.5 % — LOW (ref 39–50)
HGB BLD-MCNC: 12.2 G/DL — LOW (ref 13–17)
MAGNESIUM SERPL-MCNC: 1.9 MG/DL — SIGNIFICANT CHANGE UP (ref 1.6–2.6)
MCHC RBC-ENTMCNC: 34.1 PG — HIGH (ref 27–34)
MCHC RBC-ENTMCNC: 35.4 GM/DL — SIGNIFICANT CHANGE UP (ref 32–36)
MCV RBC AUTO: 96.4 FL — SIGNIFICANT CHANGE UP (ref 80–100)
NRBC # BLD: 0 /100 WBCS — SIGNIFICANT CHANGE UP (ref 0–0)
PHOSPHATE SERPL-MCNC: 2.9 MG/DL — SIGNIFICANT CHANGE UP (ref 2.5–4.5)
PLATELET # BLD AUTO: 99 K/UL — LOW (ref 150–400)
POTASSIUM SERPL-MCNC: 3.6 MMOL/L — SIGNIFICANT CHANGE UP (ref 3.5–5.3)
POTASSIUM SERPL-SCNC: 3.6 MMOL/L — SIGNIFICANT CHANGE UP (ref 3.5–5.3)
RBC # BLD: 3.58 M/UL — LOW (ref 4.2–5.8)
RBC # FLD: 12.1 % — SIGNIFICANT CHANGE UP (ref 10.3–14.5)
SODIUM SERPL-SCNC: 137 MMOL/L — SIGNIFICANT CHANGE UP (ref 135–145)
WBC # BLD: 4.06 K/UL — SIGNIFICANT CHANGE UP (ref 3.8–10.5)
WBC # FLD AUTO: 4.06 K/UL — SIGNIFICANT CHANGE UP (ref 3.8–10.5)

## 2022-04-12 PROCEDURE — 86703 HIV-1/HIV-2 1 RESULT ANTBDY: CPT

## 2022-04-12 PROCEDURE — 36415 COLL VENOUS BLD VENIPUNCTURE: CPT

## 2022-04-12 PROCEDURE — 99285 EMERGENCY DEPT VISIT HI MDM: CPT | Mod: 25

## 2022-04-12 PROCEDURE — 80307 DRUG TEST PRSMV CHEM ANLYZR: CPT

## 2022-04-12 PROCEDURE — 83036 HEMOGLOBIN GLYCOSYLATED A1C: CPT

## 2022-04-12 PROCEDURE — 74177 CT ABD & PELVIS W/CONTRAST: CPT | Mod: MA

## 2022-04-12 PROCEDURE — 85027 COMPLETE CBC AUTOMATED: CPT

## 2022-04-12 PROCEDURE — 80061 LIPID PANEL: CPT

## 2022-04-12 PROCEDURE — 84443 ASSAY THYROID STIM HORMONE: CPT

## 2022-04-12 PROCEDURE — 84100 ASSAY OF PHOSPHORUS: CPT

## 2022-04-12 PROCEDURE — 83690 ASSAY OF LIPASE: CPT

## 2022-04-12 PROCEDURE — 96375 TX/PRO/DX INJ NEW DRUG ADDON: CPT

## 2022-04-12 PROCEDURE — 71045 X-RAY EXAM CHEST 1 VIEW: CPT

## 2022-04-12 PROCEDURE — 83735 ASSAY OF MAGNESIUM: CPT

## 2022-04-12 PROCEDURE — 82962 GLUCOSE BLOOD TEST: CPT

## 2022-04-12 PROCEDURE — 82009 KETONE BODYS QUAL: CPT

## 2022-04-12 PROCEDURE — 83605 ASSAY OF LACTIC ACID: CPT

## 2022-04-12 PROCEDURE — 96374 THER/PROPH/DIAG INJ IV PUSH: CPT

## 2022-04-12 PROCEDURE — 82803 BLOOD GASES ANY COMBINATION: CPT

## 2022-04-12 PROCEDURE — 87635 SARS-COV-2 COVID-19 AMP PRB: CPT

## 2022-04-12 PROCEDURE — 80053 COMPREHEN METABOLIC PANEL: CPT

## 2022-04-12 PROCEDURE — 80048 BASIC METABOLIC PNL TOTAL CA: CPT

## 2022-04-12 PROCEDURE — 93005 ELECTROCARDIOGRAM TRACING: CPT

## 2022-04-12 PROCEDURE — 96372 THER/PROPH/DIAG INJ SC/IM: CPT

## 2022-04-12 PROCEDURE — 85025 COMPLETE CBC W/AUTO DIFF WBC: CPT

## 2022-04-12 PROCEDURE — 80076 HEPATIC FUNCTION PANEL: CPT

## 2022-04-12 RX ADMIN — Medication 1 MILLIGRAM(S): at 14:40

## 2022-04-12 RX ADMIN — PANTOPRAZOLE SODIUM 40 MILLIGRAM(S): 20 TABLET, DELAYED RELEASE ORAL at 14:41

## 2022-04-12 RX ADMIN — ENOXAPARIN SODIUM 40 MILLIGRAM(S): 100 INJECTION SUBCUTANEOUS at 06:01

## 2022-04-12 RX ADMIN — Medication 1 TABLET(S): at 14:43

## 2022-04-12 RX ADMIN — Medication 105 MILLIGRAM(S): at 06:01

## 2022-04-12 RX ADMIN — Medication 25 MILLIGRAM(S): at 14:40

## 2022-04-12 RX ADMIN — Medication 25 MILLIGRAM(S): at 06:01

## 2022-04-12 NOTE — DISCHARGE NOTE PROVIDER - HOSPITAL COURSE
Pt is a 67 yo M with PMH of Alcohol use disorder, HTN, DM p/w vomiting associated with epigastric pain of three days duration. He drinks 1 bottle of tequila or couple of beers daily, last drink on day prior to admission. He gets admitted to [facility] almost every month for the same reason. Patient tachycardic and tremulous on exam. Admitted for alcohol withdrawal and AGMA, started on CIWA protocol with ativan taper, folic acid, thiamine and multivitamin. CT abdomen showed esophogeal wall thickening (similar to prior CT). GI Meghanayan consulted who advised outpatient EGD. AGMA closed, patient tolerated PO diet. Stated on SSI for DM. Education provided. Patient medically stable for discharge with outpatient GI follow up.

## 2022-04-12 NOTE — DISCHARGE NOTE PROVIDER - NSDCCPCAREPLAN_GEN_ALL_CORE_FT
PRINCIPAL DISCHARGE DIAGNOSIS  Diagnosis: Alcohol withdrawal  Assessment and Plan of Treatment:       SECONDARY DISCHARGE DIAGNOSES  Diagnosis: High anion gap metabolic acidosis  Assessment and Plan of Treatment: You were found to have a high aniong gap metabolic acidosis from alcohol in addition to your intractable vomiting. You were given fluids and treated for alcohol withdrawal and your aniong gap acidosis resolved. It is important to refrain from alcohol abuse as discussed above    Diagnosis: Diabetes mellitus  Assessment and Plan of Treatment: You have previously been diagnosed with diabetes mellitus for which you take metformin and prandin at home. We managed you on sliding scale insulin while you were hospitalized with good glycemic control. Continue with your blood sugar medication. Please check your blood sugar levels twice daily - Morning/Afternoon, and Lunch/Bedtime the following day. It is important to keep a record of your blood sugar readings. We recommend you maintain a healthy diet that is low in sugar, carbohydrates and fat. Please limit your intake of high sugar and high carbohydrate foods such as pasta, rice, and bread. Exercise frequently as tolerated. Please follow up with you primary care physician within one week of your discharge to further manage your diabetes and monitor your Hemoglobin A1c levels after 3 months to evaluate your diabetes control.     PRINCIPAL DISCHARGE DIAGNOSIS  Diagnosis: Alcohol withdrawal  Assessment and Plan of Treatment: You came to the hospital for intractable vomiting from alcohol use. You were admitted for alcohol withdrawal and started on a medication regimen to prevent severe complication including death. You were provided with education and resources regarding detox /rehab for alcohol abuse but declined active referral to treatment. You were provided with outpatient resources to follow up with. It is crucial to refrain from exccesive alcohol intake as it can caues severe debilitating disease including liver failure, many cancers, and death.      SECONDARY DISCHARGE DIAGNOSES  Diagnosis: High anion gap metabolic acidosis  Assessment and Plan of Treatment: You were found to have a high aniong gap metabolic acidosis from alcohol in addition to your intractable vomiting. You were given fluids and treated for alcohol withdrawal and your aniong gap acidosis resolved. It is important to refrain from alcohol abuse as discussed above    Diagnosis: Diabetes mellitus  Assessment and Plan of Treatment: You have previously been diagnosed with diabetes mellitus for which you take metformin and prandin at home. We managed you on sliding scale insulin while you were hospitalized with good glycemic control. Continue with your blood sugar medication. Please check your blood sugar levels twice daily - Morning/Afternoon, and Lunch/Bedtime the following day. It is important to keep a record of your blood sugar readings. We recommend you maintain a healthy diet that is low in sugar, carbohydrates and fat. Please limit your intake of high sugar and high carbohydrate foods such as pasta, rice, and bread. Exercise frequently as tolerated. Please follow up with you primary care physician within one week of your discharge to further manage your diabetes and monitor your Hemoglobin A1c levels after 3 months to evaluate your diabetes control.

## 2022-04-12 NOTE — DISCHARGE NOTE NURSING/CASE MANAGEMENT/SOCIAL WORK - PATIENT PORTAL LINK FT
2 You can access the FollowMyHealth Patient Portal offered by [facility] by registering at the following website: [email or web address]. By joining [employer]’s FollowMyHealth portal, you will also be able to view your health information using other applications (apps) compatible with our system.

## 2022-04-12 NOTE — DISCHARGE NOTE PROVIDER - NSDCMRMEDTOKEN_GEN_ALL_CORE_FT
cyanocobalamin 1000 mcg oral tablet: 1 tab(s) orally once a day  famotidine 20 mg oral tablet: 1 tab(s) orally once a day   folic acid 1 mg oral tablet: 1 tab(s) orally once a day  metFORMIN 1000 mg oral tablet: 1 tab(s) orally 2 times a day  Multiple Vitamins with Minerals oral tablet: 1 tab(s) orally once a day  repaglinide 1 mg oral tablet: 1 tab(s) orally 3 times a day (before meals)  thiamine 100 mg oral tablet: 1 tab(s) orally once a day

## 2022-04-12 NOTE — PROGRESS NOTE ADULT - PROBLEM SELECTOR PLAN 3
Likely due to Alcohol Ketoacidosis or starvation ketoacidosis  patient received 1L NS Bolus in ED.   Lactate 3.8>> 1.2   AG 25 on adm and Moderate Acetone in serum  AG now closed  Monitor BMP

## 2022-04-12 NOTE — PROGRESS NOTE ADULT - REASON FOR ADMISSION
Alcohol withdrawal
Jet Gannon D.O., PGY2 (Resident)  CXr reading possible LLL PNA. no wbc. afebrile. Opt not to treat. endorsed to admitting hospitalist.

## 2022-04-12 NOTE — PROGRESS NOTE ADULT - ASSESSMENT
Pt is a 67 yo M with PMH of Alcohol use disorder, HTN, DM coming to the ED for vomiting associated with epigastric pain. Admitted for alcohol withdrawal.
Pt is a 65 yo M with PMH of Alcohol use disorder, HTN, DM coming to the ED for vomiting associated with epigastric pain. Admitted for alcohol withdrawal.
Pt is a 67 yo M with PMH of Alcohol use disorder, HTN, DM coming to the ED for vomiting associated with epigastric pain. Admitted for alcohol withdrawal.
Pt is a 67 yo M with PMH of Alcohol use disorder, HTN, DM coming to the ED for vomiting associated with epigastric pain. Admitted for alcohol withdrawal.

## 2022-04-12 NOTE — PROGRESS NOTE ADULT - PROBLEM SELECTOR PLAN 1
Patient coming to ED c/o of vomiting with associated epigastric pain  Vitals Tachyardic  CT abdomen showing esophageal wall thickening similar to prior CT scan  In ED received Zofran, 1L NS.   Zofran for nausea   started on IVF 100cc/hr   on Protonix IV   Monitor BMP for electrolyte derangements due to vomiting  advance diet as tolerated  GI Dr. Sanabria consulted
Patient coming to ED c/o of vomiting with associated epigastric pain  Vitals Tachyardic  CT abdomen showing esophageal wall thickening similar to prior CT scan  In ED received Zofran, 1L NS.   Zofran for nausea   started on IVF 100cc/hr   on Protonix IV   Monitor BMP for electrolyte derangements due to vomiting  advance diet as tolerated  GI Dr. Sanabria consulted
Patient coming to ED c/o of vomiting with associated epigastric pain  Vitals Tachycardic  CT abdomen showing esophageal wall thickening similar to prior CT scan  In ED received Zofran, 1L NS.   Zofran for nausea   started on IVF 100cc/hr   on Protonix IV   Monitor BMP for electrolyte derangements due to vomiting  advance diet as tolerated  GI Dr. Sanabria consulted  -EGD outpatient
RESOLVED  -EGD outpatient

## 2022-04-12 NOTE — PROGRESS NOTE ADULT - PROBLEM SELECTOR PLAN 5
Lovenox for DVT ppx   Protonix for GI ppx

## 2022-04-12 NOTE — DISCHARGE NOTE NURSING/CASE MANAGEMENT/SOCIAL WORK - NSDCPEFALRISK_GEN_ALL_CORE
For information on Fall & Injury Prevention, visit: [email or web address] OR  [email or web address] OR  https://www.cdc.gov/steadi/patient.html

## 2022-04-12 NOTE — PROGRESS NOTE ADULT - PROBLEM SELECTOR PLAN 4
Pt has h/o DM  at home on metformin and prandin  A1c 6.1  c/w SSI

## 2022-04-12 NOTE — DISCHARGE NOTE PROVIDER - CARE PROVIDER_API CALL
Brandon Hendricks)  Medicine  [address] [place] [address]  Phone: [phone]  Fax: [phone]  Follow Up Time:

## 2022-04-12 NOTE — PROGRESS NOTE ADULT - ATTENDING COMMENTS
Will continue with scheduled tapering plan of IV ativan with assessment of DT symptomology. Pt now on minimal ativan dosing, scheduled to be d/zenon. Pt has declined recommended option of inpatient or outpatient etoh rehabs at present.    4/12/22- benzo taper completed as per schedule; off all benzos x 24hrs now w/o any DT symptomatology.  D/C planning for home today.
Will continue with scheduled tapering plan of IV ativan with assessment of DT symptomology.  D/C planning for home likely in a.m.
Will continue with scheduled tapering plan of IV ativan with assessment of DT symptomology.
Will continue with scheduled tapering plan of IV ativan with assessment of DT symptomology. Pt now on minimal ativan dosing, scheduled to be d/zenon. Pt has declined recommended option of inpatient or outpatient etoh rehabs at present.  D/C planning for home likely in a.m.

## 2022-05-14 NOTE — ED PROVIDER NOTE - NS_EDPROVIDERDISPOUSERTYPE_ED_A_ED
"Evelin"Fiordaliza Taylor was seen and treated in our emergency department on 5/14/2022.     COVID-19 is present in our communities across the state. There is limited testing for COVID at this time, so not all patients can be tested. In this situation, your employee meets the following criteria:    Evelin Taylor has met the criteria for COVID-19 testing and has a POSITIVE result. She can return to work once they are asymptomatic for 24 hours without the use of fever reducing medications AND at least five days from the first positive result. A mask is recommended for 5 days post quarantine.     If you have any questions or concerns, or if I can be of further assistance, please do not hesitate to contact me.    Sincerely,             Chino Hough MD" Scribe Attestation (For Scribes USE Only)... Attending Attestation (For Attendings USE Only).../Scribe Attestation (For Scribes USE Only)...

## 2022-06-08 ENCOUNTER — EMERGENCY (EMERGENCY)
Facility: HOSPITAL | Age: 67
LOS: 1 days | Discharge: HOME | End: 2022-06-08
Attending: EMERGENCY MEDICINE
Payer: MEDICAID

## 2022-06-08 VITALS
RESPIRATION RATE: 18 BRPM | OXYGEN SATURATION: 96 % | HEART RATE: 105 BPM | DIASTOLIC BLOOD PRESSURE: 79 MMHG | SYSTOLIC BLOOD PRESSURE: 150 MMHG

## 2022-06-08 VITALS
WEIGHT: 145.06 LBS | HEIGHT: 63 IN | RESPIRATION RATE: 18 BRPM | SYSTOLIC BLOOD PRESSURE: 147 MMHG | HEART RATE: 115 BPM | OXYGEN SATURATION: 96 % | TEMPERATURE: 98 F | DIASTOLIC BLOOD PRESSURE: 81 MMHG

## 2022-06-08 LAB
ALBUMIN SERPL ELPH-MCNC: 3.8 G/DL — SIGNIFICANT CHANGE UP (ref 3.5–5)
ALP SERPL-CCNC: 128 U/L — HIGH (ref 40–120)
ALT FLD-CCNC: 150 U/L DA — HIGH (ref 10–60)
ANION GAP SERPL CALC-SCNC: 24 MMOL/L — HIGH (ref 5–17)
AST SERPL-CCNC: 134 U/L — HIGH (ref 10–40)
BASOPHILS # BLD AUTO: 0.05 K/UL — SIGNIFICANT CHANGE UP (ref 0–0.2)
BASOPHILS NFR BLD AUTO: 1.1 % — SIGNIFICANT CHANGE UP (ref 0–2)
BILIRUB SERPL-MCNC: 0.8 MG/DL — SIGNIFICANT CHANGE UP (ref 0.2–1.2)
BUN SERPL-MCNC: 18 MG/DL — SIGNIFICANT CHANGE UP (ref 7–18)
CALCIUM SERPL-MCNC: 9.3 MG/DL — SIGNIFICANT CHANGE UP (ref 8.4–10.5)
CHLORIDE SERPL-SCNC: 98 MMOL/L — SIGNIFICANT CHANGE UP (ref 96–108)
CO2 SERPL-SCNC: 17 MMOL/L — LOW (ref 22–31)
CREAT SERPL-MCNC: 1.02 MG/DL — SIGNIFICANT CHANGE UP (ref 0.5–1.3)
EGFR: 80 ML/MIN/1.73M2 — SIGNIFICANT CHANGE UP
EOSINOPHIL # BLD AUTO: 0 K/UL — SIGNIFICANT CHANGE UP (ref 0–0.5)
EOSINOPHIL NFR BLD AUTO: 0 % — SIGNIFICANT CHANGE UP (ref 0–6)
ETHANOL SERPL-MCNC: 128 MG/DL — HIGH (ref 0–10)
GLUCOSE SERPL-MCNC: 161 MG/DL — HIGH (ref 70–99)
HCT VFR BLD CALC: 40 % — SIGNIFICANT CHANGE UP (ref 39–50)
HGB BLD-MCNC: 13.8 G/DL — SIGNIFICANT CHANGE UP (ref 13–17)
IMM GRANULOCYTES NFR BLD AUTO: 0.2 % — SIGNIFICANT CHANGE UP (ref 0–1.5)
LIDOCAIN IGE QN: 70 U/L — LOW (ref 73–393)
LYMPHOCYTES # BLD AUTO: 1.15 K/UL — SIGNIFICANT CHANGE UP (ref 1–3.3)
LYMPHOCYTES # BLD AUTO: 24.9 % — SIGNIFICANT CHANGE UP (ref 13–44)
MCHC RBC-ENTMCNC: 33.3 PG — SIGNIFICANT CHANGE UP (ref 27–34)
MCHC RBC-ENTMCNC: 34.5 GM/DL — SIGNIFICANT CHANGE UP (ref 32–36)
MCV RBC AUTO: 96.6 FL — SIGNIFICANT CHANGE UP (ref 80–100)
MONOCYTES # BLD AUTO: 0.36 K/UL — SIGNIFICANT CHANGE UP (ref 0–0.9)
MONOCYTES NFR BLD AUTO: 7.8 % — SIGNIFICANT CHANGE UP (ref 2–14)
NEUTROPHILS # BLD AUTO: 3.05 K/UL — SIGNIFICANT CHANGE UP (ref 1.8–7.4)
NEUTROPHILS NFR BLD AUTO: 66 % — SIGNIFICANT CHANGE UP (ref 43–77)
NRBC # BLD: 0 /100 WBCS — SIGNIFICANT CHANGE UP (ref 0–0)
PLATELET # BLD AUTO: 287 K/UL — SIGNIFICANT CHANGE UP (ref 150–400)
POTASSIUM SERPL-MCNC: 4.4 MMOL/L — SIGNIFICANT CHANGE UP (ref 3.5–5.3)
POTASSIUM SERPL-SCNC: 4.4 MMOL/L — SIGNIFICANT CHANGE UP (ref 3.5–5.3)
PROT SERPL-MCNC: 9.1 G/DL — HIGH (ref 6–8.3)
RBC # BLD: 4.14 M/UL — LOW (ref 4.2–5.8)
RBC # FLD: 14 % — SIGNIFICANT CHANGE UP (ref 10.3–14.5)
SODIUM SERPL-SCNC: 139 MMOL/L — SIGNIFICANT CHANGE UP (ref 135–145)
TROPONIN I, HIGH SENSITIVITY RESULT: 4.6 NG/L — SIGNIFICANT CHANGE UP
WBC # BLD: 4.62 K/UL — SIGNIFICANT CHANGE UP (ref 3.8–10.5)
WBC # FLD AUTO: 4.62 K/UL — SIGNIFICANT CHANGE UP (ref 3.8–10.5)

## 2022-06-08 PROCEDURE — 93005 ELECTROCARDIOGRAM TRACING: CPT

## 2022-06-08 PROCEDURE — 96376 TX/PRO/DX INJ SAME DRUG ADON: CPT

## 2022-06-08 PROCEDURE — 84484 ASSAY OF TROPONIN QUANT: CPT

## 2022-06-08 PROCEDURE — 36415 COLL VENOUS BLD VENIPUNCTURE: CPT

## 2022-06-08 PROCEDURE — 99284 EMERGENCY DEPT VISIT MOD MDM: CPT | Mod: 25

## 2022-06-08 PROCEDURE — 82962 GLUCOSE BLOOD TEST: CPT

## 2022-06-08 PROCEDURE — 96374 THER/PROPH/DIAG INJ IV PUSH: CPT

## 2022-06-08 PROCEDURE — 80307 DRUG TEST PRSMV CHEM ANLYZR: CPT

## 2022-06-08 PROCEDURE — 96375 TX/PRO/DX INJ NEW DRUG ADDON: CPT

## 2022-06-08 PROCEDURE — 99285 EMERGENCY DEPT VISIT HI MDM: CPT

## 2022-06-08 PROCEDURE — 80053 COMPREHEN METABOLIC PANEL: CPT

## 2022-06-08 PROCEDURE — 85025 COMPLETE CBC W/AUTO DIFF WBC: CPT

## 2022-06-08 PROCEDURE — 83690 ASSAY OF LIPASE: CPT

## 2022-06-08 RX ORDER — ONDANSETRON 8 MG/1
4 TABLET, FILM COATED ORAL ONCE
Refills: 0 | Status: COMPLETED | OUTPATIENT
Start: 2022-06-08 | End: 2022-06-08

## 2022-06-08 RX ORDER — SODIUM CHLORIDE 9 MG/ML
1000 INJECTION INTRAMUSCULAR; INTRAVENOUS; SUBCUTANEOUS ONCE
Refills: 0 | Status: COMPLETED | OUTPATIENT
Start: 2022-06-08 | End: 2022-06-08

## 2022-06-08 RX ADMIN — Medication 100 MILLIGRAM(S): at 07:17

## 2022-06-08 RX ADMIN — SODIUM CHLORIDE 1000 MILLILITER(S): 9 INJECTION INTRAMUSCULAR; INTRAVENOUS; SUBCUTANEOUS at 04:50

## 2022-06-08 RX ADMIN — ONDANSETRON 4 MILLIGRAM(S): 8 TABLET, FILM COATED ORAL at 04:49

## 2022-06-08 RX ADMIN — Medication 1 MILLIGRAM(S): at 04:49

## 2022-06-08 RX ADMIN — Medication 1 MILLIGRAM(S): at 07:17

## 2022-06-08 RX ADMIN — SODIUM CHLORIDE 1000 MILLILITER(S): 9 INJECTION INTRAMUSCULAR; INTRAVENOUS; SUBCUTANEOUS at 07:16

## 2022-06-08 NOTE — ED PROVIDER NOTE - CLINICAL SUMMARY MEDICAL DECISION MAKING FREE TEXT BOX
66 year old male with PMHX of hypertension and ETOH abuse presents to the ED with complaints of vomiting tonight. Will obtain EKG and labs, give IV fluids, Zofran, and Ativan, and reassess. 66 year old male with PMHX of hypertension and ETOH abuse presents to the ED with complaints of vomiting tonight. Will obtain EKG and labs, give IV fluids, Zofran, and Ativan, and reassess.  labs show serum alcohol 158  @630am on reeval patient reports resolution of vomiting, mild hand tremors persistent, given ativan/librium, will reasses for improvement of symptoms prior to discharge.

## 2022-06-08 NOTE — ED PROVIDER NOTE - PATIENT PORTAL LINK FT
You can access the FollowMyHealth Patient Portal offered by [facility] by registering at the following website: [email or web address]. By joining [employer]’s FollowMyHealth portal, you will also be able to view your health information using other applications (apps) compatible with our system.

## 2022-06-08 NOTE — ED PROVIDER NOTE - PROGRESS NOTE DETAILS
Pt re-eval no tremor noted, no HA, nausea or anxiety. Ambulatory with steady gait. Tolerating liquids. Educated on labs results, rehab resources given. Educated on s/s to return sooner to ED.

## 2022-06-08 NOTE — ED PROVIDER NOTE - NSFOLLOWUPINSTRUCTIONS_ED_ALL_ED_FT
Síndrome de abstinencia alcohólica    Alcohol Withdrawal Syndrome      El síndrome de abstinencia alcohólica es un conjunto de síntomas que pueden aparecer cuando carolyn persona que manasa mucho y con regularidad lucía de beber o manasa menos que antes. El síndrome de abstinencia alcohólica puede ser leve o grave y puede incluso ser potencialmente mortal.    En general, el síndrome de abstinencia alcohólica afecta a las personas que tienen un trastorno debido al consumo de alcohol, lo que también se denomina alcoholismo. El trastorno debido al consumo de alcohol ocurre cuando carolyn persona no puede controlar cuánto manasa y el hecho de beber demasiado o con mucha frecuencia ocasiona problemas en el hogar, en el trabajo o en las relaciones.      ¿Cuáles son las causas?    El consumo excesivo y frecuente de alcohol provoca cambios en la química del cerebro. Con el paso del tiempo, el cuerpo se vuelve dependiente al alcohol. Cuando se lucía de consumir alcohol, el sistema químico del cerebro se desequilibra y causa los síntomas de abstinencia alcohólica.      ¿Qué incrementa el riesgo?    El síndrome de abstinencia alcohólica es más probable que ocurra en las personas que beben más del límite recomendado de alcohol (2 medidas por día para los hombres o 1 medida por día para las mujeres que no están embarazadas). También es más probable que afecte a las personas que beben demasiado y que consumen alcohol desde hace mucho tiempo. Mientras más harini carolyn persona, y mientras más tiempo lo krish, mayor será el riesgo de síndrome de abstinencia alcohólica.    La abstinencia grave es más probable en carolyn persona que tiene estas características:  •Tuvo abstinencia alcohólica grave en el pasado.      •Tuvo carolyn convulsión jessica un episodio anterior de abstinencia alcohólica.      •Es de edad avanzada.      •Consume otras drogas.      •Tiene un problema médico a marium plazo (crónico), naveed carolyn enfermedad en el corazón, los pulmones o el hígado.      •Tiene depresión.      •No recibe suficientes nutrientes de mendes alimentación (desnutrición).        ¿Cuáles son los signos o síntomas?    Los síntomas de esta afección pueden ser de leves a moderados, o pueden ser graves. Los síntomas pueden aparecer algunas horas (o hasta un día) después de que la persona cambia jammie hábitos de consumo de alcohol. Jessica las 48 horas después de que la persona lucía de beber, los siguientes síntomas pueden desaparecer o mejorar:  •Temblores incontrolables.      •Sudoración.      •Dolor de tristan.      •Ansiedad.      •Incapacidad de relajarse (agitación).      •Problemas para dormir (insomnio).      •Latidos cardíacos irregulares (palpitaciones).      •Sentir carolyn necesidad imperiosa de beber alcohol.      •Convulsiones.      Los siguientes síntomas pueden empeorar entre 24 y 48 horas después de que la persona ha disminuido o abandonado el consumo de alcohol y pueden mejorar gradualmente en el término de días o semanas:  •Náuseas y vómitos.      •Fatiga.      •Sensibilidad a la samson y los sonidos.      •Confusión e incapacidad de pensar con claridad.      •Pérdida del apetito.      •Cambios de humor, irritabilidad, depresión y ansiedad.      •Insomnio y pesadillas.      Los siguientes síntomas son graves y potencialmente mortales. Cuando estos síntomas se presentan simultáneamente, se denominan delirium tremens (DT):  •Presión arterial cristal.      •Aumento de la frecuencia cardíaca.      •Dificultad para respirar.      •Convulsiones. Estos síntomas pueden desaparecer junto con otros síntomas, o pueden persistir.      •Gianna, oír, sentir, oler o percibir el sabor de cosas que no existen (alucinaciones). Las alucinaciones generalmente comienzan entre 12 y 24 horas después de un cambio en los hábitos de consumo.      Cuando carolyn persona tiene delirium tremens, es necesario hospitalizarla de inmediato.      ¿Cómo se diagnostica?    Esta afección se puede diagnosticar en función de lo siguiente:  •Los síntomas y los antecedentes médicos.      •Los antecedentes de consumo de alcohol. El médico puede hacerle preguntas sobre mendes comportamiento con respecto al alcohol. Es importante que responda esas preguntas con sinceridad.      •Carolyn evaluación psicológica.      •Un examen físico.      •Análisis de gail o análisis de orina para medir el nivel de alcohol en la gail y descartar otras causas de los síntomas.      •Resonancia magnética (RM) o exploración por tomografía computarizada (TC). Ivana estudio se puede realizar si la persona tiene pensamientos o comportamientos anormales (estado mental alterado).      El diagnóstico es difícil de realizar. Las personas que tienen abstinencia suelen evitar buscar atención médica y no piensan con claridad. Los amigos y familiares desempeñan un papel importante a la hora de reconocer los síntomas y alentar a jammie seres queridos a recibir tratamiento.      ¿Cómo se trata?    La mayoría de las personas con síntomas de abstinencia pueden recibir tratamiento fuera del entorno hospitalario (tratamiento ambulatorio), con un control estrecho, naveed visitas diarias a un médico y apoyo psicológico. Es posible que el tratamiento deba recibirse en un hospital o centro de tratamiento (tratamiento con hospitalización) cuando la persona:  •Tiene antecedentes de delirium tremens o convulsiones.      •Tiene síntomas graves.      •Es adicta a otras drogas.      •No puede tragar los medicamentos.      •Tiene carolyn enfermedad grave, naveed insuficiencia cardíaca.      •Tuvo abstinencia anteriormente, luisa luego continuó con el consumo de alcohol.      •Es poco probable que respete el programa de un tratamiento ambulatorio.      El tratamiento puede implicar lo siguiente:  •Control de la presión arterial, el pulso y la respiración.      •Líquidos intravenosos (i.v.) para mantener la hidratación.      •Medicamentos para aliviar los síntomas de la abstinencia y el malestar (benzodiazepinas).      •Medicamentos para reducir la ansiedad.      •Medicamentos para prevenir o controlar las convulsiones.      •Multivitaminas y vitaminas B.      •El control diario de un médico.      Es importante recibir tratamiento temprano para la abstinencia alcohólica. Un tratamiento temprano puede contribuir a lo siguiente:  •Acelerar la recuperación de los síntomas de abstinencia.      •Tener más éxito en abandonar el consumo de alcohol a marium plazo (sobriedad).      Si necesita ayuda para dejar de consumir alcohol, el médico puede recomendar un plan de tratamiento a marium plazo que incluya:  •Medicamentos para ayudar a tratar el trastorno por consumo de bebidas alcohólicas.      •Apoyo psicológico para el abuso de sustancias.      •Grupos de apoyo.        Siga estas instrucciones en mendes casa:     •Emlyn los medicamentos de venta migue y los recetados (incluidos los suplementos de vitaminas) solamente naveed se lo haya indicado el médico.      • No harini alcohol.      • No conduzca hasta que el médico se lo autorice.      •Pídale a carolyn persona de mendes confianza que se quede con usted o que esté disponible por si necesita ayuda con jammie síntomas o para no beber.      •Harini suficiente líquido naveed para mantener la orina de color amarillo pálido.      •Considere unirse a un programa de tratamiento o a un adolfo de apoyo para tratar el alcoholismo. Estos pueden brindar apoyo emocional, consejos y orientación.      •Concurra a todas las visitas de seguimiento naveed se lo haya indicado el médico. Winner es importante.        Comuníquese con un médico si:    •Los síntomas empeoran en vez de aliviarse.      •No puede comer ni beber sin vomitar.      •Le está costando demasiado no beber alcohol.      •No puede dejar de beber alcohol.        Solicite ayuda de inmediato si:    •Tiene latidos cardíacos irregulares.      •Siente dolor en el pecho.      •Tiene dificultad para respirar.      •Tiene carolyn convulsión por primera vez.      •Tiene alucinaciones.      •Se siente confundido.        Resumen    •La abstinencia alcohólica es un adolfo de síntomas que pueden aparecer cuando carolyn persona que manasa mucho y con regularidad lucía de beber o manasa menos que antes.      •Los síntomas de esta afección pueden ser de leves a moderados, o pueden ser graves.      •El tratamiento puede incluir hospitalización, medicamentos y apoyo psicológico.

## 2022-06-08 NOTE — ED PROVIDER NOTE - OBJECTIVE STATEMENT
66 year old male with PMHX of hypertension and ETOH abuse presents to the ED with complaints of vomiting tonight. Patient reports that the vomiting began a couple of hours ago, and notes that his last drink was at 19:00 tonight. Patient admits to drinking seven small bottles of tequila daily. Patent denies any current chest pain or abdominal pain, and reports that he feels thirsty in the ED. NKDA.

## 2022-06-08 NOTE — ED PROVIDER NOTE - RELIEVING FACTORS
"Patient requesting sooner appt with PCP to "get back on track", informed there are no sooner appts with provider and advised patient to keep currently scheduled appt. Patient denies any sickness, issues or concerns at this time.   "
----- Message from Keyla Albert sent at 12/18/2018 10:39 AM CST -----  Contact: self   Patient want to speak with a nurse regarding some medical questions please call back at [phone] (home)     
[facility] from the [facility]. [facility] once a month. . Her father passed away in November [facility]     Depression     Tourette's     Type 2 diabetes mellitus [facility]     Type 2 diabetes mellitus (Tsaile Health Centerca 75.) 
none
coherent  Thought content:  Homocidal ideation denies   Suicidal Ideation:  Passive without plan   Delusions:  no evidence of delusions  Perceptual Disturbance:  denies any perceptual disturbance  Cognition:  oriented to person, place, and time  Concentration succeeded  Memory: intact  Insight & Judgement: fair   Medication side effects: none appreciated on exam      DSM 5 DIAGNOSIS:    MDD, recurrent, severe    Suspected domestic violence     Psychosocial and contextual factors:   Patient Active Problem List   Diagnosis    Depression with suicidal ideation    Major depressive disorder, recurrent severe without psychotic features ([facility]    Acute cystitis without hematuria    Candida vaginitis    Type 2 diabetes mellitus without complication ([facility]    [facility] 

## 2022-06-30 NOTE — ED ADULT NURSE NOTE - NS ED NURSE LEVEL OF CONSCIOUSNESS AFFECT
Calm Libtayo Counseling- I discussed with the patient the risks of Libtayo including but not limited to nausea, vomiting, diarrhea, and bone or muscle pain.  The patient verbalized understanding of the proper use and possible adverse effects of Libtayo.  All of the patient's questions and concerns were addressed.

## 2022-07-08 ENCOUNTER — INPATIENT (INPATIENT)
Facility: HOSPITAL | Age: 67
LOS: 1 days | Discharge: LEFT BEFORE DISCHARGE | DRG: 439 | End: 2022-07-10
Attending: STUDENT IN AN ORGANIZED HEALTH CARE EDUCATION/TRAINING PROGRAM | Admitting: STUDENT IN AN ORGANIZED HEALTH CARE EDUCATION/TRAINING PROGRAM
Payer: MEDICAID

## 2022-07-08 VITALS
RESPIRATION RATE: 18 BRPM | DIASTOLIC BLOOD PRESSURE: 78 MMHG | HEIGHT: 63 IN | OXYGEN SATURATION: 98 % | SYSTOLIC BLOOD PRESSURE: 139 MMHG | HEART RATE: 78 BPM | TEMPERATURE: 99 F

## 2022-07-08 DIAGNOSIS — R74.01 ELEVATION OF LEVELS OF LIVER TRANSAMINASE LEVELS: ICD-10-CM

## 2022-07-08 DIAGNOSIS — F10.10 ALCOHOL ABUSE, UNCOMPLICATED: ICD-10-CM

## 2022-07-08 DIAGNOSIS — D69.6 THROMBOCYTOPENIA, UNSPECIFIED: ICD-10-CM

## 2022-07-08 DIAGNOSIS — Z29.9 ENCOUNTER FOR PROPHYLACTIC MEASURES, UNSPECIFIED: ICD-10-CM

## 2022-07-08 DIAGNOSIS — K85.90 ACUTE PANCREATITIS WITHOUT NECROSIS OR INFECTION, UNSPECIFIED: ICD-10-CM

## 2022-07-08 DIAGNOSIS — E87.1 HYPO-OSMOLALITY AND HYPONATREMIA: ICD-10-CM

## 2022-07-08 DIAGNOSIS — E87.6 HYPOKALEMIA: ICD-10-CM

## 2022-07-08 DIAGNOSIS — E11.9 TYPE 2 DIABETES MELLITUS WITHOUT COMPLICATIONS: ICD-10-CM

## 2022-07-08 LAB
ALBUMIN SERPL ELPH-MCNC: 3.1 G/DL — LOW (ref 3.5–5)
ALP SERPL-CCNC: 98 U/L — SIGNIFICANT CHANGE UP (ref 40–120)
ALT FLD-CCNC: 106 U/L DA — HIGH (ref 10–60)
ANION GAP SERPL CALC-SCNC: 18 MMOL/L — HIGH (ref 5–17)
AST SERPL-CCNC: 215 U/L — HIGH (ref 10–40)
BASOPHILS # BLD AUTO: 0.03 K/UL — SIGNIFICANT CHANGE UP (ref 0–0.2)
BASOPHILS NFR BLD AUTO: 0.8 % — SIGNIFICANT CHANGE UP (ref 0–2)
BILIRUB SERPL-MCNC: 0.8 MG/DL — SIGNIFICANT CHANGE UP (ref 0.2–1.2)
BUN SERPL-MCNC: 9 MG/DL — SIGNIFICANT CHANGE UP (ref 7–18)
CALCIUM SERPL-MCNC: 8.6 MG/DL — SIGNIFICANT CHANGE UP (ref 8.4–10.5)
CHLORIDE SERPL-SCNC: 89 MMOL/L — LOW (ref 96–108)
CO2 SERPL-SCNC: 25 MMOL/L — SIGNIFICANT CHANGE UP (ref 22–31)
CREAT SERPL-MCNC: 0.78 MG/DL — SIGNIFICANT CHANGE UP (ref 0.5–1.3)
EGFR: 97 ML/MIN/1.73M2 — SIGNIFICANT CHANGE UP
EOSINOPHIL # BLD AUTO: 0.07 K/UL — SIGNIFICANT CHANGE UP (ref 0–0.5)
EOSINOPHIL NFR BLD AUTO: 1.8 % — SIGNIFICANT CHANGE UP (ref 0–6)
ETHANOL SERPL-MCNC: 291 MG/DL — HIGH (ref 0–10)
GLUCOSE SERPL-MCNC: 268 MG/DL — HIGH (ref 70–99)
HCT VFR BLD CALC: 35.1 % — LOW (ref 39–50)
HGB BLD-MCNC: 12.9 G/DL — LOW (ref 13–17)
IMM GRANULOCYTES NFR BLD AUTO: 0.3 % — SIGNIFICANT CHANGE UP (ref 0–1.5)
LACTATE SERPL-SCNC: 2.2 MMOL/L — HIGH (ref 0.7–2)
LACTATE SERPL-SCNC: 3.2 MMOL/L — HIGH (ref 0.7–2)
LIDOCAIN IGE QN: 1781 U/L — HIGH (ref 73–393)
LYMPHOCYTES # BLD AUTO: 2.1 K/UL — SIGNIFICANT CHANGE UP (ref 1–3.3)
LYMPHOCYTES # BLD AUTO: 52.8 % — HIGH (ref 13–44)
MCHC RBC-ENTMCNC: 34.9 PG — HIGH (ref 27–34)
MCHC RBC-ENTMCNC: 36.8 GM/DL — HIGH (ref 32–36)
MCV RBC AUTO: 94.9 FL — SIGNIFICANT CHANGE UP (ref 80–100)
MONOCYTES # BLD AUTO: 0.4 K/UL — SIGNIFICANT CHANGE UP (ref 0–0.9)
MONOCYTES NFR BLD AUTO: 10.1 % — SIGNIFICANT CHANGE UP (ref 2–14)
NEUTROPHILS # BLD AUTO: 1.37 K/UL — LOW (ref 1.8–7.4)
NEUTROPHILS NFR BLD AUTO: 34.2 % — LOW (ref 43–77)
NRBC # BLD: 0 /100 WBCS — SIGNIFICANT CHANGE UP (ref 0–0)
PLATELET # BLD AUTO: 111 K/UL — LOW (ref 150–400)
POTASSIUM SERPL-MCNC: 3.3 MMOL/L — LOW (ref 3.5–5.3)
POTASSIUM SERPL-SCNC: 3.3 MMOL/L — LOW (ref 3.5–5.3)
PROT SERPL-MCNC: 8.2 G/DL — SIGNIFICANT CHANGE UP (ref 6–8.3)
RBC # BLD: 3.7 M/UL — LOW (ref 4.2–5.8)
RBC # FLD: 12.8 % — SIGNIFICANT CHANGE UP (ref 10.3–14.5)
SARS-COV-2 RNA SPEC QL NAA+PROBE: SIGNIFICANT CHANGE UP
SODIUM SERPL-SCNC: 132 MMOL/L — LOW (ref 135–145)
WBC # BLD: 3.98 K/UL — SIGNIFICANT CHANGE UP (ref 3.8–10.5)
WBC # FLD AUTO: 3.98 K/UL — SIGNIFICANT CHANGE UP (ref 3.8–10.5)

## 2022-07-08 PROCEDURE — 99285 EMERGENCY DEPT VISIT HI MDM: CPT

## 2022-07-08 PROCEDURE — 99223 1ST HOSP IP/OBS HIGH 75: CPT | Mod: GC

## 2022-07-08 RX ORDER — THIAMINE MONONITRATE (VIT B1) 100 MG
500 TABLET ORAL DAILY
Refills: 0 | Status: DISCONTINUED | OUTPATIENT
Start: 2022-07-08 | End: 2022-07-10

## 2022-07-08 RX ORDER — SODIUM CHLORIDE 9 MG/ML
1000 INJECTION INTRAMUSCULAR; INTRAVENOUS; SUBCUTANEOUS
Refills: 0 | Status: DISCONTINUED | OUTPATIENT
Start: 2022-07-08 | End: 2022-07-09

## 2022-07-08 RX ORDER — ONDANSETRON 8 MG/1
4 TABLET, FILM COATED ORAL ONCE
Refills: 0 | Status: COMPLETED | OUTPATIENT
Start: 2022-07-08 | End: 2022-07-08

## 2022-07-08 RX ORDER — POTASSIUM CHLORIDE 20 MEQ
10 PACKET (EA) ORAL
Refills: 0 | Status: COMPLETED | OUTPATIENT
Start: 2022-07-08 | End: 2022-07-08

## 2022-07-08 RX ORDER — SODIUM CHLORIDE 9 MG/ML
1000 INJECTION INTRAMUSCULAR; INTRAVENOUS; SUBCUTANEOUS ONCE
Refills: 0 | Status: COMPLETED | OUTPATIENT
Start: 2022-07-08 | End: 2022-07-08

## 2022-07-08 RX ADMIN — Medication 100 MILLIEQUIVALENT(S): at 18:54

## 2022-07-08 RX ADMIN — SODIUM CHLORIDE 75 MILLILITER(S): 9 INJECTION INTRAMUSCULAR; INTRAVENOUS; SUBCUTANEOUS at 19:08

## 2022-07-08 RX ADMIN — ONDANSETRON 4 MILLIGRAM(S): 8 TABLET, FILM COATED ORAL at 13:10

## 2022-07-08 RX ADMIN — Medication 100 MILLIEQUIVALENT(S): at 20:24

## 2022-07-08 RX ADMIN — Medication 100 MILLIEQUIVALENT(S): at 22:00

## 2022-07-08 RX ADMIN — SODIUM CHLORIDE 1000 MILLILITER(S): 9 INJECTION INTRAMUSCULAR; INTRAVENOUS; SUBCUTANEOUS at 13:11

## 2022-07-08 RX ADMIN — Medication 1 MILLIGRAM(S): at 13:10

## 2022-07-08 NOTE — PATIENT PROFILE ADULT - FALL HARM RISK - HARM RISK INTERVENTIONS
Assistance with ambulation/Assistance OOB with selected safe patient handling equipment/Communicate Risk of Fall with Harm to all staff/Monitor for mental status changes/Monitor gait and stability/Reinforce activity limits and safety measures with patient and family/Tailored Fall Risk Interventions/Toileting schedule using arm’s reach rule for commode and bathroom/Use of alarms - bed, chair and/or voice tab/Visual Cue: Yellow wristband and red socks/Bed in lowest position, wheels locked, appropriate side rails in place/Call bell, personal items and telephone in reach/Instruct patient to call for assistance before getting out of bed or chair/Non-slip footwear when patient is out of bed/Martin to call system/Physically safe environment - no spills, clutter or unnecessary equipment/Purposeful Proactive Rounding/Room/bathroom lighting operational, light cord in reach

## 2022-07-08 NOTE — H&P ADULT - NSHPSOCIALHISTORY_GEN_ALL_CORE
Patient stays at home with his family. Consumes Tequila 4-5 shots on daily basis which is increased to 20-30 shots a day. He does not smoke or consume illicit drugs.

## 2022-07-08 NOTE — H&P ADULT - PROBLEM SELECTOR PLAN 5
PLT count: 111 ( baseline 287)  - 2/2 alcohol abuse PLT count: 111 ( baseline 287)  - 2/2 alcohol abuse  - monitor PLT count

## 2022-07-08 NOTE — ED PROVIDER NOTE - CLINICAL SUMMARY MEDICAL DECISION MAKING FREE TEXT BOX
65 yo M with vomiting  labs w/ pancreatitis  Anticipate etoh w/d, but no signs now, etoh level 291 on labs  Endorsed to unattached Dr Jimenez and MAR

## 2022-07-08 NOTE — H&P ADULT - ASSESSMENT
66 yrs old male with hx of DM and alcohol dependence, presented with vomiting, weakness and epigastric pain. Admitted for acute pancreatitis 2/2 to alcohol abuse, transaminitis and electrolyte abnormalities.  66 yrs old male with hx of DM and alcohol dependence, presented with vomiting, weakness and epigastric pain. Admitted for acute pancreatitis 2/2 to alcohol abuse, transaminitis and electrolyte abnormalities.     PRIMARY TEAM TO CONFIRM MEDICATIONS

## 2022-07-08 NOTE — ED ADULT NURSE NOTE - CHIEF COMPLAINT QUOTE
Clothing as per the pt " I had alcohol yesterday and I started vomiting , I drink alcohol every day '

## 2022-07-08 NOTE — H&P ADULT - PROBLEM SELECTOR PLAN 1
2/2 alcohol abuse   - meets criteria for acute pancreatitis (elevated lipase and epigstric pain)  - blood EtOH level: 291  - CT A&P : No evidence of gallstone, bile duct with normal caliber, pancreas within normal limits.    no bowel obstruction. colonic diverticulosis w/o evidence of diverticulitis. Questionable distal esophageal mural thickening.   - Lipase: 1781,   - Lactate: 3.2   - Keep NPO  - IVF 100ml/12 hrs   - f.u repeat lactate   - monitor for alcohol withdrawal 2/2 alcohol abuse   - meets criteria for acute pancreatitis (elevated lipase and epigstric pain)  - blood EtOH level: 291  - Lipase: 1781,   - Lactate: 3.2   - Keep NPO  - IVF 100ml/12 hrs   - f.u repeat lactate   - monitor for alcohol withdrawal place on CIWA protocol with Ativan pushes  - f/u ct abdomen and pelvis  - advance diet as tolerated

## 2022-07-08 NOTE — ED PROVIDER NOTE - OBJECTIVE STATEMENT
65 yo M pmh of DM and etoh abuse presents with vomiting and abdo pain. Limited historian, but denies other acute complaints.

## 2022-07-08 NOTE — H&P ADULT - ATTENDING COMMENTS
none
66 yrs old male with hx of DM and alcohol dependence, presented with vomiting, weakness and epigastric pain. Admitted for acute pancreatitis 2/2 to alcohol abuse, etoh abuse/withdrawal, transaminitis, pseudohyponatremia and Hypokalemia.    #Acute pancreatitis 2/2 alcohol  #Alcohol abuse/withdrawal  #Transaminitis  #Thrombocytopenia  #Hypokalemia  #Pseudohyponatremia  Lipase 1781, lactate 3.2, etoh 291. Epigastric pain and TTP  - CT A/P with IV contrast to evaluate pancreas  - Repeat lactate s/p IVF  - NPO, IVF  - advance diet as tolerated  - CIWA, Ativan PRN - Patient is still intoxicated with no withdrawal symptoms/tremors, If begans to have symptoms can start librium taper  - Replete K  - Sodium corrected 135  - Monitor liver enzymes, likely 2/2 alcohol  - SW consult   - MVI, Thiamine, folate  - DVT ppx

## 2022-07-08 NOTE — H&P ADULT - HISTORY OF PRESENT ILLNESS
66 yrs old male with hx of DM and alcohol dependence, presented with vomiting, weakness and epigastric pain. Patient reported that he was not able to eat for the past 4 days and tried to get some food from outside where he felt weak and unable to stand. He had nausea and about 10 episodes of vomiting for the past 3 days. He stated abdominal pain mainly in the epigastric area, stabbing in nature without radiation with intensity of 8 out of 10. Denied any diarrhea, urinary symptoms, however has constipation with the last bowel movement 2 days ago. Patient reported occasional shortness of breath without any cough, chest pain. He stated, he had his last drink about 3 days ago. Pt consumes Tequila daily about 4-5 shots but would increase the amount to 20-30 shots per day every now and then.

## 2022-07-08 NOTE — ED PROVIDER NOTE - PHYSICAL EXAMINATION
GENERAL: well appearing, no acute distress   HEAD: atraumatic   EYES: EOMI   ENT: moist oral mucosa   CARDIAC: regular rate  RESPIRATORY: no increased work of breathing   ABDO: soft mild epigastric ttp   MUSCULOSKELETAL: no deformity   NEUROLOGICAL: alert, spontaneous movement of extremities   SKIN: no visible rash  PSYCHIATRIC: cooperative

## 2022-07-08 NOTE — ED ADULT NURSE NOTE - OBJECTIVE STATEMENT
pt is here for vomiting.  BIBA, alcohol yesterday, c/o vomiting, denied chest pain or sob, sleepy at this time,

## 2022-07-08 NOTE — H&P ADULT - NSHPREVIEWOFSYSTEMS_GEN_ALL_CORE
REVIEW OF SYSTEMS:    CONSTITUTIONAL: has weakness, no fevers or chills  EYES/ENT: No visual changes;  No vertigo or throat pain   NECK: No pain or stiffness  RESPIRATORY: No cough, wheezing, hemoptysis; has occasional shortness of breath   CARDIOVASCULAR: No chest pain or palpitations  GASTROINTESTINAL: has epigastric pain. has nausea and vomiting, no hematemesis; No diarrhea , has constipation. No melena or hematochezia.  GENITOURINARY: No dysuria, frequency or hematuria  NEUROLOGICAL: No numbness or weakness  SKIN: No itching, rashes

## 2022-07-08 NOTE — H&P ADULT - NSHPPHYSICALEXAM_GEN_ALL_CORE
GENERAL: NAD, lying in bed comfortably  HEAD:  Atraumatic, Normocephalic  EYES: EOMI, PERRLA, conjunctiva and sclera clear  ENT: Moist mucous membranes  NECK: Supple, No JVD  CHEST/LUNG: Clear to auscultation bilaterally; No rales, rhonchi, wheezing, or rubs. Unlabored respirations  HEART: Regular rate and rhythm; No murmurs, rubs, or gallops  ABDOMEN: Bowel sounds present; Soft, Nondistended, tender at epigastrium  EXTREMITIES:  2+ Peripheral Pulses, brisk capillary refill. No clubbing, cyanosis, or edema  NERVOUS SYSTEM:  Alert & Oriented X3, speech clear. No deficits , No tremors   MSK: FROM all 4 extremities, full and equal strength,   SKIN: No rashes or lesions

## 2022-07-08 NOTE — ED ADULT NURSE NOTE - NSIMPLEMENTINTERV_GEN_ALL_ED
Implemented All Fall Risk Interventions:  [place] to call system. Call bell, personal items and telephone within reach. Instruct patient to call for assistance. Room bathroom lighting operational. Non-slip footwear when patient is off stretcher. Physically safe environment: no spills, clutter or unnecessary equipment. Stretcher in lowest position, wheels locked, appropriate side rails in place. Provide visual cue, wrist band, yellow gown, etc. Monitor gait and stability. Monitor for mental status changes and reorient to person, place, and time. Review medications for side effects contributing to fall risk. Reinforce activity limits and safety measures with patient and family.

## 2022-07-09 DIAGNOSIS — F10.10 ALCOHOL ABUSE, UNCOMPLICATED: ICD-10-CM

## 2022-07-09 LAB
ALBUMIN SERPL ELPH-MCNC: 2.8 G/DL — LOW (ref 3.5–5)
ALP SERPL-CCNC: 88 U/L — SIGNIFICANT CHANGE UP (ref 40–120)
ALT FLD-CCNC: 95 U/L DA — HIGH (ref 10–60)
ANION GAP SERPL CALC-SCNC: 12 MMOL/L — SIGNIFICANT CHANGE UP (ref 5–17)
AST SERPL-CCNC: 167 U/L — HIGH (ref 10–40)
BILIRUB DIRECT SERPL-MCNC: 0.4 MG/DL — HIGH (ref 0–0.3)
BILIRUB SERPL-MCNC: 1.3 MG/DL — HIGH (ref 0.2–1.2)
BUN SERPL-MCNC: 12 MG/DL — SIGNIFICANT CHANGE UP (ref 7–18)
CALCIUM SERPL-MCNC: 8.1 MG/DL — LOW (ref 8.4–10.5)
CHLORIDE SERPL-SCNC: 96 MMOL/L — SIGNIFICANT CHANGE UP (ref 96–108)
CHOLEST SERPL-MCNC: 135 MG/DL — SIGNIFICANT CHANGE UP
CO2 SERPL-SCNC: 24 MMOL/L — SIGNIFICANT CHANGE UP (ref 22–31)
CREAT SERPL-MCNC: 0.67 MG/DL — SIGNIFICANT CHANGE UP (ref 0.5–1.3)
EGFR: 102 ML/MIN/1.73M2 — SIGNIFICANT CHANGE UP
GLUCOSE BLDC GLUCOMTR-MCNC: 140 MG/DL — HIGH (ref 70–99)
GLUCOSE BLDC GLUCOMTR-MCNC: 161 MG/DL — HIGH (ref 70–99)
GLUCOSE BLDC GLUCOMTR-MCNC: 182 MG/DL — HIGH (ref 70–99)
GLUCOSE SERPL-MCNC: 142 MG/DL — HIGH (ref 70–99)
HCT VFR BLD CALC: 34.5 % — LOW (ref 39–50)
HDLC SERPL-MCNC: 72 MG/DL — SIGNIFICANT CHANGE UP
HGB BLD-MCNC: 12.2 G/DL — LOW (ref 13–17)
LACTATE SERPL-SCNC: 0.8 MMOL/L — SIGNIFICANT CHANGE UP (ref 0.7–2)
LIPID PNL WITH DIRECT LDL SERPL: 50 MG/DL — SIGNIFICANT CHANGE UP
MAGNESIUM SERPL-MCNC: 1.6 MG/DL — SIGNIFICANT CHANGE UP (ref 1.6–2.6)
MCHC RBC-ENTMCNC: 34.4 PG — HIGH (ref 27–34)
MCHC RBC-ENTMCNC: 35.4 GM/DL — SIGNIFICANT CHANGE UP (ref 32–36)
MCV RBC AUTO: 97.2 FL — SIGNIFICANT CHANGE UP (ref 80–100)
NON HDL CHOLESTEROL: 63 MG/DL — SIGNIFICANT CHANGE UP
NRBC # BLD: 0 /100 WBCS — SIGNIFICANT CHANGE UP (ref 0–0)
PHOSPHATE SERPL-MCNC: 3.2 MG/DL — SIGNIFICANT CHANGE UP (ref 2.5–4.5)
PLATELET # BLD AUTO: 61 K/UL — LOW (ref 150–400)
POTASSIUM SERPL-MCNC: 3.6 MMOL/L — SIGNIFICANT CHANGE UP (ref 3.5–5.3)
POTASSIUM SERPL-SCNC: 3.6 MMOL/L — SIGNIFICANT CHANGE UP (ref 3.5–5.3)
PROT SERPL-MCNC: 7 G/DL — SIGNIFICANT CHANGE UP (ref 6–8.3)
RBC # BLD: 3.55 M/UL — LOW (ref 4.2–5.8)
RBC # FLD: 13 % — SIGNIFICANT CHANGE UP (ref 10.3–14.5)
SODIUM SERPL-SCNC: 132 MMOL/L — LOW (ref 135–145)
TRIGL SERPL-MCNC: 64 MG/DL — SIGNIFICANT CHANGE UP
WBC # BLD: 4.33 K/UL — SIGNIFICANT CHANGE UP (ref 3.8–10.5)
WBC # FLD AUTO: 4.33 K/UL — SIGNIFICANT CHANGE UP (ref 3.8–10.5)

## 2022-07-09 PROCEDURE — 99233 SBSQ HOSP IP/OBS HIGH 50: CPT | Mod: GC

## 2022-07-09 RX ORDER — DIPHENHYDRAMINE HCL 50 MG
25 CAPSULE ORAL ONCE
Refills: 0 | Status: COMPLETED | OUTPATIENT
Start: 2022-07-09 | End: 2022-07-09

## 2022-07-09 RX ORDER — FOLIC ACID 0.8 MG
1 TABLET ORAL DAILY
Refills: 0 | Status: DISCONTINUED | OUTPATIENT
Start: 2022-07-09 | End: 2022-07-10

## 2022-07-09 RX ORDER — SODIUM CHLORIDE 9 MG/ML
1000 INJECTION, SOLUTION INTRAVENOUS
Refills: 0 | Status: DISCONTINUED | OUTPATIENT
Start: 2022-07-09 | End: 2022-07-10

## 2022-07-09 RX ORDER — ONDANSETRON 8 MG/1
4 TABLET, FILM COATED ORAL ONCE
Refills: 0 | Status: COMPLETED | OUTPATIENT
Start: 2022-07-09 | End: 2022-07-09

## 2022-07-09 RX ORDER — PANTOPRAZOLE SODIUM 20 MG/1
40 TABLET, DELAYED RELEASE ORAL DAILY
Refills: 0 | Status: DISCONTINUED | OUTPATIENT
Start: 2022-07-09 | End: 2022-07-10

## 2022-07-09 RX ORDER — ONDANSETRON 8 MG/1
4 TABLET, FILM COATED ORAL EVERY 8 HOURS
Refills: 0 | Status: DISCONTINUED | OUTPATIENT
Start: 2022-07-09 | End: 2022-07-10

## 2022-07-09 RX ADMIN — SODIUM CHLORIDE 150 MILLILITER(S): 9 INJECTION, SOLUTION INTRAVENOUS at 22:41

## 2022-07-09 RX ADMIN — Medication 50 MILLIGRAM(S): at 14:46

## 2022-07-09 RX ADMIN — ONDANSETRON 4 MILLIGRAM(S): 8 TABLET, FILM COATED ORAL at 02:43

## 2022-07-09 RX ADMIN — PANTOPRAZOLE SODIUM 40 MILLIGRAM(S): 20 TABLET, DELAYED RELEASE ORAL at 02:43

## 2022-07-09 RX ADMIN — Medication 25 MILLIGRAM(S): at 02:44

## 2022-07-09 RX ADMIN — Medication 105 MILLIGRAM(S): at 13:24

## 2022-07-09 RX ADMIN — Medication 50 MILLIGRAM(S): at 22:41

## 2022-07-09 NOTE — PROGRESS NOTE ADULT - SUBJECTIVE AND OBJECTIVE BOX
PGY-1 Progress Note discussed with attending    PLEASE CONTACT ON CALL TEAM:   - On Call Team (Please refer to Karen) FROM 5:00 PM - 8:30PM  - Nightfloat Team FROM 8:30 -7:30 AM    CHIEF COMPLAINT & BRIEF HOSPITAL COURSE:      INTERVAL HPI/OVERNIGHT EVENTS:       REVIEW OF SYSTEMS:  CONSTITUTIONAL: No fever, weight loss, or fatigue  RESPIRATORY: No cough, wheezing, chills or hemoptysis; No shortness of breath  CARDIOVASCULAR: No chest pain, palpitations, dizziness, or leg swelling  GASTROINTESTINAL: No abdominal pain. No nausea, vomiting, or hematemesis; No diarrhea or constipation. No melena or hematochezia.  GENITOURINARY: No dysuria or hematuria, urinary frequency  NEUROLOGICAL: No headaches, memory loss, loss of strength, numbness, or tremors  SKIN: No itching, burning, rashes, or lesions     MEDICATIONS  (STANDING):  pantoprazole  Injectable 40 milliGRAM(s) IV Push daily  sodium chloride 0.9%. 1000 milliLiter(s) (75 mL/Hr) IV Continuous <Continuous>  thiamine IVPB 500 milliGRAM(s) IV Intermittent daily    MEDICATIONS  (PRN):  LORazepam   Injectable 1 milliGRAM(s) IV Push every 2 hours PRN CIWA-Ar score 8 or greater  ondansetron Injectable 4 milliGRAM(s) IV Push every 8 hours PRN Nausea and/or Vomiting      Vital Signs Last 24 Hrs  T(C): 37.1 (09 Jul 2022 04:54), Max: 37.1 (09 Jul 2022 04:54)  T(F): 98.8 (09 Jul 2022 04:54), Max: 98.8 (09 Jul 2022 04:54)  HR: 91 (09 Jul 2022 04:54) (81 - 91)  BP: 134/74 (09 Jul 2022 04:54) (119/65 - 147/87)  BP(mean): --  RR: 16 (09 Jul 2022 04:54) (16 - 18)  SpO2: 94% (09 Jul 2022 04:54) (94% - 99%)    Parameters below as of 09 Jul 2022 04:54  Patient On (Oxygen Delivery Method): room air        PHYSICAL EXAMINATION:  GENERAL: NAD, well built  HEAD:  Atraumatic, Normocephalic  EYES:  conjunctiva and sclera clear  NECK: Supple, No JVD, Normal thyroid  CHEST/LUNG: Clear to auscultation. Clear to percussion bilaterally; No rales, rhonchi, wheezing, or rubs  HEART: Regular rate and rhythm; No murmurs, rubs, or gallops  ABDOMEN: Soft, Nontender, Nondistended; Bowel sounds present  NERVOUS SYSTEM:  Alert & Oriented X3,    EXTREMITIES:  2+ Peripheral Pulses, No clubbing, cyanosis, or edema  SKIN: warm dry                          12.2   4.33  )-----------( x        ( 09 Jul 2022 11:03 )             34.5     07-09    132<L>  |  96  |  12  ----------------------------<  142<H>  3.6   |  24  |  0.67    Ca    8.1<L>      09 Jul 2022 08:37  Phos  3.2     07-09  Mg     1.6     07-09    TPro  7.0  /  Alb  2.8<L>  /  TBili  1.3<H>  /  DBili  0.4<H>  /  AST  167<H>  /  ALT  95<H>  /  AlkPhos  88  07-09    LIVER FUNCTIONS - ( 09 Jul 2022 08:37 )  Alb: 2.8 g/dL / Pro: 7.0 g/dL / ALK PHOS: 88 U/L / ALT: 95 U/L DA / AST: 167 U/L / GGT: x                       I&O's Summary      CAPILLARY BLOOD GLUCOSE      POCT Blood Glucose.: 161 mg/dL (09 Jul 2022 06:54)    CAPILLARY BLOOD GLUCOSE      POCT Blood Glucose.: 161 mg/dL (09 Jul 2022 06:54)  POCT Blood Glucose.: 182 mg/dL (09 Jul 2022 00:23)      RADIOLOGY & ADDITIONAL TESTS:                   PGY-1 Progress Note discussed with attending    PLEASE CONTACT ON CALL TEAM:   - On Call Team (Please refer to Karen) FROM 5:00 PM - 8:30PM  - Nightfloat Team FROM 8:30 -7:30 AM    CHIEF COMPLAINT & BRIEF HOSPITAL COURSE:  66 yrs old male with hx of DM and alcohol dependence, presented with vomiting, weakness and epigastric pain. Patient reported that he was not able to eat for the past 4 days and tried to get some food from outside where he felt weak and unable to stand. He had nausea and about 10 episodes of vomiting for the past 3 days. He stated abdominal pain mainly in the epigastric area, stabbing in nature without radiation with intensity of 8 out of 10. Denied any diarrhea, urinary symptoms, however has constipation with the last bowel movement 2 days ago. Patient reported occasional shortness of breath without any cough, chest pain. He stated, he had his last drink about 3 days ago. Pt consumes Tequila daily about 4-5 shots but would increase the amount to 20-30 shots per day every now and then.     INTERVAL HPI/OVERNIGHT EVENTS:   Overnight, patient experienced 1 episode of vomitting       REVIEW OF SYSTEMS:  CONSTITUTIONAL: No fever, weight loss, or fatigue  RESPIRATORY: No cough, wheezing, chills or hemoptysis; No shortness of breath  CARDIOVASCULAR: No chest pain, palpitations, dizziness, or leg swelling  GASTROINTESTINAL: No abdominal pain. No nausea, vomiting, or hematemesis; No diarrhea or constipation. No melena or hematochezia.  GENITOURINARY: No dysuria or hematuria, urinary frequency  NEUROLOGICAL: No headaches, memory loss, loss of strength, numbness, or tremors  SKIN: No itching, burning, rashes, or lesions     MEDICATIONS  (STANDING):  pantoprazole  Injectable 40 milliGRAM(s) IV Push daily  sodium chloride 0.9%. 1000 milliLiter(s) (75 mL/Hr) IV Continuous <Continuous>  thiamine IVPB 500 milliGRAM(s) IV Intermittent daily    MEDICATIONS  (PRN):  LORazepam   Injectable 1 milliGRAM(s) IV Push every 2 hours PRN CIWA-Ar score 8 or greater  ondansetron Injectable 4 milliGRAM(s) IV Push every 8 hours PRN Nausea and/or Vomiting      Vital Signs Last 24 Hrs  T(C): 37.1 (09 Jul 2022 04:54), Max: 37.1 (09 Jul 2022 04:54)  T(F): 98.8 (09 Jul 2022 04:54), Max: 98.8 (09 Jul 2022 04:54)  HR: 91 (09 Jul 2022 04:54) (81 - 91)  BP: 134/74 (09 Jul 2022 04:54) (119/65 - 147/87)  BP(mean): --  RR: 16 (09 Jul 2022 04:54) (16 - 18)  SpO2: 94% (09 Jul 2022 04:54) (94% - 99%)    Parameters below as of 09 Jul 2022 04:54  Patient On (Oxygen Delivery Method): room air        PHYSICAL EXAMINATION:  GENERAL: NAD, well built  HEAD:  Atraumatic, Normocephalic  EYES:  conjunctiva and sclera clear  NECK: Supple, No JVD, Normal thyroid  CHEST/LUNG: Clear to auscultation. Clear to percussion bilaterally; No rales, rhonchi, wheezing, or rubs  HEART: Regular rate and rhythm; No murmurs, rubs, or gallops  ABDOMEN: Soft, Nontender, Nondistended; Bowel sounds present  NERVOUS SYSTEM:  Alert & Oriented X3,    EXTREMITIES:  2+ Peripheral Pulses, No clubbing, cyanosis, or edema  SKIN: warm dry                          12.2   4.33  )-----------( x        ( 09 Jul 2022 11:03 )             34.5     07-09    132<L>  |  96  |  12  ----------------------------<  142<H>  3.6   |  24  |  0.67    Ca    8.1<L>      09 Jul 2022 08:37  Phos  3.2     07-09  Mg     1.6     07-09    TPro  7.0  /  Alb  2.8<L>  /  TBili  1.3<H>  /  DBili  0.4<H>  /  AST  167<H>  /  ALT  95<H>  /  AlkPhos  88  07-09    LIVER FUNCTIONS - ( 09 Jul 2022 08:37 )  Alb: 2.8 g/dL / Pro: 7.0 g/dL / ALK PHOS: 88 U/L / ALT: 95 U/L DA / AST: 167 U/L / GGT: x                       I&O's Summary      CAPILLARY BLOOD GLUCOSE      POCT Blood Glucose.: 161 mg/dL (09 Jul 2022 06:54)    CAPILLARY BLOOD GLUCOSE      POCT Blood Glucose.: 161 mg/dL (09 Jul 2022 06:54)  POCT Blood Glucose.: 182 mg/dL (09 Jul 2022 00:23)      RADIOLOGY & ADDITIONAL TESTS:                   PGY-1 Progress Note discussed with attending    PLEASE CONTACT ON CALL TEAM:   - On Call Team (Please refer to Karen) FROM 5:00 PM - 8:30PM  - Nightfloat Team FROM 8:30 -7:30 AM    CHIEF COMPLAINT & BRIEF HOSPITAL COURSE:  66 yrs old male with hx of DM and alcohol dependence, presented with vomiting, weakness and epigastric pain. Patient reported that he was not able to eat for the past 4 days and tried to get some food from outside where he felt weak and unable to stand. He had nausea and about 10 episodes of vomiting for the past 3 days. He stated abdominal pain mainly in the epigastric area, stabbing in nature without radiation with intensity of 8 out of 10. Stated he had pancreatitis a few months ago. Denied any diarrhea, urinary symptoms, however has constipation with the last bowel movement 2 days ago. Patient reported occasional shortness of breath without any cough, chest pain. He stated, he had his last drink about 3 days ago. Pt consumes Tequila daily about 4-5 shots but would increase the amount to 20-30 shots per day every now and then.     INTERVAL HPI/OVERNIGHT EVENTS:   Overnight, patient experienced episode of vomiting and the feeling that something was stuck in the throat. Pt provided Zofran, Protonix and benadryl with relief. Pt c/o 2 episodes of diarrhea in the AM. No other overnight events noted.   Pt unsure of PCP's name or location, states that family members would not know either. Primary pharmacy is [facility]; called but closed on weekends.       REVIEW OF SYSTEMS:  CONSTITUTIONAL: No fever, weight loss, or fatigue  RESPIRATORY: No cough, wheezing, chills or hemoptysis; no shortness of breath  CARDIOVASCULAR: No chest pain, palpitations, dizziness, or leg swelling  GASTROINTESTINAL: + abdominal pain, nausea, vomiting and diarrhea. No hematemesis, constipation, melena or hematochezia.  GENITOURINARY: No dysuria or hematuria, urinary frequency  NEUROLOGICAL: + tremors, + mild agitation. No memory loss, loss of strength, numbness, or tremors. No hallucinations.   SKIN: No itching, burning, rashes, or lesions     MEDICATIONS  (STANDING):  chlordiazePOXIDE 50 milliGRAM(s) Oral every 8 hours  folic acid 1 milliGRAM(s) Oral daily  pantoprazole  Injectable 40 milliGRAM(s) IV Push daily  sodium chloride 0.9%. 1000 milliLiter(s) (75 mL/Hr) IV Continuous <Continuous>  thiamine IVPB 500 milliGRAM(s) IV Intermittent daily    MEDICATIONS  (PRN):  LORazepam   Injectable 1 milliGRAM(s) IV Push every 2 hours PRN CIWA-Ar score 8 or greater  ondansetron Injectable 4 milliGRAM(s) IV Push every 8 hours PRN Nausea and/or Vomiting    Vital Signs Last 24 Hrs  T(C): 37.1 (09 Jul 2022 04:54), Max: 37.1 (09 Jul 2022 04:54)  T(F): 98.8 (09 Jul 2022 04:54), Max: 98.8 (09 Jul 2022 04:54)  HR: 91 (09 Jul 2022 04:54) (81 - 91)  BP: 134/74 (09 Jul 2022 04:54) (119/65 - 147/87)  BP(mean): --  RR: 16 (09 Jul 2022 04:54) (16 - 18)  SpO2: 94% (09 Jul 2022 04:54) (94% - 99%)    Parameters below as of 09 Jul 2022 04:54  Patient On (Oxygen Delivery Method): room air    PHYSICAL EXAMINATION:  GENERAL: NAD, male, elderly, hand and facial tremors noted.  HEAD:  Atraumatic, Normocephalic.   EYES:  conjunctiva and sclera clear   NECK: Supple, No JVD, Normal thyroid  CHEST/LUNG: Clear to auscultation. Clear to percussion bilaterally; No rales, rhonchi, wheezing, or rubs  HEART: Regular rate and rhythm; No murmurs, rubs, or gallops  ABDOMEN: Soft, epigastric tenderness, Nondistended; Bowel sounds present  NERVOUS SYSTEM:  Alert & Oriented X3, b/l hand tremor, facial tremor, tongue fasciculations   EXTREMITIES:  2+ Peripheral Pulses, No clubbing, cyanosis, or edema  SKIN: warm dry                          12.2   4.33  )-----------( x        ( 09 Jul 2022 11:03 )             34.5     07-09    132<L>  |  96  |  12  ----------------------------<  142<H>  3.6   |  24  |  0.67    Ca    8.1<L>      09 Jul 2022 08:37  Phos  3.2     07-09  Mg     1.6     07-09    TPro  7.0  /  Alb  2.8<L>  /  TBili  1.3<H>  /  DBili  0.4<H>  /  AST  167<H>  /  ALT  95<H>  /  AlkPhos  88  07-09    LIVER FUNCTIONS - ( 09 Jul 2022 08:37 )  Alb: 2.8 g/dL / Pro: 7.0 g/dL / ALK PHOS: 88 U/L / ALT: 95 U/L DA / AST: 167 U/L / GGT: x           CAPILLARY BLOOD GLUCOSE    POCT Blood Glucose.: 161 mg/dL (09 Jul 2022 06:54)  POCT Blood Glucose.: 182 mg/dL (09 Jul 2022 00:23)      RADIOLOGY & ADDITIONAL TESTS:

## 2022-07-09 NOTE — PROGRESS NOTE ADULT - PROBLEM SELECTOR PLAN 6
Keep NPO due to Acute pancreatitis 2/2 alcohol withdrawal  - insulin sliding scale - blood EtOH level: 291  -   -  consult PLT count: 111 ( baseline 287)  - 2/2 alcohol abuse  - monitor PLT count

## 2022-07-09 NOTE — PROGRESS NOTE ADULT - ATTENDING COMMENTS
66 yrs old male with hx of DM and alcohol dependence, presented with vomiting, weakness and epigastric pain. Admitted for acute pancreatitis 2/2 to alcohol abuse, etoh abuse/withdrawal, transaminitis, pseudohyponatremia and Hypokalemia.    #Acute pancreatitis 2/2 alcohol  #Alcohol abuse/withdrawal  #Transaminitis  #Thrombocytopenia  #Hypokalemia  #Pseudohyponatremia  Lipase 1781, lactate 3.2, etoh 291. Epigastric pain and TTP  - CT A/P with IV contrast to evaluate pancreas  - Advance diet , CLD, IVF  - CIWA, Ativan PRN  - Start librium 50mg q8h  - Replete K  - Sodium corrected 135  - Monitor liver enzymes, likely 2/2 alcohol  - SW consult   - MVI, Thiamine, folate  - DVT ppx .

## 2022-07-09 NOTE — PROGRESS NOTE ADULT - PROBLEM SELECTOR PLAN 1
2/2 alcohol abuse   - meets criteria for acute pancreatitis (elevated lipase and epigstric pain)  - blood EtOH level: 291  - Lipase: 1781,   - Lactate: 3.2   - Keep NPO  - IVF 100ml/12 hrs   - f.u repeat lactate   - monitor for alcohol withdrawal place on CIWA protocol with Ativan pushes  - f/u ct abdomen and pelvis  - advance diet as tolerated 2/2 alcohol abuse   - meets criteria for acute pancreatitis (elevated lipase and epigstric pain)  - blood EtOH level: 291  - Lipase: 1781, Lactate: downtrending (0.8)   - advance to liquid diet  - IV NS 100ml/75 hrs   - monitor for alcohol withdrawal place on CIWA protocol with Ativan pushes and librium 50mg q8hrs   - f/u ct abdomen and pelvis  - advance diet as tolerated 2/2 alcohol abuse   - meets criteria for acute pancreatitis (elevated lipase and epigastric pain)  - blood EtOH level: 291  - Lipase: 1781, Lactate: downtrending (0.8)   - advance to clear liquid diet  - IV NS 100ml/75 hrs   - f/u ct abdomen and pelvis  - Zofran 4mg q8hrs PRN & Protonix 40mg qd  - advance diet as tolerated

## 2022-07-09 NOTE — PROGRESS NOTE ADULT - PROBLEM SELECTOR PLAN 5
PLT count: 111 ( baseline 287)  - 2/2 alcohol abuse  - monitor PLT count - insulin sliding scale  - liquid diet  - monitor BG - 2/2 to alcohol abuse   - AST:215 , ALT :106 (AST:ALT 2:1 ratio)

## 2022-07-09 NOTE — PROGRESS NOTE ADULT - PROBLEM SELECTOR PLAN 2
- 2/2 to alcohol abuse   - AST:215 , ALT :106 (AST:ALT 2:1 ratio) - last drink on 7/6  - blood EtOH level: 291  - Librium 50mg q8 x 2 days; then 25mg BID x2 days  - CIWA protocol with ativan pushes 1 mg q2 hrs PRN   - folic acid & thiamine   -  consult   - monitor for alcohol withdrawal

## 2022-07-09 NOTE — CHART NOTE - NSCHARTNOTEFT_GEN_A_CORE
RN informed pt was feeling something stuck in the throat. Went and examined patient at bedside. Used  Juliann (ID# [id number]), pt said he has been vomiting and now feeling something in the throat which is new. Examined his oral cavity and found his uvula to be inflamed likely secondary to recurrent vomiting and reflex. Gave one dose of protonix, zofran and benadryl for relief. Will reassess.

## 2022-07-09 NOTE — PROGRESS NOTE ADULT - PROBLEM SELECTOR PLAN 3
- K level 3.3   - replace with KCl 10 mEq x3  - f/u repeat K in AM - Na level 132  - IV NS 75 ml/ 12 hrs  - f/u repeat Na in AM

## 2022-07-09 NOTE — PROGRESS NOTE ADULT - PROBLEM SELECTOR PLAN 4
- Na level 132  -  ml/ 12 hrs  - f/u repeat Na in AM PLT count: 111 ( baseline 287)  - 2/2 alcohol abuse  - monitor PLT count - insulin sliding scale  - liquid diet  - monitor BG

## 2022-07-09 NOTE — PROGRESS NOTE ADULT - ASSESSMENT
66 yrs old male with hx of DM and alcohol dependence, presented with vomiting, weakness and epigastric pain. Admitted for acute pancreatitis 2/2 to alcohol abuse, transaminitis and electrolyte abnormalities.     PRIMARY TEAM TO CONFIRM MEDICATIONS 66 yrs old male with hx of DM and alcohol dependence, presented with vomiting, weakness and epigastric pain. Admitted for acute pancreatitis 2/2 to alcohol abuse, transaminitis and electrolyte abnormalities.

## 2022-07-10 VITALS
RESPIRATION RATE: 17 BRPM | DIASTOLIC BLOOD PRESSURE: 80 MMHG | SYSTOLIC BLOOD PRESSURE: 142 MMHG | HEART RATE: 103 BPM | OXYGEN SATURATION: 99 % | TEMPERATURE: 98 F

## 2022-07-10 LAB
ALBUMIN SERPL ELPH-MCNC: 2.7 G/DL — LOW (ref 3.5–5)
ALP SERPL-CCNC: 83 U/L — SIGNIFICANT CHANGE UP (ref 40–120)
ALT FLD-CCNC: 103 U/L DA — HIGH (ref 10–60)
ANION GAP SERPL CALC-SCNC: 11 MMOL/L — SIGNIFICANT CHANGE UP (ref 5–17)
AST SERPL-CCNC: 169 U/L — HIGH (ref 10–40)
BILIRUB DIRECT SERPL-MCNC: 0.4 MG/DL — HIGH (ref 0–0.3)
BILIRUB SERPL-MCNC: 1.1 MG/DL — SIGNIFICANT CHANGE UP (ref 0.2–1.2)
BUN SERPL-MCNC: 6 MG/DL — LOW (ref 7–18)
CALCIUM SERPL-MCNC: 8.5 MG/DL — SIGNIFICANT CHANGE UP (ref 8.4–10.5)
CHLORIDE SERPL-SCNC: 98 MMOL/L — SIGNIFICANT CHANGE UP (ref 96–108)
CO2 SERPL-SCNC: 26 MMOL/L — SIGNIFICANT CHANGE UP (ref 22–31)
CREAT SERPL-MCNC: 0.57 MG/DL — SIGNIFICANT CHANGE UP (ref 0.5–1.3)
EGFR: 107 ML/MIN/1.73M2 — SIGNIFICANT CHANGE UP
GLUCOSE BLDC GLUCOMTR-MCNC: 167 MG/DL — HIGH (ref 70–99)
GLUCOSE BLDC GLUCOMTR-MCNC: 251 MG/DL — HIGH (ref 70–99)
GLUCOSE SERPL-MCNC: 174 MG/DL — HIGH (ref 70–99)
HCT VFR BLD CALC: 34.9 % — LOW (ref 39–50)
HGB BLD-MCNC: 12.8 G/DL — LOW (ref 13–17)
MAGNESIUM SERPL-MCNC: 1.7 MG/DL — SIGNIFICANT CHANGE UP (ref 1.6–2.6)
MCHC RBC-ENTMCNC: 34.5 PG — HIGH (ref 27–34)
MCHC RBC-ENTMCNC: 36.7 GM/DL — HIGH (ref 32–36)
MCV RBC AUTO: 94.1 FL — SIGNIFICANT CHANGE UP (ref 80–100)
NRBC # BLD: 0 /100 WBCS — SIGNIFICANT CHANGE UP (ref 0–0)
PHOSPHATE SERPL-MCNC: 2.9 MG/DL — SIGNIFICANT CHANGE UP (ref 2.5–4.5)
PLATELET # BLD AUTO: 114 K/UL — LOW (ref 150–400)
POTASSIUM SERPL-MCNC: 3.3 MMOL/L — LOW (ref 3.5–5.3)
POTASSIUM SERPL-SCNC: 3.3 MMOL/L — LOW (ref 3.5–5.3)
PROT SERPL-MCNC: 7.2 G/DL — SIGNIFICANT CHANGE UP (ref 6–8.3)
RBC # BLD: 3.71 M/UL — LOW (ref 4.2–5.8)
RBC # FLD: 13 % — SIGNIFICANT CHANGE UP (ref 10.3–14.5)
SODIUM SERPL-SCNC: 135 MMOL/L — SIGNIFICANT CHANGE UP (ref 135–145)
WBC # BLD: 3.44 K/UL — LOW (ref 3.8–10.5)
WBC # FLD AUTO: 3.44 K/UL — LOW (ref 3.8–10.5)

## 2022-07-10 PROCEDURE — 82248 BILIRUBIN DIRECT: CPT

## 2022-07-10 PROCEDURE — 80307 DRUG TEST PRSMV CHEM ANLYZR: CPT

## 2022-07-10 PROCEDURE — 83036 HEMOGLOBIN GLYCOSYLATED A1C: CPT

## 2022-07-10 PROCEDURE — 83735 ASSAY OF MAGNESIUM: CPT

## 2022-07-10 PROCEDURE — 85027 COMPLETE CBC AUTOMATED: CPT

## 2022-07-10 PROCEDURE — 83690 ASSAY OF LIPASE: CPT

## 2022-07-10 PROCEDURE — 87635 SARS-COV-2 COVID-19 AMP PRB: CPT

## 2022-07-10 PROCEDURE — 80053 COMPREHEN METABOLIC PANEL: CPT

## 2022-07-10 PROCEDURE — 80061 LIPID PANEL: CPT

## 2022-07-10 PROCEDURE — 99239 HOSP IP/OBS DSCHRG MGMT >30: CPT | Mod: GC

## 2022-07-10 PROCEDURE — 84100 ASSAY OF PHOSPHORUS: CPT

## 2022-07-10 PROCEDURE — 96375 TX/PRO/DX INJ NEW DRUG ADDON: CPT

## 2022-07-10 PROCEDURE — 85025 COMPLETE CBC W/AUTO DIFF WBC: CPT

## 2022-07-10 PROCEDURE — 83605 ASSAY OF LACTIC ACID: CPT

## 2022-07-10 PROCEDURE — 99285 EMERGENCY DEPT VISIT HI MDM: CPT

## 2022-07-10 PROCEDURE — 36415 COLL VENOUS BLD VENIPUNCTURE: CPT

## 2022-07-10 PROCEDURE — 82962 GLUCOSE BLOOD TEST: CPT

## 2022-07-10 RX ORDER — POTASSIUM CHLORIDE 20 MEQ
40 PACKET (EA) ORAL EVERY 4 HOURS
Refills: 0 | Status: DISCONTINUED | OUTPATIENT
Start: 2022-07-10 | End: 2022-07-10

## 2022-07-10 RX ADMIN — SODIUM CHLORIDE 150 MILLILITER(S): 9 INJECTION, SOLUTION INTRAVENOUS at 11:41

## 2022-07-10 RX ADMIN — Medication 40 MILLIEQUIVALENT(S): at 08:59

## 2022-07-10 RX ADMIN — Medication 1 MILLIGRAM(S): at 11:41

## 2022-07-10 RX ADMIN — PANTOPRAZOLE SODIUM 40 MILLIGRAM(S): 20 TABLET, DELAYED RELEASE ORAL at 11:40

## 2022-07-10 RX ADMIN — Medication 50 MILLIGRAM(S): at 05:37

## 2022-07-10 RX ADMIN — Medication 105 MILLIGRAM(S): at 11:41

## 2022-07-10 NOTE — PROGRESS NOTE ADULT - PROBLEM SELECTOR PLAN 1
2/2 alcohol abuse   - meets criteria for acute pancreatitis (elevated lipase and epigastric pain)  - blood EtOH level: 291  - Lipase: 1781, Lactate: downtrending (0.8)   - advance to clear liquid diet  - IV NS 100ml/75 hrs   - f/u ct abdomen and pelvis  - Zofran 4mg q8hrs PRN & Protonix 40mg qd  - advance diet as tolerated 2/2 alcohol abuse   - meets criteria for acute pancreatitis (elevated lipase and epigastric pain)  - blood EtOH level: 291  - Lipase: 1781, Lactate: downtrending (0.8)   - advance to regular diet as he tolerated clear liquid  - IV NS 100ml/75 hrs   - f/u ct abdomen and pelvis  - Zofran 4mg q8hrs PRN & Protonix 40mg qd  - advance diet as tolerated

## 2022-07-10 NOTE — PROGRESS NOTE ADULT - PROBLEM SELECTOR PLAN 2
- last drink on 7/6  - blood EtOH level: 291  - Librium 50mg q8 x 2 days; then 25mg BID x2 days  - CIWA protocol with ativan pushes 1 mg q2 hrs PRN   - folic acid & thiamine   -  consult   - monitor for alcohol withdrawal

## 2022-07-10 NOTE — PROGRESS NOTE ADULT - ASSESSMENT
66 yrs old male with hx of DM and alcohol dependence, presented with vomiting, weakness and epigastric pain. Admitted for acute pancreatitis 2/2 to alcohol abuse, transaminitis and electrolyte abnormalities.

## 2022-07-10 NOTE — DISCHARGE NOTE PROVIDER - ATTENDING DISCHARGE PHYSICAL EXAMINATION:
Patient admitted for alcohol abuse, withdrawal and pancreatitis. Patient given IVF and slowly advancing diet. Initially placed on librium taper with low CIWA scores. Patient is requesting to leave AMA. Discussed extensively regarding risk of leaving AMA. Patient understands risk and has capacity to make decisions. Patient left AMA.   PHYSICAL EXAMINATION:  GENERAL: NAD, male, elderly, hand and facial tremors noted.  HEAD:  Atraumatic, Normocephalic.   EYES:  conjunctiva and sclera clear   NECK: Supple, No JVD, Normal thyroid  CHEST/LUNG: Clear to auscultation. Clear to percussion bilaterally; No rales, rhonchi, wheezing, or rubs  HEART: Regular rate and rhythm; No murmurs, rubs, or gallops  ABDOMEN: Soft, epigastric tenderness, Nondistended; Bowel sounds present  NERVOUS SYSTEM:  Alert & Oriented X3, b/l hand tremor, facial tremor, tongue fasciculations   EXTREMITIES:  2+ Peripheral Pulses, No clubbing, cyanosis, or edema  SKIN: warm dry

## 2022-07-10 NOTE — PROGRESS NOTE ADULT - SUBJECTIVE AND OBJECTIVE BOX
PGY-1 Progress Note discussed with attending    PAGER #: [[phone]] TILL 5:00 PM  PLEASE CONTACT ON CALL TEAM:  - On Call Team (Please refer to Karen) FROM 5:00 PM - 8:30PM  - Nightfloat Team FROM 8:30 -7:30 AM    CHIEF COMPLAINT & BRIEF HOSPITAL COURSE:    INTERVAL HPI/OVERNIGHT EVENTS:       REVIEW OF SYSTEMS:  CONSTITUTIONAL: No fever, weight loss, or fatigue  RESPIRATORY: No cough, wheezing, chills or hemoptysis; No shortness of breath  CARDIOVASCULAR: No chest pain, palpitations, dizziness, or leg swelling  GASTROINTESTINAL: No abdominal pain. No nausea, vomiting, or hematemesis; No diarrhea or constipation. No melena or hematochezia.  GENITOURINARY: No dysuria or hematuria, urinary frequency  NEUROLOGICAL: No headaches, memory loss, loss of strength, numbness, or tremors  SKIN: No itching, burning, rashes, or lesions     MEDICATIONS  (STANDING):  chlordiazePOXIDE 50 milliGRAM(s) Oral two times a day  folic acid 1 milliGRAM(s) Oral daily  lactated ringers. 1000 milliLiter(s) (150 mL/Hr) IV Continuous <Continuous>  pantoprazole  Injectable 40 milliGRAM(s) IV Push daily  potassium chloride   Powder 40 milliEquivalent(s) Oral every 4 hours  thiamine IVPB 500 milliGRAM(s) IV Intermittent daily    MEDICATIONS  (PRN):  LORazepam   Injectable 1 milliGRAM(s) IV Push every 2 hours PRN CIWA-Ar score 8 or greater  ondansetron Injectable 4 milliGRAM(s) IV Push every 8 hours PRN Nausea and/or Vomiting      Vital Signs Last 24 Hrs  T(C): 36.7 (10 Jul 2022 06:39), Max: 36.9 (09 Jul 2022 13:17)  T(F): 98.1 (10 Jul 2022 06:39), Max: 98.4 (09 Jul 2022 13:17)  HR: 79 (10 Jul 2022 06:39) (79 - 87)  BP: 122/73 (10 Jul 2022 06:39) (122/73 - 159/87)  BP(mean): --  RR: 18 (10 Jul 2022 06:39) (17 - 18)  SpO2: 98% (10 Jul 2022 06:39) (96% - 98%)    Parameters below as of 10 Jul 2022 06:39  Patient On (Oxygen Delivery Method): room air        PHYSICAL EXAMINATION:  GENERAL: NAD, well built  HEAD:  Atraumatic, Normocephalic  EYES:  conjunctiva and sclera clear  NECK: Supple, No JVD, Normal thyroid  CHEST/LUNG: Clear to auscultation. Clear to percussion bilaterally; No rales, rhonchi, wheezing, or rubs  HEART: Regular rate and rhythm; No murmurs, rubs, or gallops  ABDOMEN: Soft, Nontender, Nondistended; Bowel sounds present  NERVOUS SYSTEM:  Alert & Oriented X3,    EXTREMITIES:  2+ Peripheral Pulses, No clubbing, cyanosis, or edema  SKIN: warm dry                          12.8   3.44  )-----------( 114      ( 10 Jul 2022 07:03 )             34.9     07-10    135  |  98  |  6<L>  ----------------------------<  174<H>  3.3<L>   |  26  |  0.57    Ca    8.5      10 Jul 2022 07:03  Phos  2.9     07-10  Mg     1.7     07-10    TPro  7.2  /  Alb  2.7<L>  /  TBili  1.1  /  DBili  0.4<H>  /  AST  169<H>  /  ALT  103<H>  /  AlkPhos  83  07-10    LIVER FUNCTIONS - ( 10 Jul 2022 07:03 )  Alb: 2.7 g/dL / Pro: 7.2 g/dL / ALK PHOS: 83 U/L / ALT: 103 U/L DA / AST: 169 U/L / GGT: x                       CAPILLARY BLOOD GLUCOSE  CAPILLARY BLOOD GLUCOSE      POCT Blood Glucose.: 167 mg/dL (10 Jul 2022 07:46)    CAPILLARY BLOOD GLUCOSE      POCT Blood Glucose.: 167 mg/dL (10 Jul 2022 07:46)  POCT Blood Glucose.: 140 mg/dL (09 Jul 2022 12:00)      RADIOLOGY & ADDITIONAL TESTS:                   PGY-1 Progress Note discussed with attending    PAGER #: [[phone]] TILL 5:00 PM  PLEASE CONTACT ON CALL TEAM:  - On Call Team (Please refer to Karen) FROM 5:00 PM - 8:30PM  - Nightfloat Team FROM 8:30 -7:30 AM    INTERVAL HPI/OVERNIGHT EVENTS:   Patient examined bedside, he is comfirtable, CIWA 1 on my assessment , he tolerated clear liquid diet , patient wants to go home for getting his stuff from his old apartment , will advance diet and reassess.     REVIEW OF SYSTEMS:  CONSTITUTIONAL: No fever, weight loss, or fatigue  RESPIRATORY: No cough, wheezing, chills or hemoptysis; No shortness of breath  CARDIOVASCULAR: No chest pain, palpitations, dizziness, or leg swelling  GASTROINTESTINAL: No abdominal pain. No nausea, vomiting, or hematemesis; No diarrhea or constipation. No melena or hematochezia.  GENITOURINARY: No dysuria or hematuria, urinary frequency  NEUROLOGICAL: No headaches, memory loss, loss of strength, numbness, or tremors  SKIN: No itching, burning, rashes, or lesions     MEDICATIONS  (STANDING):  chlordiazePOXIDE 50 milliGRAM(s) Oral two times a day  folic acid 1 milliGRAM(s) Oral daily  lactated ringers. 1000 milliLiter(s) (150 mL/Hr) IV Continuous <Continuous>  pantoprazole  Injectable 40 milliGRAM(s) IV Push daily  potassium chloride   Powder 40 milliEquivalent(s) Oral every 4 hours  thiamine IVPB 500 milliGRAM(s) IV Intermittent daily    MEDICATIONS  (PRN):  LORazepam   Injectable 1 milliGRAM(s) IV Push every 2 hours PRN CIWA-Ar score 8 or greater  ondansetron Injectable 4 milliGRAM(s) IV Push every 8 hours PRN Nausea and/or Vomiting      Vital Signs Last 24 Hrs  T(C): 36.7 (10 Jul 2022 06:39), Max: 36.9 (09 Jul 2022 13:17)  T(F): 98.1 (10 Jul 2022 06:39), Max: 98.4 (09 Jul 2022 13:17)  HR: 79 (10 Jul 2022 06:39) (79 - 87)  BP: 122/73 (10 Jul 2022 06:39) (122/73 - 159/87)  BP(mean): --  RR: 18 (10 Jul 2022 06:39) (17 - 18)  SpO2: 98% (10 Jul 2022 06:39) (96% - 98%)    Parameters below as of 10 Jul 2022 06:39  Patient On (Oxygen Delivery Method): room air        PHYSICAL EXAMINATION:  GENERAL: NAD, well built  HEAD:  Atraumatic, Normocephalic  EYES:  conjunctiva and sclera clear  NECK: Supple, No JVD, Normal thyroid  CHEST/LUNG: Clear to auscultation. Clear to percussion bilaterally; No rales, rhonchi, wheezing, or rubs  HEART: Regular rate and rhythm; No murmurs, rubs, or gallops  ABDOMEN: Soft, Nontender, Nondistended; Bowel sounds present  NERVOUS SYSTEM:  Alert & Oriented X3,  mild tremors  EXTREMITIES:  2+ Peripheral Pulses, No clubbing, cyanosis, or edema  SKIN: warm dry                          12.8   3.44  )-----------( 114      ( 10 Jul 2022 07:03 )             34.9     07-10    135  |  98  |  6<L>  ----------------------------<  174<H>  3.3<L>   |  26  |  0.57    Ca    8.5      10 Jul 2022 07:03  Phos  2.9     07-10  Mg     1.7     07-10    TPro  7.2  /  Alb  2.7<L>  /  TBili  1.1  /  DBili  0.4<H>  /  AST  169<H>  /  ALT  103<H>  /  AlkPhos  83  07-10    LIVER FUNCTIONS - ( 10 Jul 2022 07:03 )  Alb: 2.7 g/dL / Pro: 7.2 g/dL / ALK PHOS: 83 U/L / ALT: 103 U/L DA / AST: 169 U/L / GGT: x                       CAPILLARY BLOOD GLUCOSE  CAPILLARY BLOOD GLUCOSE      POCT Blood Glucose.: 167 mg/dL (10 Jul 2022 07:46)    CAPILLARY BLOOD GLUCOSE      POCT Blood Glucose.: 167 mg/dL (10 Jul 2022 07:46)  POCT Blood Glucose.: 140 mg/dL (09 Jul 2022 12:00)      RADIOLOGY & ADDITIONAL TESTS:

## 2022-07-10 NOTE — DISCHARGE NOTE NURSING/CASE MANAGEMENT/SOCIAL WORK - NSDCPEFALRISK_GEN_ALL_CORE
For information on Fall & Injury Prevention, visit: [email or web address] OR  [email or web address] OR  https://www.cdc.gov/steadi/patient.html

## 2022-07-10 NOTE — DISCHARGE NOTE PROVIDER - NSDCCPCAREPLAN_GEN_ALL_CORE_FT
PRINCIPAL DISCHARGE DIAGNOSIS  Diagnosis: Pancreatitis  Assessment and Plan of Treatment: You presented with vomiting, weakness and epigastric pain. You reported that You were not able to eat for the past 4 days and tried to get some food from outside where You  felt weak and unable to stand.  You stated, You had your last drink about 3 days ago. You met  criteria for acute pancreatitis (elevated lipase and epigastric pain)   blood EtOH level: 291, Lipase: 1781, You were started on aggressive hydration and clear liquid diet .   You requested to leave against medical advice   You are welcome to return back if you change your mind.  please try to seek patient care as soon as possible        SECONDARY DISCHARGE DIAGNOSES  Diagnosis: Alcohol abuse  Assessment and Plan of Treatment: You were started on librium taper along with ativan as needed .You were  started on folic acid and thiamin ,  was consulted , You  wanted to leave AMA.    Diagnosis: DM (diabetes mellitus)  Assessment and Plan of Treatment: Maintaining blood glucose level within normal range.  - You have a history of diabetes  - You should continue to take your medication regimen regularly as prescribed  - Please follow up with your primary care provider/endocrinologist within a week of discharge.  - You need to continue monitoring your blood sugar levels closely.  - Please maintain healthy lifestyle by eating healthy diabetic regimen, weight loss and exercise regularly as tolerated.  Make sure you get your HgA1c checked every three months.      Diagnosis: Hyponatremia  Assessment and Plan of Treatment: You presented with low Na level that improved with fluids.

## 2022-07-10 NOTE — DISCHARGE NOTE NURSING/CASE MANAGEMENT/SOCIAL WORK - PATIENT PORTAL LINK FT
You can access the FollowMyHealth Patient Portal offered by [facility] by registering at the following website: [email or web address]. By joining [facility]’s FollowMyHealth portal, you will also be able to view your health information using other applications (apps) compatible with our system.

## 2022-07-10 NOTE — DISCHARGE NOTE PROVIDER - HOSPITAL COURSE
66 yrs old male with hx of DM and alcohol dependence, presented with vomiting, weakness and epigastric pain. Patient reported that he was not able to eat for the past 4 days and tried to get some food from outside where he felt weak and unable to stand.  He stated, he had his last drink about 3 days ago. Pt consumes Tequila daily about 4-5 shots but would increase the amount to 20-30 shots per day every now and then. Patient meets criteria for acute pancreatitis (elevated lipase and epigastric pain)   blood EtOH level: 291, Lipase: 1781, Patient was started on aggressive hydration and clear liquid diet . For alcohol withdrawal patient was started on librium taper along with ativan as needed . patient was started on folic acid and thiamin ,  was consulted , Patient wanted to leave AMA  The patient has requested to leave  against medical advice. Reason(s) for leaving include the following: I believe this patient is of sound mind and competent to refuse medical care. The patient is answering and asking questions appropriately. Patient is not intoxicated and do not appear to be under the influence of any illicit drugs at this time. Patient is oriented to person, place and time. Patient is not psychotic, delusional, suicidal, homicidal or hallucinating. Patient demonstrates a normal mental capacity to make decisions regarding their healthcare. The patient has been advised of the risks of leaving AMA, which include but are not limited to death, coma, permanent disability, loss of current lifestyle, delay in diagnosis  The patient has been advised that should change his mind; they are totally welcome to return, here, at any time. The patient understands that in no way does an AMA discharge mean that I do not want them to have the best medical care available. To this end, I have provided appropriate prescriptions, referrals, and discharge instructions. and notified the attending    This patient has signed the AMA form

## 2022-07-15 NOTE — ED ADULT TRIAGE NOTE - AS HEIGHT TYPE
stated
[facility]  Hematology/Oncology  [address] [place] [address]  Phone: [phone]  Fax:     An OB/GYN physician  Obstetrics & Gynecology  .  [place]   Phone:   Fax:

## 2022-09-26 NOTE — ED PROVIDER NOTE - CCCP TRG CHIEF CMPLNT
PAST MEDICAL HISTORY:  Vic's disease     H/O adrenal insufficiency     H/O: HTN (hypertension)     HLD (hyperlipidemia)     Hypothyroidism      pt stated he drinks a lot of alcohol and wants a detox/alcohol intoxication

## 2022-10-07 NOTE — PROGRESS NOTE ADULT - PROBLEM SELECTOR PLAN 4
Pt with active alcohol use    c/w thiamine, folate, multivitamin  c/w IVF  CIWA monitoring  PRN ativan if pt becomes agitated  Social work consult 5

## 2022-11-01 NOTE — PROGRESS NOTE ADULT - PROBLEM SELECTOR PROBLEM 1
Alcoholic ketosis Price (Do Not Change): 0.00 Detail Level: Simple Body Of Note (Please Add Your Own Text Here): Patient not due, needs to schedule appropriately when due for FBSE. Instructions: This plan will send the code FBSD to the PM system.  DO NOT or CHANGE the price.

## 2023-01-19 NOTE — ED ADULT TRIAGE NOTE - TEMPERATURE IN CELSIUS (DEGREES C)
Venipuncture Blood Specimen Collection  Venipuncture performed in R ARM by Reanna Waller MA with good hemostasis. Patient tolerated the procedure well without complications.   01/19/23   Reanna Waller MA     37.1

## 2023-02-09 NOTE — SBIRT NOTE ADULT - NSSBIRTFEELGUILT_GEN_A_CORE
ON OR ABOUT THIS TIME RECEIVED CALL FROM BELL/ICU RN IN REGARDS TO DESCENDING SATURATION TO 
87%-88% VIA BIPAP AT 40%; OKAY FOR RN TO INCREASE FIO2 TO 45% n/a

## 2023-03-21 NOTE — PATIENT PROFILE ADULT - NSPROEXTENSIONSOFSELF_GEN_A_NUR
none Minocycline Counseling: Patient advised regarding possible photosensitivity and discoloration of the teeth, skin, lips, tongue and gums.  Patient instructed to avoid sunlight, if possible.  When exposed to sunlight, patients should wear protective clothing, sunglasses, and sunscreen.  The patient was instructed to call the office immediately if the following severe adverse effects occur:  hearing changes, easy bruising/bleeding, severe headache, or vision changes.  The patient verbalized understanding of the proper use and possible adverse effects of minocycline.  All of the patient's questions and concerns were addressed.

## 2023-05-20 NOTE — PATIENT PROFILE ADULT - NSPROEXTENSIONSOFSELF_GEN_A_NUR
CERTIFICATE OF WORK     5/20/2023      Re: Stephani Zamudio        [address]  [facility] [address]      This is to certify that Stephani Zamudio has been under my care from5/20/2023 and can return to regular work on Monday May 22nd.        SIGNATURE:___________________________________________,   5/20/2023  Kade Morrow,         [facility] [address]  [facility] [address] [id number] [phone]    
soiled clothing at bedside

## 2023-06-01 NOTE — PATIENT PROFILE ADULT - HAS THE PATIENT EXPERIENCED ANY OF THE FOLLOWING WITHIN THE WEEK PRIOR TO ADMISSION?
Fusiform Excision Additional Text (Leave Blank If You Do Not Want): The margin was drawn around the clinically apparent lesion.  A fusiform shape was then drawn on the skin incorporating the lesion and margins.  Incisions were then made along these lines to the appropriate tissue plane and the lesion was extirpated. no

## 2023-06-08 NOTE — ED PROVIDER NOTE - GASTROINTESTINAL [+], MLM
The patient has been examined and the H&P has been reviewed:    I concur with the findings and no changes have occurred since H&P was written.    Surgery risks, benefits and alternative options discussed and understood by patient/family.          There are no hospital problems to display for this patient.     loss of appetite/NAUSEA/VOMITING

## 2023-11-12 NOTE — H&P ADULT - NSICDXPASTMEDICALHX_GEN_ALL_CORE_FT
Date of Service: 11/02/2023    HISTORY:    The patient presents for skin examination.  He has a history of multiple basal cell carcinomas and history of atypical nevi and solar keratoses. He is concerned about a persistent pink papule on the left lateral eyelid area present for 6 months.  He noted a raised area of skin on the left nasofacial sulcus a few months ago.  It has since improved and gone away.  He points to keratotic lesions on the left axilla and right lateral lower trunk.  He has a history of seborrheic dermatitis and has had only partial control using the Nizoral 2% shampoo two to three times per week.  Scalp tends to be quite itchy. He is not aware of any other concerning skin lesions or skin problems.    PHYSICAL EXAMINATION -- DETAILED SKIN EXAM CONTINUED:    On the left lateral eyelid, upper cheek junction, documented with digital photograph, 2 cm below the left lateral canthus and 8.5 cm from the left ear tragus is a 1.2 x 0.8 cm pink pearly papule with overlying telangiectasias.    Pink white scaly, rough solar keratoses of 6-8 mm in size are seen on the left ear mid helix, midline frontal scalp, and right forearm.    There are raised light brown keratotic stuck-on benign-appearing seborrheic keratoses of the right lateral lower trunk as well as the left axilla where the patient points.  They are both benign.    Background erythema overlying greasy yellow white scale noted on most portions of the scalp and around the ears.  No other concerning skin lesions or skin problems noted.    IMPRESSION:    1.  Possible basal cell carcinoma of the left lateral lower eyelid at the junction with the upper cheek.  2.  Solar keratoses of the left mid helix, midline frontal scalp, and right forearm.  3.  Seborrheic keratoses of the right lateral lower trunk and left axilla.  4.  Seborrheic dermatitis, moderately active with significant pruritus.  5.  History of multiple skin cancers.    TREATMENT PLAN:    1.   Discussed management options.  2.  Tangential biopsy of a portion of the lesion of the left lower eyelid.  1% Lidocaine with Epinephrine. Tangential biopsy obtained to the level of the upper dermis.  Good hemostasis with electrodessication of the base.  Defect size is 5 x 6 mm.  Vaseline and bandage applied.  Wound care instructions reviewed.  3.  Liquid Nitrogen cryotherapy was applied to the 3 solar keratoses.  4.  Increase frequency of Nizoral 2% shampoo to be used 3 times per week.  Add Lidex 0.05% solution to scalp itchy areas once or twice daily with instructions given.  5.  No treatment needed for the benign lesions noted above.  6.  Sunscreens and self skin exam.  7.  Further followup will be based on biopsy results.      Dictated By: Desmond Rod MD  Signing Provider: MD ANNALEE Blunt/emre ([id number])   DD: 11/09/2023 6:37:42 PM TD: 11/11/2023 8:54:57 PM       PAST MEDICAL HISTORY:  Diabetes     EtOH dependence

## 2024-01-23 NOTE — ED ADULT TRIAGE NOTE - DATE/TIME OF ACCEPTANCE
Health Maintenance Due   Topic Date Due    Diabetes Eye Exam  Never done    Shingles Vaccine (1 of 2) Never done    Abdominal Aortic Aneurysm (AAA) Screening  Never done    Lung Cancer Screening  02/09/2022    COVID-19 Vaccine (3 - 2023-24 season) 09/01/2023    Diabetes Foot Exam  10/27/2023       Patient is due for topics as listed above but is not proceeding with Immunization(s) COVID-19 and Shingles, Abdominal Aortic Aneurysm (AAA) screening, Diabetes Eye Exam, Diabetes Foot Exam, and Lung Cancer Screening at this time.    08-Jul-2022 11:14

## 2024-06-19 NOTE — DISCHARGE NOTE PROVIDER - NS AS DC PROVIDER CONTACT Y/N MULTI
Msg to MD - transfer of care OB pt, 18w2d, . Seen previously with [facility], was referred to Derek due to abnormal carrier screen, has US and appt with him 24. Last see with [facility] 24. She states she also already tested positive for GDM    Please advise when you would like her seen with you, ok for ?    Yes

## 2025-01-03 NOTE — H&P ADULT - NSHPLANGTRANSLATORFT_GEN_A_CORE
Last seen for DM in Sept with plan to follow up 1 month. Please schedule, labs prior. Interim supply. Ordered.   
Interpretation service - [id number] [place]

## 2025-03-06 NOTE — PATIENT PROFILE ADULT - HOME/WORK/SCHOOL SAFETY PLAN
Recommendations:   Continue gradual increase of Neosure as tolerated -- currently providing 22 kcal/oz -- smaller, frequent feeds; 73mL q2h during the day and continuous nighttime feeds -- 200mL or 40mL/hr over 5 hours. Continue to offer po feeds as tolerated/per SLP.    *If constipation does not improve; consider trial increasing nighttime feeds gradually to 47mL/hr over 5 hrs (235mL) -- can gradually increase from 40mL/hr to 43mL/hr then 47mL/hr    Restart MCT oil, 1mL twice daily; to provide additional ~15 kcals/day            Continue daily Vit D supplementation per MD   Introduction of age appropriate solids per SLP/MD  
sometimes

## 2025-05-20 NOTE — ED PROVIDER NOTE - CPE EDP GASTRO NORM
[facility]  EMERGENCY DEPARTMENT ENCOUNTER      Pt Name: Saran Curtis  MRN: [id number]  Birthdate 1986  Date of evaluation: 5/19/2025  Provider: Zaid Rose PA-C    CHIEF COMPLAINT       Chief Complaint   Patient presents with    Rash         HISTORY OF PRESENT ILLNESS   (Location/Symptom, Timing/Onset, Context/Setting, Quality, Duration, Modifying Factors, Severity)  Note limiting factors.   HPI  39-year-old male presenting to ED with rash.  Reports rash that started 3 days ago.  At home has multiple family members who have been diagnosed with hand-foot-and-mouth disease.  Rash is on the bilateral hands, feet.  He has also noticed some lesions to his face.  Denies fevers, chills.  Reports sensation of burning where the rash is, it is itchy.  Has taken ibuprofen with minimal improvement.    Review of External Medical Records:     Nursing Notes were reviewed.    REVIEW OF SYSTEMS    (2-9 systems for level 4, 10 or more for level 5)     Review of Systems   Skin:  Positive for rash.       Except as noted above the remainder of the review of systems was reviewed and negative.       PAST MEDICAL HISTORY   No past medical history on file.      SURGICAL HISTORY     No past surgical history on file.      CURRENT MEDICATIONS       Discharge Medication List as of 5/19/2025 10:49 PM          ALLERGIES     Patient has no known allergies.    FAMILY HISTORY     No family history on file.       SOCIAL HISTORY       Social History     Socioeconomic History    Marital status:            PHYSICAL EXAM    (up to 7 for level 4, 8 or more for level 5)     ED Triage Vitals   BP Systolic BP Percentile Diastolic BP Percentile Temp Temp Source Pulse Respirations SpO2   05/19/25 2014 -- -- 05/19/25 2014 05/19/25 2014 05/19/25 2014 05/19/25 2014 05/19/25 2014   (!) 130/97   98.2 °F (36.8 °C) Oral 97 18 98 %      Height Weight - Scale         05/19/25 2014 05/19/25 2107         1.753 m (5' 9\") 127 kg 
- - -

## 2025-06-19 NOTE — ED ADULT TRIAGE NOTE - BRAND OF COVID-19 VACCINATION
Addended by: MARCUS PIMENTEL on: 6/19/2025 10:11 AM     Modules accepted: Orders    
Pfizer dose 1 and 2
